# Patient Record
Sex: FEMALE | Race: WHITE | NOT HISPANIC OR LATINO | Employment: UNEMPLOYED | URBAN - METROPOLITAN AREA
[De-identification: names, ages, dates, MRNs, and addresses within clinical notes are randomized per-mention and may not be internally consistent; named-entity substitution may affect disease eponyms.]

---

## 2017-01-09 ENCOUNTER — HOSPITAL ENCOUNTER (OUTPATIENT)
Dept: PALLIATIVE MEDICINE | Facility: OTHER | Age: 52
Discharge: HOME | End: 2017-01-09
Attending: NURSE PRACTITIONER

## 2017-01-09 ENCOUNTER — COMMUNICATION - HEALTHEAST (OUTPATIENT)
Dept: PALLIATIVE MEDICINE | Facility: OTHER | Age: 52
End: 2017-01-09

## 2017-01-09 DIAGNOSIS — G43.701 CHRONIC MIGRAINE WITHOUT AURA WITH STATUS MIGRAINOSUS, NOT INTRACTABLE: ICD-10-CM

## 2017-01-09 DIAGNOSIS — G89.4 CHRONIC PAIN SYNDROME: ICD-10-CM

## 2017-01-09 ASSESSMENT — MIFFLIN-ST. JEOR: SCORE: 1583.79

## 2017-01-19 ENCOUNTER — COMMUNICATION - HEALTHEAST (OUTPATIENT)
Dept: PALLIATIVE MEDICINE | Facility: OTHER | Age: 52
End: 2017-01-19

## 2017-01-19 DIAGNOSIS — G43.701 CHRONIC MIGRAINE WITHOUT AURA WITH STATUS MIGRAINOSUS, NOT INTRACTABLE: ICD-10-CM

## 2017-02-20 ENCOUNTER — COMMUNICATION - HEALTHEAST (OUTPATIENT)
Dept: PALLIATIVE MEDICINE | Facility: OTHER | Age: 52
End: 2017-02-20

## 2017-02-20 DIAGNOSIS — G43.701 CHRONIC MIGRAINE WITHOUT AURA WITH STATUS MIGRAINOSUS, NOT INTRACTABLE: ICD-10-CM

## 2017-03-03 ENCOUNTER — COMMUNICATION - HEALTHEAST (OUTPATIENT)
Dept: PALLIATIVE MEDICINE | Facility: OTHER | Age: 52
End: 2017-03-03

## 2017-03-09 ENCOUNTER — HOSPITAL ENCOUNTER (OUTPATIENT)
Dept: PALLIATIVE MEDICINE | Facility: OTHER | Age: 52
Discharge: HOME | End: 2017-03-09
Attending: NURSE PRACTITIONER

## 2017-03-09 DIAGNOSIS — G43.701 CHRONIC MIGRAINE WITHOUT AURA WITH STATUS MIGRAINOSUS, NOT INTRACTABLE: ICD-10-CM

## 2017-03-09 DIAGNOSIS — M54.2 NECK PAIN: ICD-10-CM

## 2017-03-09 ASSESSMENT — MIFFLIN-ST. JEOR: SCORE: 1583.79

## 2017-04-17 ENCOUNTER — COMMUNICATION - HEALTHEAST (OUTPATIENT)
Dept: PALLIATIVE MEDICINE | Facility: CLINIC | Age: 52
End: 2017-04-17

## 2017-05-04 ENCOUNTER — HOSPITAL ENCOUNTER (OUTPATIENT)
Dept: PALLIATIVE MEDICINE | Facility: OTHER | Age: 52
Discharge: HOME | End: 2017-05-04
Attending: NURSE PRACTITIONER

## 2017-05-04 DIAGNOSIS — G43.701 CHRONIC MIGRAINE WITHOUT AURA WITH STATUS MIGRAINOSUS, NOT INTRACTABLE: ICD-10-CM

## 2017-05-04 DIAGNOSIS — G43.711 INTRACTABLE CHRONIC MIGRAINE WITHOUT AURA AND WITH STATUS MIGRAINOSUS: ICD-10-CM

## 2017-05-04 ASSESSMENT — MIFFLIN-ST. JEOR: SCORE: 1583.79

## 2017-05-16 ENCOUNTER — COMMUNICATION - HEALTHEAST (OUTPATIENT)
Dept: PALLIATIVE MEDICINE | Facility: CLINIC | Age: 52
End: 2017-05-16

## 2017-05-19 ENCOUNTER — AMBULATORY - HEALTHEAST (OUTPATIENT)
Dept: PALLIATIVE MEDICINE | Facility: OTHER | Age: 52
End: 2017-05-19

## 2017-06-10 ENCOUNTER — COMMUNICATION - HEALTHEAST (OUTPATIENT)
Dept: SCHEDULING | Facility: CLINIC | Age: 52
End: 2017-06-10

## 2017-06-28 ENCOUNTER — COMMUNICATION - HEALTHEAST (OUTPATIENT)
Dept: PALLIATIVE MEDICINE | Facility: OTHER | Age: 52
End: 2017-06-28

## 2017-06-28 DIAGNOSIS — G43.711 INTRACTABLE CHRONIC MIGRAINE WITHOUT AURA AND WITH STATUS MIGRAINOSUS: ICD-10-CM

## 2017-06-29 ENCOUNTER — HOSPITAL ENCOUNTER (OUTPATIENT)
Dept: PALLIATIVE MEDICINE | Facility: OTHER | Age: 52
Discharge: HOME | End: 2017-06-29
Attending: NURSE PRACTITIONER

## 2017-06-29 DIAGNOSIS — G43.701 CHRONIC MIGRAINE WITHOUT AURA WITH STATUS MIGRAINOSUS, NOT INTRACTABLE: ICD-10-CM

## 2017-06-29 DIAGNOSIS — G89.4 CHRONIC PAIN SYNDROME: ICD-10-CM

## 2017-06-29 DIAGNOSIS — G43.711 INTRACTABLE CHRONIC MIGRAINE WITHOUT AURA AND WITH STATUS MIGRAINOSUS: ICD-10-CM

## 2017-06-29 ASSESSMENT — MIFFLIN-ST. JEOR: SCORE: 1583.79

## 2017-07-06 ENCOUNTER — COMMUNICATION - HEALTHEAST (OUTPATIENT)
Dept: PALLIATIVE MEDICINE | Facility: CLINIC | Age: 52
End: 2017-07-06

## 2017-07-08 ENCOUNTER — COMMUNICATION - HEALTHEAST (OUTPATIENT)
Dept: SCHEDULING | Facility: CLINIC | Age: 52
End: 2017-07-08

## 2017-07-11 ENCOUNTER — AMBULATORY - HEALTHEAST (OUTPATIENT)
Dept: PALLIATIVE MEDICINE | Facility: OTHER | Age: 52
End: 2017-07-11

## 2017-08-23 ENCOUNTER — COMMUNICATION - HEALTHEAST (OUTPATIENT)
Dept: PALLIATIVE MEDICINE | Facility: OTHER | Age: 52
End: 2017-08-23

## 2017-08-23 DIAGNOSIS — G43.711 INTRACTABLE CHRONIC MIGRAINE WITHOUT AURA AND WITH STATUS MIGRAINOSUS: ICD-10-CM

## 2017-08-29 ENCOUNTER — HOSPITAL ENCOUNTER (OUTPATIENT)
Dept: PALLIATIVE MEDICINE | Facility: OTHER | Age: 52
Discharge: HOME | End: 2017-08-29
Attending: NURSE PRACTITIONER

## 2017-08-29 DIAGNOSIS — G43.711 INTRACTABLE CHRONIC MIGRAINE WITHOUT AURA AND WITH STATUS MIGRAINOSUS: ICD-10-CM

## 2017-08-29 DIAGNOSIS — G43.701 CHRONIC MIGRAINE WITHOUT AURA WITH STATUS MIGRAINOSUS, NOT INTRACTABLE: ICD-10-CM

## 2017-08-29 ASSESSMENT — MIFFLIN-ST. JEOR: SCORE: 1583.79

## 2017-10-22 ENCOUNTER — COMMUNICATION - HEALTHEAST (OUTPATIENT)
Dept: PALLIATIVE MEDICINE | Facility: OTHER | Age: 52
End: 2017-10-22

## 2017-10-22 DIAGNOSIS — G43.711 INTRACTABLE CHRONIC MIGRAINE WITHOUT AURA AND WITH STATUS MIGRAINOSUS: ICD-10-CM

## 2017-10-24 ENCOUNTER — HOSPITAL ENCOUNTER (OUTPATIENT)
Dept: PALLIATIVE MEDICINE | Facility: OTHER | Age: 52
Discharge: HOME | End: 2017-10-24
Attending: NURSE PRACTITIONER

## 2017-10-24 DIAGNOSIS — G43.701 CHRONIC MIGRAINE WITHOUT AURA WITH STATUS MIGRAINOSUS, NOT INTRACTABLE: ICD-10-CM

## 2017-10-24 DIAGNOSIS — G43.711 INTRACTABLE CHRONIC MIGRAINE WITHOUT AURA AND WITH STATUS MIGRAINOSUS: ICD-10-CM

## 2017-10-24 ASSESSMENT — MIFFLIN-ST. JEOR: SCORE: 1583.79

## 2017-12-19 ENCOUNTER — COMMUNICATION - HEALTHEAST (OUTPATIENT)
Dept: PALLIATIVE MEDICINE | Facility: OTHER | Age: 52
End: 2017-12-19

## 2017-12-19 ENCOUNTER — HOSPITAL ENCOUNTER (OUTPATIENT)
Dept: PALLIATIVE MEDICINE | Facility: OTHER | Age: 52
Discharge: HOME | End: 2017-12-19
Attending: NURSE PRACTITIONER

## 2017-12-19 DIAGNOSIS — G43.701 CHRONIC MIGRAINE WITHOUT AURA WITH STATUS MIGRAINOSUS, NOT INTRACTABLE: ICD-10-CM

## 2017-12-19 DIAGNOSIS — G43.711 INTRACTABLE CHRONIC MIGRAINE WITHOUT AURA AND WITH STATUS MIGRAINOSUS: ICD-10-CM

## 2017-12-19 ASSESSMENT — MIFFLIN-ST. JEOR: SCORE: 1583.79

## 2018-01-09 ENCOUNTER — COMMUNICATION - HEALTHEAST (OUTPATIENT)
Dept: PALLIATIVE MEDICINE | Facility: OTHER | Age: 53
End: 2018-01-09

## 2018-01-09 DIAGNOSIS — G43.711 INTRACTABLE CHRONIC MIGRAINE WITHOUT AURA AND WITH STATUS MIGRAINOSUS: ICD-10-CM

## 2018-01-17 ENCOUNTER — COMMUNICATION - HEALTHEAST (OUTPATIENT)
Dept: PALLIATIVE MEDICINE | Facility: OTHER | Age: 53
End: 2018-01-17

## 2018-01-17 DIAGNOSIS — G43.711 INTRACTABLE CHRONIC MIGRAINE WITHOUT AURA AND WITH STATUS MIGRAINOSUS: ICD-10-CM

## 2018-01-22 ENCOUNTER — COMMUNICATION - HEALTHEAST (OUTPATIENT)
Dept: PALLIATIVE MEDICINE | Facility: OTHER | Age: 53
End: 2018-01-22

## 2018-01-22 DIAGNOSIS — G43.701 CHRONIC MIGRAINE WITHOUT AURA WITH STATUS MIGRAINOSUS, NOT INTRACTABLE: ICD-10-CM

## 2018-01-23 ENCOUNTER — COMMUNICATION - HEALTHEAST (OUTPATIENT)
Dept: PALLIATIVE MEDICINE | Facility: OTHER | Age: 53
End: 2018-01-23

## 2018-01-23 DIAGNOSIS — G43.701 CHRONIC MIGRAINE WITHOUT AURA WITH STATUS MIGRAINOSUS, NOT INTRACTABLE: ICD-10-CM

## 2018-02-12 ENCOUNTER — HOSPITAL ENCOUNTER (OUTPATIENT)
Dept: PALLIATIVE MEDICINE | Facility: OTHER | Age: 53
Discharge: HOME | End: 2018-02-12
Attending: NURSE PRACTITIONER

## 2018-02-12 DIAGNOSIS — G43.701 CHRONIC MIGRAINE WITHOUT AURA WITH STATUS MIGRAINOSUS, NOT INTRACTABLE: ICD-10-CM

## 2018-02-12 ASSESSMENT — MIFFLIN-ST. JEOR: SCORE: 1583.79

## 2018-04-06 ENCOUNTER — COMMUNICATION - HEALTHEAST (OUTPATIENT)
Dept: PALLIATIVE MEDICINE | Facility: OTHER | Age: 53
End: 2018-04-06

## 2018-04-06 DIAGNOSIS — G43.711 INTRACTABLE CHRONIC MIGRAINE WITHOUT AURA AND WITH STATUS MIGRAINOSUS: ICD-10-CM

## 2018-04-06 DIAGNOSIS — G43.701 CHRONIC MIGRAINE WITHOUT AURA WITH STATUS MIGRAINOSUS, NOT INTRACTABLE: ICD-10-CM

## 2018-04-09 ENCOUNTER — COMMUNICATION - HEALTHEAST (OUTPATIENT)
Dept: PALLIATIVE MEDICINE | Facility: OTHER | Age: 53
End: 2018-04-09

## 2018-04-09 ENCOUNTER — HOSPITAL ENCOUNTER (OUTPATIENT)
Dept: PALLIATIVE MEDICINE | Facility: OTHER | Age: 53
Discharge: HOME | End: 2018-04-09
Attending: NURSE PRACTITIONER

## 2018-04-09 DIAGNOSIS — G43.711 INTRACTABLE CHRONIC MIGRAINE WITHOUT AURA AND WITH STATUS MIGRAINOSUS: ICD-10-CM

## 2018-04-09 DIAGNOSIS — G43.701 CHRONIC MIGRAINE WITHOUT AURA WITH STATUS MIGRAINOSUS, NOT INTRACTABLE: ICD-10-CM

## 2018-04-09 ASSESSMENT — MIFFLIN-ST. JEOR: SCORE: 1629.15

## 2018-05-25 ENCOUNTER — COMMUNICATION - HEALTHEAST (OUTPATIENT)
Dept: PALLIATIVE MEDICINE | Facility: OTHER | Age: 53
End: 2018-05-25

## 2018-05-25 DIAGNOSIS — G43.701 CHRONIC MIGRAINE WITHOUT AURA WITH STATUS MIGRAINOSUS, NOT INTRACTABLE: ICD-10-CM

## 2018-06-05 ENCOUNTER — HOSPITAL ENCOUNTER (OUTPATIENT)
Dept: PALLIATIVE MEDICINE | Facility: OTHER | Age: 53
Discharge: HOME | End: 2018-06-05
Attending: NURSE PRACTITIONER

## 2018-06-05 DIAGNOSIS — G43.701 CHRONIC MIGRAINE WITHOUT AURA WITH STATUS MIGRAINOSUS, NOT INTRACTABLE: ICD-10-CM

## 2018-06-05 DIAGNOSIS — K21.9 GASTROESOPHAGEAL REFLUX DISEASE WITHOUT ESOPHAGITIS: ICD-10-CM

## 2018-06-05 DIAGNOSIS — G43.711 INTRACTABLE CHRONIC MIGRAINE WITHOUT AURA AND WITH STATUS MIGRAINOSUS: ICD-10-CM

## 2018-06-05 ASSESSMENT — MIFFLIN-ST. JEOR: SCORE: 1583.79

## 2018-07-17 ENCOUNTER — COMMUNICATION - HEALTHEAST (OUTPATIENT)
Dept: PALLIATIVE MEDICINE | Facility: OTHER | Age: 53
End: 2018-07-17

## 2018-07-17 DIAGNOSIS — K21.9 GASTROESOPHAGEAL REFLUX DISEASE WITHOUT ESOPHAGITIS: ICD-10-CM

## 2018-07-26 ENCOUNTER — COMMUNICATION - HEALTHEAST (OUTPATIENT)
Dept: PALLIATIVE MEDICINE | Facility: OTHER | Age: 53
End: 2018-07-26

## 2018-07-26 DIAGNOSIS — G43.711 INTRACTABLE CHRONIC MIGRAINE WITHOUT AURA AND WITH STATUS MIGRAINOSUS: ICD-10-CM

## 2018-07-31 ENCOUNTER — HOSPITAL ENCOUNTER (OUTPATIENT)
Dept: PALLIATIVE MEDICINE | Facility: OTHER | Age: 53
Discharge: HOME | End: 2018-07-31
Attending: NURSE PRACTITIONER

## 2018-07-31 DIAGNOSIS — G43.701 CHRONIC MIGRAINE WITHOUT AURA WITH STATUS MIGRAINOSUS, NOT INTRACTABLE: ICD-10-CM

## 2018-07-31 DIAGNOSIS — G43.711 INTRACTABLE CHRONIC MIGRAINE WITHOUT AURA AND WITH STATUS MIGRAINOSUS: ICD-10-CM

## 2018-07-31 ASSESSMENT — MIFFLIN-ST. JEOR: SCORE: 1606.47

## 2018-08-03 ENCOUNTER — COMMUNICATION - HEALTHEAST (OUTPATIENT)
Dept: PALLIATIVE MEDICINE | Facility: OTHER | Age: 53
End: 2018-08-03

## 2018-08-07 ENCOUNTER — COMMUNICATION - HEALTHEAST (OUTPATIENT)
Dept: PALLIATIVE MEDICINE | Facility: CLINIC | Age: 53
End: 2018-08-07

## 2018-08-09 ENCOUNTER — COMMUNICATION - HEALTHEAST (OUTPATIENT)
Dept: PALLIATIVE MEDICINE | Facility: OTHER | Age: 53
End: 2018-08-09

## 2018-08-14 ENCOUNTER — COMMUNICATION - HEALTHEAST (OUTPATIENT)
Dept: PALLIATIVE MEDICINE | Facility: OTHER | Age: 53
End: 2018-08-14

## 2018-08-14 DIAGNOSIS — G43.701 CHRONIC MIGRAINE WITHOUT AURA WITH STATUS MIGRAINOSUS, NOT INTRACTABLE: ICD-10-CM

## 2018-08-15 ENCOUNTER — COMMUNICATION - HEALTHEAST (OUTPATIENT)
Dept: PALLIATIVE MEDICINE | Facility: OTHER | Age: 53
End: 2018-08-15

## 2018-08-15 DIAGNOSIS — G43.701 CHRONIC MIGRAINE WITHOUT AURA WITH STATUS MIGRAINOSUS, NOT INTRACTABLE: ICD-10-CM

## 2018-08-22 ENCOUNTER — HOSPITAL ENCOUNTER (OUTPATIENT)
Dept: PALLIATIVE MEDICINE | Facility: OTHER | Age: 53
Discharge: HOME | End: 2018-08-22
Attending: NURSE PRACTITIONER

## 2018-08-22 DIAGNOSIS — G43.711 INTRACTABLE CHRONIC MIGRAINE WITHOUT AURA AND WITH STATUS MIGRAINOSUS: ICD-10-CM

## 2018-08-22 DIAGNOSIS — G89.4 CHRONIC PAIN SYNDROME: ICD-10-CM

## 2018-08-22 ASSESSMENT — MIFFLIN-ST. JEOR: SCORE: 1606.47

## 2018-09-04 ENCOUNTER — HOSPITAL ENCOUNTER (OUTPATIENT)
Dept: RADIOLOGY | Facility: CLINIC | Age: 53
Discharge: HOME | End: 2018-09-04

## 2018-09-04 DIAGNOSIS — G89.4 CHRONIC PAIN SYNDROME: ICD-10-CM

## 2018-09-04 DIAGNOSIS — G43.711 INTRACTABLE CHRONIC MIGRAINE WITHOUT AURA AND WITH STATUS MIGRAINOSUS: ICD-10-CM

## 2018-09-25 ENCOUNTER — HOSPITAL ENCOUNTER (OUTPATIENT)
Dept: PALLIATIVE MEDICINE | Facility: OTHER | Age: 53
Discharge: HOME | End: 2018-09-25
Attending: NURSE PRACTITIONER

## 2018-09-25 DIAGNOSIS — G43.711 INTRACTABLE CHRONIC MIGRAINE WITHOUT AURA AND WITH STATUS MIGRAINOSUS: ICD-10-CM

## 2018-09-25 DIAGNOSIS — G43.701 CHRONIC MIGRAINE WITHOUT AURA WITH STATUS MIGRAINOSUS, NOT INTRACTABLE: ICD-10-CM

## 2018-09-25 ASSESSMENT — MIFFLIN-ST. JEOR: SCORE: 1583.79

## 2018-11-21 ENCOUNTER — HOSPITAL ENCOUNTER (OUTPATIENT)
Dept: PALLIATIVE MEDICINE | Facility: OTHER | Age: 53
Discharge: HOME | End: 2018-11-21
Attending: NURSE PRACTITIONER

## 2018-11-21 DIAGNOSIS — G89.4 CHRONIC PAIN SYNDROME: ICD-10-CM

## 2018-11-21 ASSESSMENT — MIFFLIN-ST. JEOR: SCORE: 1583.79

## 2018-11-23 ENCOUNTER — HOSPITAL ENCOUNTER (OUTPATIENT)
Dept: PALLIATIVE MEDICINE | Facility: OTHER | Age: 53
Discharge: HOME | End: 2018-11-23
Attending: NURSE PRACTITIONER

## 2018-11-23 DIAGNOSIS — G43.701 CHRONIC MIGRAINE WITHOUT AURA WITH STATUS MIGRAINOSUS, NOT INTRACTABLE: ICD-10-CM

## 2018-11-23 ASSESSMENT — MIFFLIN-ST. JEOR: SCORE: 1583.79

## 2018-12-18 ENCOUNTER — COMMUNICATION - HEALTHEAST (OUTPATIENT)
Dept: PALLIATIVE MEDICINE | Facility: OTHER | Age: 53
End: 2018-12-18

## 2019-01-02 ENCOUNTER — COMMUNICATION - HEALTHEAST (OUTPATIENT)
Dept: PALLIATIVE MEDICINE | Facility: OTHER | Age: 54
End: 2019-01-02

## 2019-01-02 DIAGNOSIS — G43.711 INTRACTABLE CHRONIC MIGRAINE WITHOUT AURA AND WITH STATUS MIGRAINOSUS: ICD-10-CM

## 2019-01-17 ENCOUNTER — COMMUNICATION - HEALTHEAST (OUTPATIENT)
Dept: PALLIATIVE MEDICINE | Facility: OTHER | Age: 54
End: 2019-01-17

## 2019-01-17 DIAGNOSIS — G43.711 INTRACTABLE CHRONIC MIGRAINE WITHOUT AURA AND WITH STATUS MIGRAINOSUS: ICD-10-CM

## 2019-01-22 ENCOUNTER — HOSPITAL ENCOUNTER (OUTPATIENT)
Dept: PALLIATIVE MEDICINE | Facility: OTHER | Age: 54
Discharge: HOME | End: 2019-01-22
Attending: NURSE PRACTITIONER

## 2019-01-22 DIAGNOSIS — G43.711 INTRACTABLE CHRONIC MIGRAINE WITHOUT AURA AND WITH STATUS MIGRAINOSUS: ICD-10-CM

## 2019-01-22 DIAGNOSIS — G43.701 CHRONIC MIGRAINE WITHOUT AURA WITH STATUS MIGRAINOSUS, NOT INTRACTABLE: ICD-10-CM

## 2019-01-22 DIAGNOSIS — G89.4 CHRONIC PAIN SYNDROME: ICD-10-CM

## 2019-01-22 ASSESSMENT — MIFFLIN-ST. JEOR: SCORE: 1583.79

## 2019-01-25 LAB

## 2019-02-23 ENCOUNTER — COMMUNICATION - HEALTHEAST (OUTPATIENT)
Dept: PALLIATIVE MEDICINE | Facility: OTHER | Age: 54
End: 2019-02-23

## 2019-02-23 DIAGNOSIS — G43.711 INTRACTABLE CHRONIC MIGRAINE WITHOUT AURA AND WITH STATUS MIGRAINOSUS: ICD-10-CM

## 2019-03-22 ENCOUNTER — HOSPITAL ENCOUNTER (OUTPATIENT)
Dept: PALLIATIVE MEDICINE | Facility: OTHER | Age: 54
Discharge: HOME | End: 2019-03-22
Attending: NURSE PRACTITIONER

## 2019-03-22 DIAGNOSIS — G43.701 CHRONIC MIGRAINE WITHOUT AURA WITH STATUS MIGRAINOSUS, NOT INTRACTABLE: ICD-10-CM

## 2019-03-22 DIAGNOSIS — G43.711 INTRACTABLE CHRONIC MIGRAINE WITHOUT AURA AND WITH STATUS MIGRAINOSUS: ICD-10-CM

## 2019-03-22 ASSESSMENT — MIFFLIN-ST. JEOR: SCORE: 1538.43

## 2019-05-23 ENCOUNTER — COMMUNICATION - HEALTHEAST (OUTPATIENT)
Dept: PALLIATIVE MEDICINE | Facility: OTHER | Age: 54
End: 2019-05-23

## 2019-05-23 ENCOUNTER — HOSPITAL ENCOUNTER (OUTPATIENT)
Dept: PALLIATIVE MEDICINE | Facility: OTHER | Age: 54
Discharge: HOME | End: 2019-05-23
Attending: NURSE PRACTITIONER

## 2019-05-23 DIAGNOSIS — G43.701 CHRONIC MIGRAINE WITHOUT AURA WITH STATUS MIGRAINOSUS, NOT INTRACTABLE: ICD-10-CM

## 2019-05-23 DIAGNOSIS — G43.711 INTRACTABLE CHRONIC MIGRAINE WITHOUT AURA AND WITH STATUS MIGRAINOSUS: ICD-10-CM

## 2019-05-23 ASSESSMENT — MIFFLIN-ST. JEOR: SCORE: 1538.43

## 2019-06-06 ENCOUNTER — HOSPITAL ENCOUNTER (OUTPATIENT)
Dept: PALLIATIVE MEDICINE | Facility: OTHER | Age: 54
Discharge: HOME | End: 2019-06-06
Attending: NURSE PRACTITIONER

## 2019-06-06 DIAGNOSIS — M54.9 INTRACTABLE BACK PAIN: ICD-10-CM

## 2019-06-06 DIAGNOSIS — G89.29 CHRONIC NONINTRACTABLE HEADACHE, UNSPECIFIED HEADACHE TYPE: ICD-10-CM

## 2019-06-06 DIAGNOSIS — G89.4 CHRONIC PAIN SYNDROME: ICD-10-CM

## 2019-06-06 DIAGNOSIS — R51.9 CHRONIC NONINTRACTABLE HEADACHE, UNSPECIFIED HEADACHE TYPE: ICD-10-CM

## 2019-06-06 ASSESSMENT — MIFFLIN-ST. JEOR: SCORE: 1538.43

## 2019-07-12 ENCOUNTER — COMMUNICATION - HEALTHEAST (OUTPATIENT)
Dept: PALLIATIVE MEDICINE | Facility: OTHER | Age: 54
End: 2019-07-12

## 2019-07-12 DIAGNOSIS — G43.711 INTRACTABLE CHRONIC MIGRAINE WITHOUT AURA AND WITH STATUS MIGRAINOSUS: ICD-10-CM

## 2019-07-16 ENCOUNTER — HOSPITAL ENCOUNTER (OUTPATIENT)
Dept: PALLIATIVE MEDICINE | Facility: OTHER | Age: 54
Discharge: HOME | End: 2019-07-16
Attending: NURSE PRACTITIONER

## 2019-07-16 DIAGNOSIS — G43.711 INTRACTABLE CHRONIC MIGRAINE WITHOUT AURA AND WITH STATUS MIGRAINOSUS: ICD-10-CM

## 2019-07-16 DIAGNOSIS — G43.701 CHRONIC MIGRAINE WITHOUT AURA WITH STATUS MIGRAINOSUS, NOT INTRACTABLE: ICD-10-CM

## 2019-07-16 ASSESSMENT — MIFFLIN-ST. JEOR: SCORE: 1515.75

## 2019-09-10 ENCOUNTER — HOSPITAL ENCOUNTER (OUTPATIENT)
Dept: PALLIATIVE MEDICINE | Facility: OTHER | Age: 54
Discharge: HOME | End: 2019-09-10
Attending: NURSE PRACTITIONER

## 2019-09-10 DIAGNOSIS — R11.0 NAUSEA: ICD-10-CM

## 2019-09-10 DIAGNOSIS — G43.719 INTRACTABLE CHRONIC MIGRAINE WITHOUT AURA AND WITHOUT STATUS MIGRAINOSUS: ICD-10-CM

## 2019-09-10 DIAGNOSIS — G89.4 CHRONIC PAIN SYNDROME: ICD-10-CM

## 2019-09-10 DIAGNOSIS — G43.701 CHRONIC MIGRAINE WITHOUT AURA WITH STATUS MIGRAINOSUS, NOT INTRACTABLE: ICD-10-CM

## 2019-09-10 ASSESSMENT — MIFFLIN-ST. JEOR: SCORE: 1515.75

## 2019-10-19 ENCOUNTER — COMMUNICATION - HEALTHEAST (OUTPATIENT)
Dept: PALLIATIVE MEDICINE | Facility: OTHER | Age: 54
End: 2019-10-19

## 2019-10-19 DIAGNOSIS — G43.711 INTRACTABLE CHRONIC MIGRAINE WITHOUT AURA AND WITH STATUS MIGRAINOSUS: ICD-10-CM

## 2019-10-20 ENCOUNTER — COMMUNICATION - HEALTHEAST (OUTPATIENT)
Dept: PALLIATIVE MEDICINE | Facility: OTHER | Age: 54
End: 2019-10-20

## 2019-10-20 DIAGNOSIS — G43.711 INTRACTABLE CHRONIC MIGRAINE WITHOUT AURA AND WITH STATUS MIGRAINOSUS: ICD-10-CM

## 2019-11-05 ENCOUNTER — HOSPITAL ENCOUNTER (OUTPATIENT)
Dept: PALLIATIVE MEDICINE | Facility: OTHER | Age: 54
Discharge: HOME | End: 2019-11-05
Attending: NURSE PRACTITIONER

## 2019-11-05 DIAGNOSIS — G43.701 CHRONIC MIGRAINE WITHOUT AURA WITH STATUS MIGRAINOSUS, NOT INTRACTABLE: ICD-10-CM

## 2019-11-05 DIAGNOSIS — G43.711 INTRACTABLE CHRONIC MIGRAINE WITHOUT AURA AND WITH STATUS MIGRAINOSUS: ICD-10-CM

## 2019-11-05 DIAGNOSIS — G43.719 INTRACTABLE CHRONIC MIGRAINE WITHOUT AURA AND WITHOUT STATUS MIGRAINOSUS: ICD-10-CM

## 2019-11-05 DIAGNOSIS — R11.0 NAUSEA: ICD-10-CM

## 2019-11-05 RX ORDER — ATORVASTATIN CALCIUM 20 MG/1
20 TABLET, FILM COATED ORAL
Status: SHIPPED | COMMUNITY
Start: 2019-10-30

## 2019-11-05 RX ORDER — KETOROLAC TROMETHAMINE 10 MG/1
10 TABLET, FILM COATED ORAL 2 TIMES DAILY PRN
Qty: 10 TABLET | Refills: 2 | Status: SHIPPED | OUTPATIENT
Start: 2019-11-05 | End: 2021-09-14

## 2019-11-05 ASSESSMENT — MIFFLIN-ST. JEOR: SCORE: 1515.75

## 2019-12-05 ENCOUNTER — HOSPITAL ENCOUNTER (OUTPATIENT)
Dept: PALLIATIVE MEDICINE | Facility: OTHER | Age: 54
Discharge: HOME | End: 2019-12-05
Attending: NURSE PRACTITIONER

## 2019-12-05 DIAGNOSIS — G89.4 CHRONIC PAIN SYNDROME: ICD-10-CM

## 2019-12-05 ASSESSMENT — MIFFLIN-ST. JEOR: SCORE: 1515.75

## 2019-12-31 ENCOUNTER — HOSPITAL ENCOUNTER (OUTPATIENT)
Dept: PALLIATIVE MEDICINE | Facility: OTHER | Age: 54
Discharge: HOME | End: 2019-12-31
Attending: NURSE PRACTITIONER

## 2019-12-31 DIAGNOSIS — G43.711 INTRACTABLE CHRONIC MIGRAINE WITHOUT AURA AND WITH STATUS MIGRAINOSUS: ICD-10-CM

## 2019-12-31 DIAGNOSIS — R11.0 NAUSEA: ICD-10-CM

## 2019-12-31 DIAGNOSIS — G43.719 INTRACTABLE CHRONIC MIGRAINE WITHOUT AURA AND WITHOUT STATUS MIGRAINOSUS: ICD-10-CM

## 2019-12-31 DIAGNOSIS — G43.701 CHRONIC MIGRAINE WITHOUT AURA WITH STATUS MIGRAINOSUS, NOT INTRACTABLE: ICD-10-CM

## 2019-12-31 ASSESSMENT — MIFFLIN-ST. JEOR: SCORE: 1515.75

## 2020-02-21 ENCOUNTER — HOSPITAL ENCOUNTER (OUTPATIENT)
Dept: PALLIATIVE MEDICINE | Facility: OTHER | Age: 55
Discharge: HOME | End: 2020-02-21
Attending: NURSE PRACTITIONER

## 2020-02-21 DIAGNOSIS — G43.719 INTRACTABLE CHRONIC MIGRAINE WITHOUT AURA AND WITHOUT STATUS MIGRAINOSUS: ICD-10-CM

## 2020-02-21 DIAGNOSIS — G89.4 CHRONIC PAIN SYNDROME: ICD-10-CM

## 2020-02-21 DIAGNOSIS — R11.0 NAUSEA: ICD-10-CM

## 2020-02-21 DIAGNOSIS — G43.711 INTRACTABLE CHRONIC MIGRAINE WITHOUT AURA AND WITH STATUS MIGRAINOSUS: ICD-10-CM

## 2020-02-21 DIAGNOSIS — G43.701 CHRONIC MIGRAINE WITHOUT AURA WITH STATUS MIGRAINOSUS, NOT INTRACTABLE: ICD-10-CM

## 2020-02-21 ASSESSMENT — MIFFLIN-ST. JEOR: SCORE: 1547.51

## 2020-02-24 LAB

## 2020-03-16 ENCOUNTER — COMMUNICATION - HEALTHEAST (OUTPATIENT)
Dept: PALLIATIVE MEDICINE | Facility: OTHER | Age: 55
End: 2020-03-16

## 2020-03-16 DIAGNOSIS — G43.711 INTRACTABLE CHRONIC MIGRAINE WITHOUT AURA AND WITH STATUS MIGRAINOSUS: ICD-10-CM

## 2020-04-14 RX ORDER — HYDROCHLOROTHIAZIDE 12.5 MG/1
12.5 TABLET ORAL DAILY
Status: SHIPPED | COMMUNITY
Start: 2020-02-25

## 2020-04-14 RX ORDER — PANTOPRAZOLE SODIUM 20 MG/1
20 TABLET, DELAYED RELEASE ORAL DAILY
Status: SHIPPED | COMMUNITY
Start: 2020-03-16

## 2020-04-14 RX ORDER — LOSARTAN POTASSIUM 50 MG/1
100 TABLET ORAL DAILY
Status: SHIPPED | COMMUNITY
Start: 2020-02-26

## 2020-04-15 ENCOUNTER — HOSPITAL ENCOUNTER (OUTPATIENT)
Dept: PALLIATIVE MEDICINE | Facility: OTHER | Age: 55
Discharge: HOME | End: 2020-04-15
Attending: NURSE PRACTITIONER

## 2020-04-15 DIAGNOSIS — G43.711 INTRACTABLE CHRONIC MIGRAINE WITHOUT AURA AND WITH STATUS MIGRAINOSUS: ICD-10-CM

## 2020-04-15 DIAGNOSIS — R11.0 NAUSEA: ICD-10-CM

## 2020-04-15 DIAGNOSIS — G43.719 INTRACTABLE CHRONIC MIGRAINE WITHOUT AURA AND WITHOUT STATUS MIGRAINOSUS: ICD-10-CM

## 2020-04-15 DIAGNOSIS — G43.701 CHRONIC MIGRAINE WITHOUT AURA WITH STATUS MIGRAINOSUS, NOT INTRACTABLE: ICD-10-CM

## 2020-06-11 ENCOUNTER — HOSPITAL ENCOUNTER (OUTPATIENT)
Dept: PALLIATIVE MEDICINE | Facility: OTHER | Age: 55
Discharge: HOME | End: 2020-06-11
Attending: NURSE PRACTITIONER

## 2020-06-11 DIAGNOSIS — G43.701 CHRONIC MIGRAINE WITHOUT AURA WITH STATUS MIGRAINOSUS, NOT INTRACTABLE: ICD-10-CM

## 2020-08-04 ENCOUNTER — COMMUNICATION - HEALTHEAST (OUTPATIENT)
Dept: PALLIATIVE MEDICINE | Facility: OTHER | Age: 55
End: 2020-08-04

## 2020-08-04 DIAGNOSIS — G43.701 CHRONIC MIGRAINE WITHOUT AURA WITH STATUS MIGRAINOSUS, NOT INTRACTABLE: ICD-10-CM

## 2020-08-04 DIAGNOSIS — G43.711 INTRACTABLE CHRONIC MIGRAINE WITHOUT AURA AND WITH STATUS MIGRAINOSUS: ICD-10-CM

## 2020-08-11 ENCOUNTER — COMMUNICATION - HEALTHEAST (OUTPATIENT)
Dept: PALLIATIVE MEDICINE | Facility: OTHER | Age: 55
End: 2020-08-11

## 2020-08-11 DIAGNOSIS — G43.711 INTRACTABLE CHRONIC MIGRAINE WITHOUT AURA AND WITH STATUS MIGRAINOSUS: ICD-10-CM

## 2020-08-28 ENCOUNTER — HOSPITAL ENCOUNTER (OUTPATIENT)
Dept: PALLIATIVE MEDICINE | Facility: OTHER | Age: 55
Discharge: HOME | End: 2020-08-28
Attending: NURSE PRACTITIONER

## 2020-08-28 DIAGNOSIS — G43.719 INTRACTABLE CHRONIC MIGRAINE WITHOUT AURA AND WITHOUT STATUS MIGRAINOSUS: ICD-10-CM

## 2020-08-28 DIAGNOSIS — R11.0 NAUSEA: ICD-10-CM

## 2020-08-28 DIAGNOSIS — G43.701 CHRONIC MIGRAINE WITHOUT AURA WITH STATUS MIGRAINOSUS, NOT INTRACTABLE: ICD-10-CM

## 2020-08-28 DIAGNOSIS — G43.711 INTRACTABLE CHRONIC MIGRAINE WITHOUT AURA AND WITH STATUS MIGRAINOSUS: ICD-10-CM

## 2020-11-19 ENCOUNTER — HOSPITAL ENCOUNTER (OUTPATIENT)
Dept: PALLIATIVE MEDICINE | Facility: OTHER | Age: 55
Discharge: HOME | End: 2020-11-19
Attending: NURSE PRACTITIONER

## 2020-11-19 DIAGNOSIS — G43.711 INTRACTABLE CHRONIC MIGRAINE WITHOUT AURA AND WITH STATUS MIGRAINOSUS: ICD-10-CM

## 2020-11-19 DIAGNOSIS — K21.9 GASTROESOPHAGEAL REFLUX DISEASE WITHOUT ESOPHAGITIS: ICD-10-CM

## 2020-11-19 DIAGNOSIS — G43.701 CHRONIC MIGRAINE WITHOUT AURA WITH STATUS MIGRAINOSUS, NOT INTRACTABLE: ICD-10-CM

## 2020-11-19 DIAGNOSIS — R11.0 NAUSEA: ICD-10-CM

## 2020-11-19 DIAGNOSIS — G43.719 INTRACTABLE CHRONIC MIGRAINE WITHOUT AURA AND WITHOUT STATUS MIGRAINOSUS: ICD-10-CM

## 2021-01-07 ENCOUNTER — COMMUNICATION - HEALTHEAST (OUTPATIENT)
Dept: PALLIATIVE MEDICINE | Facility: OTHER | Age: 56
End: 2021-01-07

## 2021-01-20 ENCOUNTER — COMMUNICATION - HEALTHEAST (OUTPATIENT)
Dept: PALLIATIVE MEDICINE | Facility: OTHER | Age: 56
End: 2021-01-20

## 2021-01-20 DIAGNOSIS — G43.701 CHRONIC MIGRAINE WITHOUT AURA WITH STATUS MIGRAINOSUS, NOT INTRACTABLE: ICD-10-CM

## 2021-01-26 ENCOUNTER — HOSPITAL ENCOUNTER (OUTPATIENT)
Dept: PALLIATIVE MEDICINE | Facility: OTHER | Age: 56
Discharge: HOME | End: 2021-01-26
Attending: NURSE PRACTITIONER

## 2021-01-26 DIAGNOSIS — R11.0 NAUSEA: ICD-10-CM

## 2021-01-26 DIAGNOSIS — G89.4 CHRONIC PAIN SYNDROME: ICD-10-CM

## 2021-01-26 DIAGNOSIS — G43.701 CHRONIC MIGRAINE WITHOUT AURA WITH STATUS MIGRAINOSUS, NOT INTRACTABLE: ICD-10-CM

## 2021-01-26 DIAGNOSIS — G43.719 INTRACTABLE CHRONIC MIGRAINE WITHOUT AURA AND WITHOUT STATUS MIGRAINOSUS: ICD-10-CM

## 2021-01-26 DIAGNOSIS — G43.711 INTRACTABLE CHRONIC MIGRAINE WITHOUT AURA AND WITH STATUS MIGRAINOSUS: ICD-10-CM

## 2021-01-26 ASSESSMENT — MIFFLIN-ST. JEOR: SCORE: 1515.75

## 2021-01-30 LAB

## 2021-03-17 ENCOUNTER — HOSPITAL ENCOUNTER (OUTPATIENT)
Dept: PALLIATIVE MEDICINE | Facility: OTHER | Age: 56
Discharge: HOME | End: 2021-03-17
Attending: NURSE PRACTITIONER

## 2021-03-17 DIAGNOSIS — R11.0 NAUSEA: ICD-10-CM

## 2021-03-17 DIAGNOSIS — G43.711 INTRACTABLE CHRONIC MIGRAINE WITHOUT AURA AND WITH STATUS MIGRAINOSUS: ICD-10-CM

## 2021-03-17 DIAGNOSIS — G43.719 INTRACTABLE CHRONIC MIGRAINE WITHOUT AURA AND WITHOUT STATUS MIGRAINOSUS: ICD-10-CM

## 2021-03-17 DIAGNOSIS — G89.4 CHRONIC PAIN SYNDROME: ICD-10-CM

## 2021-03-17 RX ORDER — VERAPAMIL HYDROCHLORIDE 80 MG/1
80 TABLET ORAL 2 TIMES DAILY
Qty: 180 TABLET | Refills: 0 | Status: SHIPPED | OUTPATIENT
Start: 2021-05-10 | End: 2021-09-14

## 2021-03-17 RX ORDER — PROCHLORPERAZINE MALEATE 10 MG
10 TABLET ORAL EVERY 6 HOURS PRN
Qty: 30 TABLET | Refills: 0 | Status: SHIPPED | OUTPATIENT
Start: 2021-04-26 | End: 2021-09-14

## 2021-03-17 RX ORDER — TIZANIDINE HYDROCHLORIDE 4 MG/1
12 CAPSULE, GELATIN COATED ORAL
Qty: 270 CAPSULE | Refills: 0 | Status: SHIPPED | OUTPATIENT
Start: 2021-05-03 | End: 2021-08-31

## 2021-05-26 ENCOUNTER — COMMUNICATION - HEALTHEAST (OUTPATIENT)
Dept: PALLIATIVE MEDICINE | Facility: OTHER | Age: 56
End: 2021-05-26

## 2021-05-26 DIAGNOSIS — G43.701 CHRONIC MIGRAINE WITHOUT AURA WITH STATUS MIGRAINOSUS, NOT INTRACTABLE: ICD-10-CM

## 2021-05-26 RX ORDER — HYDROCODONE BITARTRATE AND ACETAMINOPHEN 10; 325 MG/1; MG/1
1 TABLET ORAL EVERY 4 HOURS PRN
Qty: 40 TABLET | Refills: 0 | Status: SHIPPED | OUTPATIENT
Start: 2021-05-26 | End: 2021-07-08

## 2021-05-26 NOTE — PROGRESS NOTES
Subjective:   Josy Valdez is a 54 y.o. female who presents for evaluation of pain. Reviewed the rooming evaluation. Patient was last seen 1/22/19 reviewed record.     CC: Pain. Review of current status.     Major issues:  1. Intractable chronic migraine without aura and with status migrainosus    2. Chronic migraine without aura with status migrainosus, not intractable      Patient Active Problem List   Diagnosis     Migraine     Myalgia     Plantar fasciitis, bilateral     Hip pain, chronic, left     Lumbago       HPI: Questionnaires and records reviewed with the patient today.    Location/Laterality of the pain: neck pain, and right shoulder  Severity: Today: 3   Since last visit pain scores: at best 2. at worst 8. on average 4  Quality: starts at the neck and spreads to center o the head  Timing: constant  Aggravating factors: stress and lack of sleep  Alleviating factors: sleep, medication, medical cannabis  Any New pain, injuries, falls: no  Since last visit pain has: improved  Associated symptoms:    Numbness: -   Weakness: -   Bladder or Bowel loss of control: -   Night pain: +   Fever and/or Chills: -   Unexplained weight loss: -    Headache:  Last week she started to get HA again. Unclear why. She indicates she did have a period when she was not having HA.   Frequency of HA: daily    Duration of HA: all day, constant   Quality of HA: pulsing, throbbing    Location of HA: suboccipital w/ migraine then moves to the center of her head    Severity of HA: 3/10  Aura description: peripheral twinkling; black dots with the migraine, dizziness (this has been better)  #of HA days per mo: daily baseline HA  Rehabilitation: She has completed Craniosacral therapy and we will monitor for any changes over time. Hx of PT. Self massage  Interventional: TPI; Botox injection (did not offer impact and is not considered to be a option)  Prophylactic treatment: verapmil    Abortive Medication for HA: not a candidate r/t the CVA     Treated: Norco and Toradol (assistive), Dark room, sleep, medical cannabis  Associated Mainfestiation of the migraine: Photophobia, watery eyes  Not tried acupuncture afraid of needles, not interested in chiropractic     Functional Symptoms: Pain interferes with:  Sleep: + no changes with the medical cannabis. She has a unique cycle. She is generally functional.   Walking:    Ambulation/Transfer: Pt is ambulatory. Transfers independently.  ADL's: independent   Bathing    Cooking    Dressing    Housekeeping    Toileting    Shopping   Transportation: Driving  Relationships/Social: She is engaged with family      Activities Impaired by Increasing Pain Severity: F= 6  3-Enjoy  4-Work, Enjoy  5-Active, Mood Work Enjoy  6-Sleep, Active, Mood Work Enjoy  7-Walk, Sleep, Active, Mood Work Enjoy  8-Relate, Walk, Sleep, Active, Mood Work Enjoy    Impact of pain treatments:   Patient reports function has improved with current pain treatment: yes    Mood related to pain: Prozac was increased and she is feeling better   Depressed: -   Angry: -   Frustrated: -   Anxious: +   Helpless/Hopeless: -    Pertinent Medical Hx/Safety:   Blood thinners: -   Pregnant or wanting to become pregnant: -   New diagnostics since last visit: -   ED/UC visit since last visit: -   New treatment or New medical condition: -    Pain Plan of Care Review:   Medication:   Last opioid dose was Hydrocodone last dose was a fewdays ago per patient. She is using the medical cannabis generally daily    Medication changes: no  Medication side/adverse effects: no  Aberrant behavior: no      Current Outpatient Medications:      aspirin 81 mg chewable tablet, Chew 81 mg daily., Disp: , Rfl:      azelastine (ASTEPRO) 0.15 % (205.5 mcg) Spry nasal spray, Apply 205.5 mcg into each nostril 2 (two) times a day., Disp: , Rfl:      CHOLECALCIFEROL, VITAMIN D3, (VITAMIN D3 ORAL), Take by mouth., Disp: , Rfl:      DOCOSAHEXANOIC ACID/EPA (FISH OIL ORAL), Take by mouth.,  Disp: , Rfl:      FLUoxetine (PROZAC) 20 MG capsule, TAKE 1 CAPSULE BY MOUTH TWICE A DAY, Disp: 180 capsule, Rfl: 0 - primary care took over management     GARLIC ORAL, Take by mouth., Disp: , Rfl:      hydroCHLOROthiazide (HYDRODIURIL) 12.5 MG tablet, Take 12.5 mg by mouth daily., Disp: , Rfl:      HYDROcodone-acetaminophen (NORCO )  mg per tablet, Take 1 tablet by mouth every 4 (four) hours as needed for pain (max of 4/day and 140/28days)., Disp: 112 tablet, Rfl: 0 - she indicates she is has about 20 pills remaining.      ketorolac (TORADOL) 10 mg tablet, Take 1 tablet (10 mg total) by mouth 2 (two) times a day as needed for pain (max of 10/28 days)., Disp: 10 tablet, Rfl: 0 - does not need a refill at this time     L.ACID/L.CASEI/B.BIF/B.ERIC/FOS (PROBIOTIC BLEND ORAL), Take by mouth., Disp: , Rfl:      losartan-hydrochlorothiazide (HYZAAR) 50-12.5 mg per tablet, Take 1 tablet by mouth., Disp: , Rfl:      melatonin 3 mg TbER, Take 6 mg by mouth., Disp: , Rfl:      multivitamin capsule, Take 1 capsule by mouth daily., Disp: , Rfl:      omeprazole (PRILOSEC) 20 MG capsule, TAKE 1 CAPSULE BY MOUTH EVERY DAY, Disp: 30 capsule, Rfl: 1 - primary care has taken over     polyethylene glycol (GLYCOLAX) 17 gram/dose powder, , Disp: , Rfl:      TiZANidine (ZANAFLEX) 4 MG capsule, Take 3 capsules (12 mg total) by mouth at bedtime as needed for muscle spasms., Disp: 270 capsule, Rfl: 0 - continued     UNABLE TO FIND, Medical Cannabis, Disp: , Rfl:  - continued  No reportable RAMO's caused by medical cannabis. Specific RAMO: unexpected or harmful physical or psychological reaction following the use of medical cannabis that results in any of the following:  (1) death  (2) admission to a hospital  (3) medical treatment beyond basic first aid or mental health care  In the event of a RAMO:  [employer] [phone]  [employer] [phone]  Office of Medical Cannabis at [phone] ([facility]) or  "[phone] (non-[facility]).    Following initiation of Josy SONG Valdez use of medical cannabis there has been positive benefit.    Changes in medication since initiating medical cannabis include reduction in overall opioid load. Has reported her  noticed improvement in being sociable. IBS improved with the medical cannabis       verapamil (CALAN) 80 MG tablet, Take 1 tablet (80 mg total) by mouth 2 (two) times a day., Disp: 180 tablet, Rfl: 0 - continued    Rehabilitation: discussed PT not interested at this time  Home exercise program: She has been walking on her treadmil.    Review of Systems   Constitutional- denies sleep disturbances, + activity intolerance  Musculoskeletal- + pain  Neuro- - cognitive changes  HEENT: + HA  GI: IBS improved   Psych-  + mood concerns (stable),  - taking medication in a fashion other than prescribed    Social  No family history on file.  Social History     Tobacco Use   Smoking Status Former Smoker   Smokeless Tobacco Never Used     Social History     Substance and Sexual Activity   Alcohol Use No    Comment: in recovery     Social History     Substance and Sexual Activity   Drug Use No     Social History     Substance and Sexual Activity   Sexual Activity Not on file       Objective:     Vitals:    03/22/19 1101   BP: 113/72   Pulse: 94   Resp: 16   Weight: 210 lb (95.3 kg)   Height: 5' 5\" (1.651 m)   PainSc:   3       Constitutional:  Pleasant and cooperative female who presents alone today.   Psychiatric: Mood and affect are appropriate for the situation, setting and topic of discussion.  Patient does not appear sedated.  Integumentary:  Observed skin WNL.   HEENT: EOM's grossly intact.    Chest: Breathing is non-labored.   Neurological:  Alert and oriented in all spheres including: time, place, person and situation.    Diagnostics:   Lab:  Notes recorded by Lola Talavera CNP on 1/25/2019 at 4:31 PM CST  Reviewed note 1/22/19 Last opioid dose was Hydrocodone ast dose- " 01/22/2019 @ 700am. Noted pt is on the medical cannabis program  UDT:  Detected Marijuana metabolite (expected); Detected hydrocodone and metabolite (expected)     :  Dated 3/22/2019 reviewed to aid with decision regarding medication management      Assessment:   Josy Valdez is a 54 y.o. female seen in clinic today for migraine HA. Due to CVA she is not a candidate for triptans. Failed Botox. She has completed craniosacral therapy. She is doing a HEP from PT.   She started the medical cannabis program this has been going well.      We are working down and off the opioid medication. She is doing well with the medical cannabis. We have discussed she may need opioids intermittently and she may need them when she travels as the medical cannabis is not able to be transported across state lines.    *Universal Precautions:    UDT/Swab- 01/22/2019  Consent-11/23/18  Agreement- 11/23/8  Pharmacy- as documented    Count- #4 remaining hydrocodone at appt 7/17/15   Psychological evaluation: DA 9/18/15  MME- 40  1/22/19 MME=10  03/21/2019 MME- 10  Pharmacogenetic testing- n/a  MTM: n/a.    Management of opioid medication is inherently a moderate to high complex medial interaction based on the risk management required at each contact r/t risks and side effects.    Plan:   Plan of Care / NextSteps:     Follow up the following date: 2 months      Education:   Please call Monday-Friday for problems or questions and one of the clinical support staff (CSS) will help to get things figured out. The number is [phone].     [employer] is a means to send an e-mail ([employer] message) to communicate any concerns.     Please remember some issues require an office visit.     Today we reviewed the plan of care and answered questions.      Records: Reviewed to assist with preparation for the office visit and are reflected throughout the note.    Primary Care: You need to have an annual physical and check in with your primary care folks on  a regular basis. Talk with your primary care about the frequency of expected visits.     Rehabilitation: Continue with your home exercises    Diagnostics:   Notes recorded by Lola Talavera CNP on 1/25/2019 at 4:31 PM CST  Reviewed note 1/22/19 Last opioid dose was Hydrocodone ast dose- 01/22/2019 @ 700am. Noted pt is on the medical cannabis program  UDT:  Detected Marijuana metabolite (expected); Detected hydrocodone and metabolite (expected)     Medication prescribed / to be continued: norco (will call for a refill if needed in the next 2 months), toradol (call if you need a refill), tizanidine, verapamil, medial cannabis (Crystal)    Medication prescribed today:    Requested Prescriptions     Signed Prescriptions Disp Refills     TiZANidine (ZANAFLEX) 4 MG capsule 4/22-7/21 270 capsule 0     Sig: Take 3 capsules (12 mg total) by mouth at bedtime as needed for muscle spasms.     verapamil (CALAN) 80 MG tablet 4/22-7/21 180 tablet 0     Sig: Take 1 tablet (80 mg total) by mouth 2 (two) times a day.         REFILL INSTRUCTIONS: Please be mindful to chose a single means of communication about medication refills as multiple means of communication for the same prescription request  (your pharmacy, mychart and telephone calls) leads to confusion and delays    Note: Due to the increase in paperwork we are no longer leaving voice mail messages when refills have been sent to the pharmacy    Please contact the clinic 3-7 days before your refill is due. Speak clearly; note cell phones cut in-and-out and poor quality speech and reception issues will influence our ability to hear you and be efficient with your prescription.     Call [phone] leave:   Your name (first and last w/ spelling)   Date of birth  Name of all the medication(s) being requested  Dose of the medication(s)   How you are taking the medication (eg. twice per day etc).     Contact your pharmacy and talk with your pharmacist about any government  Controlled/Scheduled medications 3 (three) days after leaving your message to see if your prescription has been received. Please request the pharmacist check your profile to be certain about any concerns with a script failing to be received.   If the script has not been received there may have been a problem with the communication please reach back out to the clinic.        Patient Arrived @ 1035 for a 1120 appointment.     TT: [phone]    Lola Talavera APRN FNP-BC  [address] [facility] [address]   [phone]  [phone]

## 2021-05-26 NOTE — PATIENT INSTRUCTIONS - HE
Plan:   Plan of Care / NextSteps:     Follow up the following date: 2 months      Education:   Please call Monday-Friday for problems or questions and one of the clinical support staff (CSS) will help to get things figured out. The number is [phone].     [employer] is a means to send an e-mail ([employer] message) to communicate any concerns.     Please remember some issues require an office visit.     Today we reviewed the plan of care and answered questions.      Records: Reviewed to assist with preparation for the office visit and are reflected throughout the note.    Primary Care: You need to have an annual physical and check in with your primary care folks on a regular basis. Talk with your primary care about the frequency of expected visits.     Rehabilitation: Continue with your home exercises    Diagnostics:   Notes recorded by Lola Talavera CNP on 1/25/2019 at 4:31 PM CST  Reviewed note 1/22/19 Last opioid dose was Hydrocodone ast dose- 01/22/2019 @ 700am. Noted pt is on the medical cannabis program  UDT:  Detected Marijuana metabolite (expected); Detected hydrocodone and metabolite (expected)     Medication prescribed / to be continued: norco (will call for a refill if needed in the next 2 months), toradol (call if you need a refill), tizanidine, verapamil, medial cannabis (Crystal)    Medication prescribed today:    Requested Prescriptions     Signed Prescriptions Disp Refills     TiZANidine (ZANAFLEX) 4 MG capsule 4/22-7/21 270 capsule 0     Sig: Take 3 capsules (12 mg total) by mouth at bedtime as needed for muscle spasms.     verapamil (CALAN) 80 MG tablet 4/22-7/21 180 tablet 0     Sig: Take 1 tablet (80 mg total) by mouth 2 (two) times a day.         REFILL INSTRUCTIONS: Please be mindful to chose a single means of communication about medication refills as multiple means of communication for the same prescription request  (your pharmacy, mychart and telephone calls) leads to confusion and  delays    Note: Due to the increase in paperwork we are no longer leaving voice mail messages when refills have been sent to the pharmacy    Please contact the clinic 3-7 days before your refill is due. Speak clearly; note cell phones cut in-and-out and poor quality speech and reception issues will influence our ability to hear you and be efficient with your prescription.     Call [phone] leave:   Your name (first and last w/ spelling)   Date of birth  Name of all the medication(s) being requested  Dose of the medication(s)   How you are taking the medication (eg. twice per day etc).     Contact your pharmacy and talk with your pharmacist about any government Controlled/Scheduled medications 3 (three) days after leaving your message to see if your prescription has been received. Please request the pharmacist check your profile to be certain about any concerns with a script failing to be received.   If the script has not been received there may have been a problem with the communication please reach back out to the clinic.

## 2021-05-26 NOTE — PROGRESS NOTES
Assessment: Dated 3/22/2019 ANJELICA Score: 16    Pain Intensity:  The pain is very mild at the moment    Personal Care:  I can look after myself normally but it causes extra pain.    Lifting:  I can lift heavy weights but it give extra pain    Walking:  Pain prevents me from walking more than 1 mile    Sitting:   I can sit in any chair as long as I like    Standing  I can stand as long as I want but it gives me extra pain    Sleeping  My sleep occasionally disturbed by pain    Sex Life:  My sex life is normal and causes no extra pain    Social life:  My social life is normal but increases the degree of pain    Traveling:  I can travel anywhere but it gives me extra pain

## 2021-05-29 NOTE — PROGRESS NOTES
Patient presents to the clinic today for a follow up with Tricia Bro CNP regarding head pain. Patient describes their pain as 3-constant, dull. Her/His function score is 3.

## 2021-05-29 NOTE — PATIENT INSTRUCTIONS - HE
Plan:   Interventions-    Follow up in 6-12 months or PRN for re-certification. Call with any concerns

## 2021-05-29 NOTE — TELEPHONE ENCOUNTER
Central PA team  [phone]  Pool: HE PA MED ([id number])          PA has been initiated.       PA form completed and faxed insurance via Cover My Meds     Key: [id number]    Medication: HYDROcodone-Acetaminophen 10-325MG tablets  Insurance: [employer]        Response will be received via fax and may take up to 5-10 business days depending on plan

## 2021-05-29 NOTE — PATIENT INSTRUCTIONS - HE
Plan:   Plan of Care / NextSteps:     Follow up by: 7/18/19      Education:   Please call Monday-Friday for problems or questions and one of the clinical support staff (CSS) will help to get things figured out. The number is [phone].     [employer] is a means to send an e-mail ([employer] message) to communicate any concerns.     Please remember some issues require an office visit.     Today we reviewed the plan of care and answered questions.      Records: Reviewed to assist with preparation for the office visit and are reflected throughout the note.    Primary Care: You need to have an annual physical and check in with your primary care folks on a regular basis. Talk with your primary care about the frequency of expected visits.     Rehabilitation: remain active       Medication prescribed / to be continued: norco, toradol (call if you need a refill), tizanidine (call if you need a refill - you may have a script at your pharmacy), verapamil (call if you need a refill you may have a script at your pharmacy), medial cannabis (Crystal)  Medication prescribed today:    Requested Prescriptions     Signed Prescriptions Disp Refills     HYDROcodone-acetaminophen (NORCO )  mg per tablet 5/23-6/20 112 tablet 0     Sig: Take 1 tablet by mouth every 4 (four) hours as needed for pain (max of 4/day and 140/28days).     HYDROcodone-acetaminophen (NORCO )  mg per tablet 6/20-7/18 112 tablet 0     Sig: Take 1 tablet by mouth every 4 (four) hours as needed for pain (max of 4/day and 140/28days).         REFILL INSTRUCTIONS: Please be mindful to chose a single means of communication about medication refills as multiple means of communication for the same prescription request  (your pharmacy, mychart and telephone calls) leads to confusion and delays    Note: Due to the increase in paperwork we are no longer leaving voice mail messages when refills have been sent to the pharmacy    Please contact the clinic  "3-7 days before your refill is due. Speak clearly; note cell phones cut in-and-out and poor quality speech and reception issues will influence our ability to hear you and be efficient with your prescription.     Call [phone] leave:   Your name (first and last w/ spelling)   Date of birth  Name of all the medication(s) being requested  Dose of the medication(s)   How you are taking the medication (eg. twice per day etc).     Contact your pharmacy and talk with your pharmacist about any government Controlled/Scheduled medications 3 (three) days after leaving your message to see if your prescription has been received. Please request the pharmacist check your profile to be certain about any concerns with a script failing to be received.   If the script has not been received there may have been a problem with the communication please reach back out to the clinic.      SAFETY REMINDER  We need to be able to reach you. Please be certain we have a working telephone number. If we are unable to reach you we may not be able to continue to prescribe opioid medication.        FAQ  Q. Why is alcohol consumption a concern with pain medication?  A. Opioids decrease you drive to breathe. Alcohol enhances this effect.  Using together or within a week of each other is unsafe. If we can see it in the urine it is having an effect on your body.     Q. I live with chronic pain and take my medication like it is prescribed why am I at risk for an overdose?  A. Opioids decrease your drive to breathe. Generally a little decrease in drive to breathe is manageable. Illness like bladder infection, pneumonia, COPD, sleep apnea may decrease your ability to get oxygen. This can lead to an overdose.    If you are running a fever medication may be absorbed differently.     Nausea and vomiting have led to folks \"losing\" medication - additional dosing because we do not know how much may have been absorbed can lead to an overdose.     As we age " general health changes occure and this can lead to increased issues with clearing medication from the liver or kidneys increasing the risk of an overdose.    Q. I have been using benzodiazepines for years with my opioids what happened that they should not be used together any more?  A. Combinations of medication can put a person at high risk for overdose. In one study it was noted that 80% of overdoses occurred in people who were taking a combination of opioids and benzodiazepines.    Q. I hear about Naloxone and I understand is a medicine that can be used if there is a concern about an overdose, should I have it available at home?  A. Anyone with an MME over 50 or who uses combinations of medication that enhance the effects of an opioid is a good candidate for Naloxone. If you do not have Naloxone ask me we can talk about it together.    Q. Why should I have a safe?  A. You assume the responsibility of harm or death another person should someone else use your medication. A big concern would be if someone else got your medication. Your medication is not prescribed to anyone other than you. Do not sell, loan, borrow or share your medication with anyone. It is illegal.     Q. What does an overdose look like?  A. Symptoms of overdose include:   !breathing slow and shallow, erratic or not at all  !pinpoint pupils, hallucinations  !confusion  !muscle jerks, slack muscles   !extreme sleepiness or loss of alertness   !awake but not able to talk   !face pale or clammy, vomiting, for lighter skinned people, the skin tone turns bluish purple, for darker skinned people, it turns grayish or ashen   If in a situation where overdose is a concern engage the emergency response system (dial 911).

## 2021-05-29 NOTE — PROGRESS NOTES
Patient is here for a follow up with Jessica Talavera CNP for her headaches        *Universal Precautions:    UDT/Swab- 01/22/2019  Consent-11/23/18  Agreement- 11/23/8  Pharmacy- as documented    Count- #4 remaining hydrocodone at appt 7/17/15   Psychological evaluation: DA 9/18/15  MME- 40  1/22/19 MME=10  03/21/2019 MME- 10   05/23/2019 MME- 10  Pharmacogenetic testing- n/a  MTM: n/a.

## 2021-05-29 NOTE — TELEPHONE ENCOUNTER
Prior Authorization Request  Who s requesting:  Pharmacy/Talavera  Pharm[facility] Name and Location: [facility]  Medication Name: hydrocodone 10/325mg, 4/day  Insurance Plan: [id number]  Insurance Member ID Number:  [id number]  Informed patient that prior authorizations can take up to 10 business days for response:   Yes   72 hrs  Okay to leave a detailed message: Yes

## 2021-05-29 NOTE — PROGRESS NOTES
PAIN CLINIC FOLLOW UP PROGRESS NOTE    CC:  Chief Complaint   Patient presents with     Follow-up     renew medical cannabis, head pain       HPI  Josy Valdez is a 54 y.o. female who presents for evaluation   Chief Complaint   Patient presents with     Follow-up     renew medical cannabis, head pain    that is causing continued pain. Since the last visit the patient denies any trips to the urgent care or ED specifically for their pain. The patient denies any new medications, diagnoses since the last visit. Specific questions indicated the patient wanted addressed today include: Patient presents to re-certify for medical cannabis     Major issues:  1. Chronic pain syndrome    2. Intractable back pain    3. Chronic nonintractable headache, unspecified headache type        Today the pain is located in their head and neck  and is described as constant, and is rated at a 3 on a scale of 1-10. Patient notes that the combination of the medical cannabis and the norco have provided her with great relief.     Associated symptoms: Denies any loss of bladder control, fevers/chills, unintentional weight loss, weakness, numbness or pain that interferes with sleep.   Aggravating factors include: increased stress and lack of sleep   Alleviating factors: Trout medical cannabis.   Adverse effects of medications: NONE  Functional symptoms: affects her ability to sleep and makes her anxious.   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     Physical Exam  Vitals:    06/06/19 0949   BP: 128/68   Patient Site: Left Arm   Patient Position: Sitting   Cuff Size: Adult Large   Pulse: 84   Weight: 210 lb (95.3 kg)   Height: 5' 5\" (1.651 m)       General- patient is alert and oriented, in NAD, well-groomed, well-nourished  Psych- Judgment and insight normal, AOx4, recent and remote memory normal, mood and affect normal  Eyes- pupils are equal and reactive, conjunctiva is clear bilaterally, no ptosis is noted.   Respiratory- breathing is non-labored  Cardiovascular- extremities warm and well perfused, no peripheral edema or varicosities.  Musculoskeletal- gait is normal, extremities with no joint swelling, erythema, or warmth.  Neuro- normal strength, no gait abnormalities, normal sensation to pain, temperature, light touch. Occipital headaches that radiate up from her cervical spine.   Integumentary- no rashes, dermatitis or discolorations noted throughout, no open wounds noted.         Medications    Current Outpatient Medications:      [START ON 6/20/2019] HYDROcodone-acetaminophen (NORCO )  mg per tablet, Take 1 tablet by mouth every 4 (four) hours as needed for pain (max of 4/day and 140/28days)., Disp: 112 tablet, Rfl: 0     UNABLE TO FIND, Medical Cannabis, Disp: , Rfl:      aspirin 81 mg chewable tablet, Chew 81 mg daily., Disp: , Rfl:      azelastine (ASTEPRO) 0.15 % (205.5 mcg) Spry nasal spray, Apply 205.5 mcg into each nostril 2 (two) times a day., Disp: , Rfl:      CHOLECALCIFEROL, VITAMIN D3, (VITAMIN D3 ORAL), Take by mouth., Disp: , Rfl:      DOCOSAHEXANOIC ACID/EPA (FISH OIL ORAL), Take by mouth., Disp: , Rfl:      fluoxetine HCl (PROZAC ORAL), Take 60 mg by mouth., Disp: , Rfl:      GARLIC ORAL, Take by mouth., Disp: , Rfl:      HYDROcodone-acetaminophen (NORCO )  mg per tablet, Take 1 " tablet by mouth every 4 (four) hours as needed for pain (max of 4/day and 140/28days)., Disp: 112 tablet, Rfl: 0     ketorolac (TORADOL) 10 mg tablet, Take 1 tablet (10 mg total) by mouth 2 (two) times a day as needed for pain (max of 10/28 days)., Disp: 10 tablet, Rfl: 0     L.ACID/L.CASEI/B.BIF/B.ERIC/FOS (PROBIOTIC BLEND ORAL), Take by mouth., Disp: , Rfl:      losartan-hydrochlorothiazide (HYZAAR) 50-12.5 mg per tablet, Take 1 tablet by mouth., Disp: , Rfl:      melatonin 3 mg TbER, Take 6 mg by mouth., Disp: , Rfl:      multivitamin capsule, Take 1 capsule by mouth daily., Disp: , Rfl:      omeprazole (PRILOSEC) 20 MG capsule, TAKE 1 CAPSULE BY MOUTH EVERY DAY, Disp: 30 capsule, Rfl: 1     polyethylene glycol (GLYCOLAX) 17 gram/dose powder, , Disp: , Rfl:      TiZANidine (ZANAFLEX) 4 MG capsule, Take 3 capsules (12 mg total) by mouth at bedtime as needed for muscle spasms., Disp: 270 capsule, Rfl: 0     verapamil (CALAN) 80 MG tablet, Take 1 tablet (80 mg total) by mouth 2 (two) times a day., Disp: 180 tablet, Rfl: 0    Lab:  Last UDS on 1/2019 had expected results. Any abnormal results have been discussed with the patient and may change the plan of care. Please see the scanned document from the outside lab under the media tab. This was reviewed with the patient.      Imaging:  No new imaging.      Recent   Dated 6/6/2019 was reviewed with the patient today.   Paper reviewed     Assessment:   Josy Valdez is a 54 y.o. female seen in clinic today for   Chief Complaint   Patient presents with     Follow-up     renew medical cannabis, head pain     Today the patient is able to address her medical cannabis use by answering the questions below. At this time is reports that she feels that her pain control is good and wants to remain on the medical cannabis in conjunction with her opioids to maintain her current function. Renewed today for medical cannabis. Pain is consistently a 3/10.    1. Over the past 6 months  has this patient s use of medical cannabis assisted in reducing dosage or eliminating other medications used for pain? It has reduced opioids by 80%- norco     2. On a scale from 1 (no benefit) to 7 (great deal of benefit), how much benefit has the patient experienced from taking medical cannabis? 5     3. What benefit(s) has the patient experienced as a result of taking medical cannabis? Please list benefits in order of importance (starting with most important benefit) to the patient.  Reduced pain, more activity, IBS has improved and less intense headaches.     4. How much negative impact, if any, do you believe the patient has experienced by taking medical cannabis? (Please exclude cost from your consideration, as patients will receive a separate survey which specifically asks about cost)   (1- no negative effects; 7- a great deal of negative effects) 2     5. What negative effect(s) has the patient experienced as a result of taking medical cannabis? Please list negative effects in order of importance (starting with most important negative effect) to the patient. drowsiness    6. Has the patient experienced any of the following negative effects:  a. Physical side effects related to medical cannabis use (stomach upset, fatigue, headache, blurred vision, etc.)? None     b. Mental/cognitive side effects related to medical cannabis use (mental clouding, confusion, depression, etc.)? none    c. Worsening of symptoms related to the condition being treated? none    d. Difficulty/inconvenience in accessing medical cannabis? Wish there was more dispensaries    e. Other negative effect(s)?- none    7. Do you have additional clinical observations regarding medical cannabis use in this patient?  none     8. Do you have any suggestions to improve the program based on your experience, or any requests for additional information about the program? No fee would be nice    Plan:   Interventions-    Follow up in 6-12 months or PRN  for re-certification. Call with any concerns     Re-certifcation completed today for intractable axial neck pain.  Marijuana is legal in the state [facility] for chronic, intractable pain.Current diagnosis include certain cancer diagonosis, Glaucoma,HIV/AIDS, Tourette Syndrome, Amyotrophic Lateral Sclerosis (ALS). Seizures, including those characteristic of Epilepsy, Multiple Sclerosis, Inflammatory bowel disease, including Crohn s disease. Terminal illness, intractable pain, Post-Traumatic Stress Disorder    The patient Josy Valdez qualify. The registration date is for 1 year after the registration is completed from the state. Until that time, our Pain Center has a no tolerance policy for THC use. Patient is interested in discussing medical marijuana.  The interest in medical marijuana stems from their current diagnosis of   1. Chronic pain syndrome    2. Intractable back pain    3. Chronic nonintractable headache, unspecified headache type     and ongoing pain. Patient was educated about the difference between medical and recreational marijuana.     Patient was educated that currently marijuana remains a Schedule I drug according to the Drug Enforcement Agency ([place]) a branch of the [place] Department of Justice. The [place] license that allows the prescribing of opioid medication does not allow for prescribing of a Schedule I drug.  Schedule I drugs, substances, or chemicals are defined as drugs with no currently accepted medical use and a high potential for abuse. Schedule I drugs are the most dangerous drugs of all the drug schedules with potentially severe psychological or physical dependence. Some examples of Schedule I drugs are: heroin, lysergic acid diethylamide (LSD), marijuana (cannabis), 3,4-methylenedioxymethamphetamine (ecstasy), methamphetamine.  If any Schedule I drugs are found in a random Urine Drug Screen while using medical marijuana this will be reported to the dispensary and re-certification will be  denied.    Patient was educated this treatment will not be covered by insurance. The Pain Center will not advocate for financial coverage of this drug.  This is not a treatment that will be managed through the Pain Center and follow-up with dispensaries would be expected and monitored.  Your pain provider expects to have communication with the dispensary as needed.  If you are certified by another provider, we expect that you notify our Pain Center and bring appropriate documentation.  We expect patients to choose the formulation at the dispensary that is lowest in THC and highest in CBD.      We would expect the dose of the opioids to reduce by at least half if the patient is using medical marijuana.  Pain control, daily function, and reduction in oral medications would be assessed regularly.    Patient Warnings  Important information and warnings about using medical cannabis:  *Use of medical cannabis products is experimental  *Common side effects include dizziness, fatigue, dry mouth, lightheadedness, drowsiness, nausea  *Tell all your health care professionals about the medical cannabis you are using  *The following groups are at increased risk of harm from use - those under 18, women who are pregnant or breast-feeding, persons with a personal or family history of psychotic disorder  *Risks for use by persons with serious heart or liver disease  *Do not drive, operate machinery, or do work that could harm people under the influence of medical cannabis  *Keep medication secure and in their original containers  *Do not use medical cannabis where it is illegal  *Do not give or sell to others medical cannabis that you purchase  *Risk of dependence and addiction    Online information at [place].gov[place]medical[place]annabis      UDT/SWAB:  Patient required a random Urine Drug Testing, due to the need to comply with [place] Model Policy Guidelines and CDC Guideline for the use of any controlled substances. This is to ensure that  patient is compliant with treatment, and monitor for risks such as diversion, abuse, or any other aberrant behaviors. Patient is either being considered for or taking a controlled substance. Unexpected findings will be discussed and treatment decision may be adjusted. Testing is being implemented across the board randomly w/o bias related to age, race, gender, socioeconomic status or [place] affiliation.    The patient understand todays plan and has their questions answered in regards to expectations and current treatment plan.      SAFETY REMINDERS  No alcohol while taking controlled substances. Alcohol is not an illegal substance, it is unsafe to use in combination. It is a build up of substances in the body that can be extremely hazardous and may cause respirations to slow to a dangerous rate resulting in hospitalization, brain damage, or death.    Opioid medications have been associated with sharp rise in unintentional overdose and death.  Overdose is a condition characterized by the consumption in excess of a particular drug causing adverse effects. This can happen b/c you are sick, accidentally or intentionally took an extra dose, are on multiple medication that can interact. Someone took your medication and they are not use to the medication.  Symptoms of overdose include:   !breathing slow and shallow, erratic or not at all  !pinpoint pupils, hallucinations  !confusion  !muscle jerks, slack muscles   !extreme sleepiness or loss of alertness   !awake but not able to talk   !face pale or clammy, vomiting, for lighter skinned people, the skin tone turns bluish purple, for darker skinned people, it turns grayish or ashen   If in a situation where overdose is a concern engage the emergency response system (dial 911).    In one study it was noted that 80% of unintentional overdoses occurred in people who were taking a combination of opioids and benzodiazepines.    Do not sell, loan, borrow or share your opioid  medication with anyone. Deaths have occurred as a result of this practice. It is illegal and patients are being prosecuted.     Prevent unexpected access/loss of medication: Keep medication locked. Only carry what you need with you.    Tricia Bro, [facility]  [address]  [place] [address]  Ph: [phone]  Fax: [phone]

## 2021-05-30 VITALS — WEIGHT: 220 LBS | HEIGHT: 65 IN | BODY MASS INDEX: 36.65 KG/M2

## 2021-05-30 VITALS — BODY MASS INDEX: 36.65 KG/M2 | WEIGHT: 220 LBS | HEIGHT: 65 IN

## 2021-05-30 VITALS — BODY MASS INDEX: 36.65 KG/M2 | HEIGHT: 65 IN | WEIGHT: 220 LBS

## 2021-05-30 NOTE — TELEPHONE ENCOUNTER
Medication being requested: tizanidine 4mg #270  Last visit date: 5/23/19 with Jessica  Next visit date: 7/16/19 with Jessica  Expected follow up: follow up by 7/18/19  MTM visit (Pain Center) date: na  Pertinent between visit information about requested medication (telephone, mychart, prior authorization, concerns, comments): na  Script being sent to provider by nurse- dates and quantity:   Requested Prescriptions     Pending Prescriptions Disp Refills     TiZANidine (ZANAFLEX) 4 MG capsule [Pharmacy Med Name: TIZANIDINE HCL 4 MG CAPSULE] 270 capsule 0     Sig: Take 3 capsules (12 mg total) by mouth at bedtime as needed for muscle spasms.     [facility] c[facility] [place]  Standing orders for withdrawal protocol implemented: na

## 2021-05-30 NOTE — PATIENT INSTRUCTIONS - HE
Plan:   Plan of Care / NextSteps:     Follow up by: 8 weeks      Education:   Please call Monday-Friday for problems or questions and one of the clinical support staff (CSS) will help to get things figured out. The number is [phone].     YEVVO is a means to send an e-mail ([employer] [place] message) to communicate any concerns.     Please remember some issues require an office visit.     Today we reviewed the plan of care and answered questions.      Records: Reviewed to assist with preparation for the office visit and are reflected throughout the note.    Primary Care: You need to have an annual physical and check in with your primary care folks on a regular basis. Talk with your primary care about the frequency of expected visits.     Rehabilitation: Continue exercising    Medication prescribed / to be continued: tizanidine, hydrocodone, toradol, verapamil, medical cannabis    Due to changes in [place] laws, effective 2019, prescriptions for any OPIOID pain relievers must be filled within 30 days of your last written prescription. If you do not fill within 30 days, the prescription will  and you will need a brand new prescription.     In order to provide you with continued access to your prescriptions, we will be switching your prescriptions to a 28 day supply or less. It is your responsibility to get your prescriptions filled before the 30 day expiration date. Failure to do so may cause delays in getting your medications.     Some pharmacies are requesting additional information therefore it is also recommended you have available at the pharmacy a copy of the last visit After Visit Summary and your opioid agreement.       Medication prescribed today: We discussed you will anders for a refill of hydrocodone we will move to 32-40 per month    Requested Prescriptions     Signed Prescriptions Disp Refills     ketorolac (TORADOL) 10 mg tablet 10 tablet 0     Sig: Take 1 tablet (10 mg total) by mouth 2  (two) times a day as needed for pain (max of 10/28 days).     verapamil (CALAN) 80 MG tablet 180 tablet 0     Sig: Take 1 tablet (80 mg total) by mouth 2 (two) times a day.         REFILL INSTRUCTIONS: Please be mindful to chose a single means of communication about medication refills as multiple means of communication for the same prescription request  (your pharmacy, mychart and telephone calls) leads to confusion and delays    Note: Due to the increase in paperwork we are no longer leaving voice mail messages when refills have been sent to the pharmacy    Please contact the clinic 3-7 days before your refill is due. Speak clearly; note cell phones cut in-and-out and poor quality speech and reception issues will influence our ability to hear you and be efficient with your prescription.     Call [phone] leave:   Your name (first and last w/ spelling)   Date of birth  Name of all the medication(s) being requested  Dose of the medication(s)   How you are taking the medication (eg. twice per day etc).     Contact your pharmacy and talk with your pharmacist about any government Controlled/Scheduled medications 3 (three) days after leaving your message to see if your prescription has been received. Please request the pharmacist check your profile to be certain about any concerns with a script failing to be received.   If the script has not been received there may have been a problem with the communication please reach back out to the clinic.

## 2021-05-30 NOTE — PROGRESS NOTES
Assessment: Dated 7/16/2019 ANJELICA Score: 16    Pain Intensity:  The pain is very mild at the moment    Personal Care:  I can look after myself normally but it causes extra pain.    Lifting:  Pain prevents me from lifting heavy weights off the floor, but I can manage if they are conveniently placed eg. on a table    Walking:  Pain does not prevent me walking any distance    Sitting:   I can only sit in my favorite chair as long as I like    Standing  I can stand as long as I want but it gives me extra pain    Sleeping  My sleep occasionally disturbed by pain    Sex Life:  My sex life is normal and causes no extra pain    Social life:  My social life is normal but increases the degree of pain    Traveling:  I can travel anywhere but it gives me extra pain

## 2021-05-30 NOTE — PROGRESS NOTES
Patient is here for a follow up with Jessica Talavera CNP for her headaches              *Universal Precautions:    UDT/Swab- 01/22/2019  Consent-11/23/18  Agreement- 11/23/8  Pharmacy- as documented    Count- #4 remaining hydrocodone at appt 7/17/15   Psychological evaluation: DA 9/18/15  MME- 40  1/22/19 MME=10  03/21/2019 MME- 10   05/23/2019 MME- 10  07/16/19 MME- 10  Pharmacogenetic testing- n/a  MTM: n/a.

## 2021-05-30 NOTE — PROGRESS NOTES
Subjective:   Josy Valdez is a 54 y.o. female who presents for evaluation of pain. Reviewed the rooming evaluation. Patient was last seen 5/23/19 reviewed record.     CC: Pain. Review of current status.     Major issues:  1. Chronic migraine without aura with status migrainosus, not intractable    2. Intractable chronic migraine without aura and with status migrainosus      Patient Active Problem List   Diagnosis     Migraine     Myalgia     Plantar fasciitis, bilateral     Hip pain, chronic, left     Lumbago       HPI: Questionnaires and records reviewed with the patient today.    Location/Laterality of the pain: neck pain, and right shoulder  Severity: Today: 3  Quality: pain in the back of the head moves toward the center  Timing: constant  Aggravating factors: lack of sleep, stress  Alleviating factors: medication, pacing activities  Any New pain, injuries, falls: no  Since last visit pain has: improved  Associated symptoms:    Numbness: -   Weakness: -   Bladder or Bowel loss of control: -   Night pain: +   Fever and/or Chills: -   Unexplained weight loss: -    Headache:   Frequency of HA: daily    Duration of HA: all day, constant   Quality of HA: pulsing, throbbing    Location of HA: suboccipital w/ migraine then moves to the center of her head    Severity of HA: b/t 3-4/10  Aura description: peripheral twinkling; black dots with the migraine, dizziness (has not bother her for some time)  #of HA days per mo: daily baseline HA  Rehabilitation: She has completed Craniosacral therapy and we will monitor for any changes over time. Hx of PT. Self massage  Interventional: TPI; Botox injection (did not offer impact and is not considered to be a option)  Prophylactic treatment: verapmil , medical cannabis  Abortive Medication for HA: not a candidate r/t the CVA, medical cannabis  Treated: Norco and Toradol (assistive), Dark room, sleep, medical cannabis  Associated Mainfestiation of the migraine: Photophobia, watery  eyes  Not tried acupuncture afraid of needles, not interested in chiropractic     Functional Symptoms: Pain interferes with:  Sleep: She has been sleeping sporadically no changes with the medical cannabis.This is a life long sleep pattern. She generally feeling rested and able to participate.   Walking:   Ambulation/Transfer: Pt is ambulatory. Transfers independently.  ADL's:    Bathing independent   Cooking independent. She has been a little less hungry   Dressing independent   Housekeeping She will pause and pace as needed   Toileting independen   Shopping independent  Transportation: Driving  Relationships/Social: Patient is engaged with family and friends in a meaningful fashion.     Activities Impaired by Increasing Pain Severity: F= 6  3-Enjoy  4-Work, Enjoy  5-Active, Mood Work Enjoy  6-Sleep, Active, Mood Work Enjoy  7-Walk, Sleep, Active, Mood Work Enjoy  8-Relate, Walk, Sleep, Active, Mood Work Enjoy    Impact of pain treatments:   Patient reports function has improved with current pain treatment: yes    Mood related to pain:   Depressed: -   Angry: -   Frustrated: -   Anxious: +   Helpless/Hopeless: -    Pertinent Medical Hx/Safety:   Blood thinners: -   Pregnant or wanting to become pregnant: -   New diagnostics since last visit: -   ED/UC visit since last visit: -   New treatment or New medical condition: -    Pain Plan of Care Review:   Medication:   Last opioid dose was Hydrocodone last dose- 07/15/2019 @ 300pm    Medication changes: she has reduced her opioid medication to 8 per week.  Medication side/adverse effects: no  Aberrant behavior: no      Current Outpatient Medications:      aspirin 81 mg chewable tablet, Chew 81 mg daily., Disp: , Rfl:      azelastine (ASTEPRO) 0.15 % (205.5 mcg) Spry nasal spray, Apply 205.5 mcg into each nostril 2 (two) times a day., Disp: , Rfl:      CHOLECALCIFEROL, VITAMIN D3, (VITAMIN D3 ORAL), Take by mouth., Disp: , Rfl:      DOCOSAHEXANOIC ACID/EPA (FISH OIL  ORAL), Take by mouth., Disp: , Rfl:      fluoxetine HCl (PROZAC ORAL), Take 60 mg by mouth., Disp: , Rfl:      GARLIC ORAL, Take by mouth., Disp: , Rfl:      HYDROcodone-acetaminophen (NORCO )  mg per tablet, Take 1 tablet by mouth every 4 (four) hours as needed for pain (max of 4/day and 140/28days)., Disp: 112 tablet, Rfl: 0      HYDROcodone-acetaminophen (NORCO )  mg per tablet, Take 1 tablet by mouth every 4 (four) hours as needed for pain (max of 4/day and 140/28days)., Disp: 112 tablet, Rfl: 0     ketorolac (TORADOL) 10 mg tablet, Take 1 tablet (10 mg total) by mouth 2 (two) times a day as needed for pain (max of 10/28 days)., Disp: 10 tablet, Rfl: 0     L.ACID/L.CASEI/B.BIF/B.ERIC/FOS (PROBIOTIC BLEND ORAL), Take by mouth., Disp: , Rfl:      losartan-hydrochlorothiazide (HYZAAR) 50-12.5 mg per tablet, Take 1 tablet by mouth., Disp: , Rfl:      melatonin 3 mg TbER, Take 6 mg by mouth., Disp: , Rfl:      multivitamin capsule, Take 1 capsule by mouth daily., Disp: , Rfl:      omeprazole (PRILOSEC) 20 MG capsule, TAKE 1 CAPSULE BY MOUTH EVERY DAY, Disp: 30 capsule, Rfl: 1     polyethylene glycol (GLYCOLAX) 17 gram/dose powder, , Disp: , Rfl:      [START ON 7/21/2019] TiZANidine (ZANAFLEX) 4 MG capsule, Take 3 capsules (12 mg total) by mouth at bedtime as needed for muscle spasms., Disp: 270 capsule, Rfl: 0     UNABLE TO FIND, Medical Cannabis, Disp: , Rfl:      verapamil (CALAN) 80 MG tablet, Take 1 tablet (80 mg total) by mouth 2 (two) times a day., Disp: 180 tablet, Rfl: 0    Rehabilitation:   Home exercise program: She has been walking the dog.     Consultation/Specialist:   6/6/19 note reviewed Tricia Bro    Review of Systems   Musculoskeletal- + pain  Neuro- - cognitive changes  HEENT: + HA  GI: IBS - good with the medical cannabis  Psych-  + mood concerns (stable),  - taking medication in a fashion other than prescribed    Social  No family history on file.  Social History  "    Tobacco Use   Smoking Status Former Smoker   Smokeless Tobacco Never Used     Social History     Substance and Sexual Activity   Alcohol Use No    Comment: in recovery     Social History     Substance and Sexual Activity   Drug Use No     Social History     Substance and Sexual Activity   Sexual Activity Not on file       Objective:     Vitals:    07/16/19 1102   BP: 131/86   Pulse: 76   Resp: 16   Weight: 205 lb (93 kg)   Height: 5' 5\" (1.651 m)   PainSc:   3       Constitutional:  Pleasant and cooperative female who presents alone today.   Psychiatric: Mood and affect are appropriate for the situation, setting and topic of discussion.  Patient does not appear sedated.  Integumentary:  Observed skin WNL.  HEENT: EOM's grossly intact.    Chest: Breathing is non-labored.   Neurological:  Alert and oriented in all spheres including: time, place, person and situation.    Diagnostics:   Lab:  Notes recorded by Lola Talavera CNP on 1/25/2019 at 4:31 PM CST  Reviewed note 1/22/19 Last opioid dose was Hydrocodone ast dose- 01/22/2019 @ 700am. Noted pt is on the medical cannabis program  UDT:  Detected Marijuana metabolite (expected); Detected hydrocodone and metabolite (expected)      :  Dated 7/16/2019 reviewed to aid with decision regarding medication management    Assessment: Dated 3/22/2019 ANJELICA Score: 16    Assessment:   Josy Valdez is a 54 y.o. female seen in clinic today for for migraine HA. Due to CVA she is not a candidate for triptans. Failed Botox. She has completed craniosacral therapy. She is doing a HEP from PT.   She started the medical cannabis program this has been going well. At this time she is not interested in the CGRP therapy as she feels stable.       *Universal Precautions:    UDT/Swab- 01/22/2019  Consent-11/23/18  Agreement- 11/23/8  Pharmacy- as documented    Count- #4 remaining hydrocodone at appt 7/17/15   Psychological evaluation: DA 9/18/15  MME- 40  1/22/19 MME=10  03/21/2019 " MME- 10   2019 MME- 10  Pharmacogenetic testing- n/a  MTM: n/a.    Management of opioid medication is inherently a moderate to high complex medial interaction based on the risk management required at each contact r/t risks and side effects.    Plan:   Plan of Care / NextSteps:     Follow up by: 8 weeks      Education:   Please call Monday-Friday for problems or questions and one of the clinical support staff (CSS) will help to get things figured out. The number is [phone].     [employer] is a means to send an e-mail ([employer] message) to communicate any concerns.     Please remember some issues require an office visit.     Today we reviewed the plan of care and answered questions.      Records: Reviewed to assist with preparation for the office visit and are reflected throughout the note.    Primary Care: You need to have an annual physical and check in with your primary care folks on a regular basis. Talk with your primary care about the frequency of expected visits.     Rehabilitation: Continue exercising    Medication prescribed / to be continued: tizanidine, hydrocodone, toradol, verapamil, medical cannabis    Due to changes in [place] laws, effective 2019, prescriptions for any OPIOID pain relievers must be filled within 30 days of your last written prescription. If you do not fill within 30 days, the prescription will  and you will need a brand new prescription.     In order to provide you with continued access to your prescriptions, we will be switching your prescriptions to a 28 day supply or less. It is your responsibility to get your prescriptions filled before the 30 day expiration date. Failure to do so may cause delays in getting your medications.     Some pharmacies are requesting additional information therefore it is also recommended you have available at the pharmacy a copy of the last visit After Visit Summary and your opioid agreement.       Medication prescribed today: We  discussed you will anders for a refill of hydrocodone we will move to 32-40 per month    Requested Prescriptions     Signed Prescriptions Disp Refills     ketorolac (TORADOL) 10 mg tablet 10 tablet 0     Sig: Take 1 tablet (10 mg total) by mouth 2 (two) times a day as needed for pain (max of 10/28 days).     verapamil (CALAN) 80 MG tablet 180 tablet 0     Sig: Take 1 tablet (80 mg total) by mouth 2 (two) times a day.         REFILL INSTRUCTIONS: Please be mindful to chose a single means of communication about medication refills as multiple means of communication for the same prescription request  (your pharmacy, mychart and telephone calls) leads to confusion and delays    Note: Due to the increase in paperwork we are no longer leaving voice mail messages when refills have been sent to the pharmacy    Please contact the clinic 3-7 days before your refill is due. Speak clearly; note cell phones cut in-and-out and poor quality speech and reception issues will influence our ability to hear you and be efficient with your prescription.     Call [phone] leave:   Your name (first and last w/ spelling)   Date of birth  Name of all the medication(s) being requested  Dose of the medication(s)   How you are taking the medication (eg. twice per day etc).     Contact your pharmacy and talk with your pharmacist about any government Controlled/Scheduled medications 3 (three) days after leaving your message to see if your prescription has been received. Please request the pharmacist check your profile to be certain about any concerns with a script failing to be received.   If the script has not been received there may have been a problem with the communication please reach back out to the clinic.      Patient Arrived @ 1050 for a 1120 appointment.     TT: [phone] - [phone]  CT: over half spent in education and counseling reviewing status and plan per HPI    Lola Talavera APRN FNP-BC  [address] [address]    [phone]  [phone]

## 2021-05-30 NOTE — TELEPHONE ENCOUNTER
Completed as cued  Requested Prescriptions     Signed Prescriptions Disp Refills     TiZANidine (ZANAFLEX) 4 MG capsule 270 capsule 0     Sig: Take 3 capsules (12 mg total) by mouth at bedtime as needed for muscle spasms.     Authorizing Provider: DANNI LYNCH     Thank you

## 2021-05-31 ENCOUNTER — RECORDS - HEALTHEAST (OUTPATIENT)
Dept: ADMINISTRATIVE | Facility: CLINIC | Age: 56
End: 2021-05-31

## 2021-05-31 VITALS — BODY MASS INDEX: 36.65 KG/M2 | WEIGHT: 220 LBS | HEIGHT: 65 IN

## 2021-05-31 VITALS — BODY MASS INDEX: 36.65 KG/M2 | HEIGHT: 65 IN | WEIGHT: 220 LBS

## 2021-05-31 VITALS — WEIGHT: 220 LBS | BODY MASS INDEX: 36.65 KG/M2 | HEIGHT: 65 IN

## 2021-05-31 VITALS — HEIGHT: 65 IN | BODY MASS INDEX: 36.65 KG/M2 | WEIGHT: 220 LBS

## 2021-06-01 VITALS — BODY MASS INDEX: 37.49 KG/M2 | HEIGHT: 65 IN | WEIGHT: 225 LBS

## 2021-06-01 VITALS — BODY MASS INDEX: 38.32 KG/M2 | WEIGHT: 230 LBS | HEIGHT: 65 IN

## 2021-06-01 VITALS — WEIGHT: 220 LBS | BODY MASS INDEX: 36.65 KG/M2 | HEIGHT: 65 IN

## 2021-06-01 VITALS — HEIGHT: 65 IN | WEIGHT: 225 LBS | BODY MASS INDEX: 37.49 KG/M2

## 2021-06-01 VITALS — WEIGHT: 220 LBS | HEIGHT: 65 IN | BODY MASS INDEX: 36.65 KG/M2

## 2021-06-01 NOTE — PATIENT INSTRUCTIONS - HE
Plan:   Plan of Care / NextSteps:     Follow up by: 11/5/19      Education:   Please call Monday-Friday for problems or questions and one of the clinical support staff (CSS) will help to get things figured out. The number is [phone].     [facility] is a means to send an e-mail ([employer] message) to communicate any concerns.     Please remember some issues require an office visit.     Today we reviewed the plan of care and answered questions.      Records: Reviewed to assist with preparation for the office visit and are reflected throughout the note.    Primary Care: You need to have an annual physical and check in with your primary care folks on a regular basis. Talk with your primary care about the frequency of expected visits.     Rehabilitation:remai active    Medication prescribed / to be continued: tizanidine, hydrocodone, toradol, verapamil, medical cannabis  Medication prescribed today:    Requested Prescriptions     Signed Prescriptions Disp Refills     HYDROcodone-acetaminophen (NORCO )  mg per tablet 9/10-10/8 - chronic pain 40 tablet 0     Sig: Take 1 tablet by mouth every 4 (four) hours as needed for pain (max of 4/day and 40/28days).     prochlorperazine (COMPAZINE) 10 MG tablet 10 tablet 0     Sig: Take 1 tablet (10 mg total) by mouth every 6 (six) hours as needed for nausea.         REFILL INSTRUCTIONS: Please be mindful to chose a single means of communication about medication refills as multiple means of communication for the same prescription request  (your pharmacy, mychart and telephone calls) leads to confusion and delays    Note: Due to the increase in paperwork we are no longer leaving voice mail messages when refills have been sent to the pharmacy    Please contact the clinic 3-7 days before your refill is due. Speak clearly; note cell phones cut in-and-out and poor quality speech and reception issues will influence our ability to hear you and be efficient with your  prescription.     Call [phone] leave:   Your name (first and last w/ spelling)   Date of birth  Name of all the medication(s) being requested  Dose of the medication(s)   How you are taking the medication (eg. twice per day etc).     Contact your pharmacy and talk with your pharmacist about any government Controlled/Scheduled medications 3 (three) days after leaving your message to see if your prescription has been received. Please request the pharmacist check your profile to be certain about any concerns with a script failing to be received.   If the script has not been received there may have been a problem with the communication please reach back out to the clinic.

## 2021-06-01 NOTE — PROGRESS NOTES
Subjective:   Josy Valdez is a 54 y.o. female who presents for evaluation of pain. Reviewed the rooming evaluation. Patient was last seen 7/16/19 reviewed record.     CC: Pain. Review of current status.     Major issues:  1. Chronic pain syndrome      Patient Active Problem List   Diagnosis     Migraine     Myalgia     Plantar fasciitis, bilateral     Hip pain, chronic, left     Lumbago       HPI: Questionnaires and records reviewed with the patient today.    Location/Laterality of the pain: neck pain, and right shoulder  Severity: Today: 4   Since last visit pain scores: at best 2. at worst 8. on average 4  Quality: pain in the back of the head moves toward the center  Timing: constant  Aggravating factors: lack of sleep, stress  Alleviating factors: medication, pacing activities  Any New pain, injuries, falls: no  Since last visit pain has: not chaned  Associated symptoms:    Numbness: -   Weakness: -   Bladder or Bowel loss of control: -   Night pain: -   Fever and/or Chills: -   Unexplained weight loss: -    Headache: She was great the beginning of summer. She indicates about 1-2 weeks ago she would wake with a HA-nausea/dizziness by the end of the day she would feel better.   Frequency of HA: daily    Duration of HA: all day, constant   Quality of HA: pulsing, throbbing    Location of HA: suboccipital w/ migraine then moves to the center of her head    Severity of HA: b/t 3-4/10  Aura description: peripheral twinkling; black dots with the migraine, dizziness (has not bother her for some time)  #of HA days per mo: daily baseline HA  Rehabilitation: She has completed Craniosacral therapy and we will monitor for any changes over time. Hx of PT. Self massage  Interventional: TPI; Botox injection (did not offer impact and is not considered to be a option)  Prophylactic treatment: verapmil , medical cannabis  Abortive Medication for HA: not a candidate r/t the CVA, medical cannabis  Treated: [place] and Toradol  (assistive), Dark room, sleep, medical cannabis  Associated Mainfestiation of the migraine: Photophobia, watery eyes  Not tried acupuncture afraid of needles, not interested in chiropractic     Functional Symptoms: Pain interferes with:  Sleep: +  Walking:    Ambulation/Transfer: Pt is ambulatory. Transfers independently.  ADL's: independent but slower   Bathing    Cooking    Dressing   Housekeeping   Toileting    Shopping   Transportation: Driving  Relationships/Social:She is needing to sell her mothers home to support her living in an assisted living    Impact of pain treatments:   Patient reports function has improved with current pain treatment: yes    Mood related to pain:   Depressed: -   Angry: -   Frustrated: -   Anxious: +   Helpless/Hopeless: -    Pertinent Medical Hx/Safety:   Blood thinners: -   Pregnant or wanting to become pregnant: -   New diagnostics since last visit: -   ED/UC visit since last visit: -   New treatment or New medical condition: -    Pain Plan of Care Review:   Medication:   Last opioid dose was hydrocodone at 1000 this am      pt uses medical cannabis    Medication changes: no  Medication side/adverse effects: no  Aberrant behavior: no      Current Outpatient Medications:      aspirin 81 mg chewable tablet, Chew 81 mg daily., Disp: , Rfl:      azelastine (ASTEPRO) 0.15 % (205.5 mcg) Spry nasal spray, Apply 205.5 mcg into each nostril 2 (two) times a day., Disp: , Rfl:      CHOLECALCIFEROL, VITAMIN D3, (VITAMIN D3 ORAL), Take by mouth., Disp: , Rfl:      DOCOSAHEXANOIC ACID/EPA (FISH OIL ORAL), Take by mouth., Disp: , Rfl:      fluoxetine HCl (PROZAC ORAL), Take 60 mg by mouth., Disp: , Rfl: - contineud     GARLIC ORAL, Take by mouth., Disp: , Rfl:      HYDROcodone-acetaminophen (NORCO )  mg per tablet, Take 1 tablet by mouth every 4 (four) hours as needed for pain (max of 4/day and 140/28days)., Disp: 112 tablet, Rfl: 0 - continued she could use a refill     ketorolac  "(TORADOL) 10 mg tablet, Take 1 tablet (10 mg total) by mouth 2 (two) times a day as needed for pain (max of 10/28 days)., Disp: 10 tablet, Rfl: 0 - rare use no refill needed     L.ACID/L.CASEI/B.BIF/B.ERIC/FOS (PROBIOTIC BLEND ORAL), Take by mouth., Disp: , Rfl:      losartan-hydrochlorothiazide (HYZAAR) 50-12.5 mg per tablet, Take 1 tablet by mouth., Disp: , Rfl:      melatonin 3 mg TbER, Take 6 mg by mouth., Disp: , Rfl:      multivitamin capsule, Take 1 capsule by mouth daily., Disp: , Rfl:      omeprazole (PRILOSEC) 20 MG capsule, TAKE 1 CAPSULE BY MOUTH EVERY DAY, Disp: 30 capsule, Rfl: 1     polyethylene glycol (GLYCOLAX) 17 gram/dose powder, , Disp: , Rfl:      TiZANidine (ZANAFLEX) 4 MG capsule, Take 3 capsules (12 mg total) by mouth at bedtime as needed for muscle spasms., Disp: 270 capsule, Rfl: 0 -continued     UNABLE TO FIND, Medical Cannabis, Disp: , Rfl: leafline     verapamil (CALAN) 80 MG tablet, Take 1 tablet (80 mg total) by mouth 2 (two) times a day., Disp: 180 tablet, Rfl: 0 -contineud      Rehabilitation:   Home exercise program: She has been walking a lot with dog/ She indicates she did over walk at one point.      Review of Systems   Musculoskeletal- + pain  Neuro- - cognitive changes  HEENT: + HA  GI: IBS - good with the medical cannabis  Psych-  + mood concerns (stable),  - taking medication in a fashion other than prescribed    Social  No family history on file.  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   Psychiatric: Mood and affect are appropriate for the situation, setting and topic of discussion.  Patient does not appear sedated.  Integumentary:  Observed skin WNL.   HEENT: EOM's grossly intact.    Chest: Breathing is non-labored.   Neurological:  Alert and oriented in all spheres including: time, place, person and situation.    Diagnostics:   Lab:  Notes recorded by Lola Talavera CNP on 1/25/2019 at 4:31 PM CST  Reviewed note 1/22/19 Last opioid dose was Hydrocodone ast dose- 01/22/2019 @ 700am. Noted pt is on the medical cannabis program  UDT:  Detected Marijuana metabolite (expected); Detected hydrocodone and metabolite (expected)      :  Dated 9/10/2019 reviewed to aid with decision regarding medication management    Assessment: Dated 7/16/2019 ANJELICA Score: 16  Assessment: Dated 3/22/2019 ANJELICA Score: 16      Assessment:   Josy Valdez is a 54 y.o. female seen in clinic today for migraine HA. Due to CVA she is not a candidate for triptans. Failed Botox. She has completed craniosacral therapy. She is doing a HEP from PT.   She started the medical cannabis program this has been going well. At this time she is not interested in the CGRP therapy as she feels stable.       *Universal Precautions:    UDT/Swab- 01/22/2019  Consent-11/23/18  Agreement- 11/23/8  Pharmacy- as documented    Count- #4 remaining hydrocodone at appt 7/17/15   Psychological evaluation: DA 9/18/15  MME- 40  1/22/19 MME=10  03/21/2019 MME- 10   05/23/2019 MME- 10  Pharmacogenetic testing- n/a  MTM: n/a.       Management of opioid medication is inherently a moderate to high complex medial interaction based on the risk management required at each contact r/t risks and side effects.    Plan:   Plan of Care / NextSteps:     Follow up by: 11/5/19      Education:   Please call Monday-Friday for problems or questions and one of the clinical support staff (CSS) will help to get things figured out. The number  is [phone].     [employer] is a means to send an e-mail ([employer] message) to communicate any concerns.     Please remember some issues require an office visit.     Today we reviewed the plan of care and answered questions.      Records: Reviewed to assist with preparation for the office visit and are reflected throughout the note.    Primary Care: You need to have an annual physical and check in with your primary care folks on a regular basis. Talk with your primary care about the frequency of expected visits.     Rehabilitation:remai active    Medication prescribed / to be continued: tizanidine, hydrocodone, toradol, verapamil, medical cannabis  Medication prescribed today:    Requested Prescriptions     Signed Prescriptions Disp Refills     HYDROcodone-acetaminophen (NORCO )  mg per tablet 9/10-10/8 - chronic pain 40 tablet 0     Sig: Take 1 tablet by mouth every 4 (four) hours as needed for pain (max of 4/day and 40/28days).     prochlorperazine (COMPAZINE) 10 MG tablet 10 tablet 0     Sig: Take 1 tablet (10 mg total) by mouth every 6 (six) hours as needed for nausea.         REFILL INSTRUCTIONS: Please be mindful to chose a single means of communication about medication refills as multiple means of communication for the same prescription request  (your pharmacy, mychart and telephone calls) leads to confusion and delays    Note: Due to the increase in paperwork we are no longer leaving voice mail messages when refills have been sent to the pharmacy    Please contact the clinic 3-7 days before your refill is due. Speak clearly; note cell phones cut in-and-out and poor quality speech and reception issues will influence our ability to hear you and be efficient with your prescription.     Call [phone] leave:   Your name (first and last w/ spelling)   Date of birth  Name of all the medication(s) being requested  Dose of the medication(s)   How you are taking the medication (eg. twice per day  etc).     Contact your pharmacy and talk with your pharmacist about any government Controlled/Scheduled medications 3 (three) days after leaving your message to see if your prescription has been received. Please request the pharmacist check your profile to be certain about any concerns with a script failing to be received.   If the script has not been received there may have been a problem with the communication please reach back out to the clinic.      Lola Talavera APRN FNP-BC  [address] [facility] [address]   [phone]  [phone]

## 2021-06-01 NOTE — PROGRESS NOTES
Pt is being seen today by Jessica Talavera CNP, for f/u of 4-constant, throbbing head, neck and back pain.    F3

## 2021-06-02 ENCOUNTER — RECORDS - HEALTHEAST (OUTPATIENT)
Dept: ADMINISTRATIVE | Facility: CLINIC | Age: 56
End: 2021-06-02

## 2021-06-02 VITALS — BODY MASS INDEX: 36.65 KG/M2 | HEIGHT: 65 IN | WEIGHT: 220 LBS

## 2021-06-02 VITALS — HEIGHT: 65 IN | WEIGHT: 210 LBS | BODY MASS INDEX: 34.99 KG/M2

## 2021-06-02 VITALS — HEIGHT: 65 IN | WEIGHT: 220 LBS | BODY MASS INDEX: 36.65 KG/M2

## 2021-06-02 VITALS — HEIGHT: 65 IN | BODY MASS INDEX: 36.65 KG/M2 | WEIGHT: 220 LBS

## 2021-06-02 VITALS — BODY MASS INDEX: 36.65 KG/M2 | WEIGHT: 220 LBS | HEIGHT: 65 IN

## 2021-06-02 NOTE — TELEPHONE ENCOUNTER
Completed  Requested Prescriptions     Signed Prescriptions Disp Refills     TiZANidine (ZANAFLEX) 4 MG capsule 270 capsule 0     Sig: Take 3 capsules (12 mg total) by mouth at bedtime as needed for muscle spasms.     Authorizing Provider: DANNI LYNCH

## 2021-06-02 NOTE — TELEPHONE ENCOUNTER
Medication being requested: Verapamil  Last visit date: 09/10 Provider: JUANA     06/06   GINGER  Next visit date: 11/05Provider: JUANA    12/05  GINGER  Expected follow up: By 11/05-JUANA        6 months-CM  MTM visit (Pain Center) date: N/A  Pertinent between visit information about requested medication (telephone, mychart, prior authorization, concerns):   Last date prescribed: 07/21-10/19  Provider responsible: JUANA  Spoke with patient: N/A  Script being sent to provider - dates and quantity:   Requested Prescriptions     Pending Prescriptions Disp Refills     verapamil (CALAN) 80 MG tablet [Pharmacy Med Name: VERAPAMIL 80 MG TABLET] 180 tablet 0     Sig: TAKE 1 TABLET (80 MG TOTAL) BY MOUTH 2 (TWO) TIMES A DAY.     Pharmacy cued: [facility]

## 2021-06-02 NOTE — TELEPHONE ENCOUNTER
Completed  Requested Prescriptions     Signed Prescriptions Disp Refills     verapamil (CALAN) 80 MG tablet 180 tablet 0     Sig: TAKE 1 TABLET (80 MG TOTAL) BY MOUTH 2 (TWO) TIMES A DAY.     Authorizing Provider: DANNI LYNCH

## 2021-06-02 NOTE — TELEPHONE ENCOUNTER
Medication being requested: Tizanidine  Last visit date: 09/10 Provider: JUANA  Next visit date: 11/05 Provider: JUANA  Expected follow up: By 11/05  MTM visit (Pain Center) date: N/A  Pertinent between visit information about requested medication (telephone, mychart, prior authorization, concerns): N/A  Last date prescribed: 07/21-10/19  Provider responsible: JUANA  Spoke with patient: No  Script being sent to provider - dates and quantity:   Requested Prescriptions     Pending Prescriptions Disp Refills     TiZANidine (ZANAFLEX) 4 MG capsule [Pharmacy Med Name: TIZANIDINE HCL 4 MG CAPSULE] 270 capsule 0     Sig: TAKE 3 CAPSULES (12 MG TOTAL) BY MOUTH AT BEDTIME AS NEEDED FOR MUSCLE SPASMS.   10/21-01/19  [facility]

## 2021-06-03 VITALS — BODY MASS INDEX: 34.16 KG/M2 | WEIGHT: 205 LBS | HEIGHT: 65 IN

## 2021-06-03 VITALS — BODY MASS INDEX: 34.16 KG/M2 | HEIGHT: 65 IN | WEIGHT: 205 LBS

## 2021-06-03 VITALS — HEIGHT: 65 IN | WEIGHT: 210 LBS | BODY MASS INDEX: 34.99 KG/M2

## 2021-06-03 VITALS — HEIGHT: 65 IN | BODY MASS INDEX: 34.99 KG/M2 | WEIGHT: 210 LBS

## 2021-06-03 NOTE — PROGRESS NOTES
Subjective:   Josy Valdez is a 54 y.o. female who presents to [facility] for evaluation of pain. Reviewed the rooming evaluation. Patient was last seen 9/10/19 reviewed record.     CC: Pain. Review of current status.     Major issues:  1. Chronic migraine without aura with status migrainosus, not intractable    2. Nausea    3. Intractable chronic migraine without aura and without status migrainosus    4. Intractable chronic migraine without aura and with status migrainosus      Patient Active Problem List   Diagnosis     Migraine     Myalgia     Plantar fasciitis, bilateral     Hip pain, chronic, left     Lumbago       HPI: Questionnaires and records reviewed with the patient today.    Location/Laterality of the pain: neck pain,  Head - center of the head and back of the skull  Severity: Today: 4   Since last visit pain scores: at best 3. at worst 8. on average 4  Timing: constant  Aggravating factors: lack of sleep, stress  Alleviating factors: sleep, medication  Any New pain, injuries, falls: no  Since last visit pain has: not changed  Associated symptoms:    Numbness: -   Weakness: -   Bladder or Bowel loss of control: -   Night pain: +   Fever and/or Chills: -   Unexplained weight loss: -    Headache: not perfect but better since her last visit.   Frequency of HA: daily    Duration of HA: all day, constant   Quality of HA: pulsing, throbbing    Location of HA: suboccipital w/ migraine then moves to the center of her head    Severity of HA: 4/10  Aura description: peripheral twinkling; black dots with the migraine, dizziness (has not bother her for some time)  #of HA days per mo: daily baseline HA  Rehabilitation: She has completed Craniosacral therapy and we will monitor for any changes over time. Hx of PT. Self massage  Interventional: TPI; Botox injection (did not offer impact and is not considered to be a option)  Prophylactic treatment: verapmil , medical cannabis  Abortive  Medication for HA: not a candidate r/t the CVA, medical cannabis  Treated: Norco and Toradol (assistive), Dark room, sleep, medical cannabis  Associated Mainfestiation of the migraine: Photophobia, watery eyes  Not tried acupuncture afraid of needles, not interested in chiropractic     Functional Symptoms: Pain interferes with:  Sleep: +  Walking:   Ambulation/Transfer: Pt is ambulatory. Transfers independently.  ADL's: She is doing well no specific complaints at this time  Transportation: Driving  Relationships/Social: mother passed    Impact of pain treatments:   Patient reports function has improved with current pain treatment: yes    Mood related to pain:   Depressed: -   Angry: -   Frustrated: -   Anxious: +   Helpless/Hopeless: -    Pertinent Medical Hx/Safety:   Blood thinners: -   Pregnant or wanting to become pregnant: -   New diagnostics since last visit: -   ED/UC visit since last visit: -   New treatment or New medical condition: -    Pain Plan of Care Review:   Medication:   Last opioid dose was hydrocodone 11/5/19 0900; medical cannabis 11/4/19    Medication changes: no  Medication side/adverse effects: no  Aberrant behavior: no      Current Outpatient Medications:      aspirin 81 mg chewable tablet, Chew 81 mg daily., Disp: , Rfl:      azelastine (ASTEPRO) 0.15 % (205.5 mcg) Spry nasal spray, Apply 205.5 mcg into each nostril 2 (two) times a day., Disp: , Rfl:      CHOLECALCIFEROL, VITAMIN D3, (VITAMIN D3 ORAL), Take by mouth., Disp: , Rfl:      DOCOSAHEXANOIC ACID/EPA (FISH OIL ORAL), Take by mouth., Disp: , Rfl:      fluoxetine HCl (PROZAC ORAL), Take 60 mg by mouth., Disp: , Rfl:      GARLIC ORAL, Take by mouth., Disp: , Rfl:      HYDROcodone-acetaminophen (NORCO )  mg per tablet, Take 1 tablet by mouth every 4 (four) hours as needed for pain (max of 4/day and 40/28days)., Disp: 40 tablet, Rfl: 0 - continued     ketorolac (TORADOL) 10 mg tablet, Take 1 tablet (10 mg total) by mouth 2  "(two) times a day as needed for pain (max of 10/28 days)., Disp: 10 tablet, Rfl: 0 - continued     L.ACID/L.CASEI/B.BIF/B.ERIC/FOS (PROBIOTIC BLEND ORAL), Take by mouth., Disp: , Rfl:      losartan-hydrochlorothiazide (HYZAAR) 50-12.5 mg per tablet, Take 1 tablet by mouth., Disp: , Rfl:      melatonin 3 mg TbER, Take 6 mg by mouth., Disp: , Rfl:      multivitamin capsule, Take 1 capsule by mouth daily., Disp: , Rfl:      omeprazole (PRILOSEC) 20 MG capsule, TAKE 1 CAPSULE BY MOUTH EVERY DAY, Disp: 30 capsule, Rfl: 1     polyethylene glycol (GLYCOLAX) 17 gram/dose powder, , Disp: , Rfl:      prochlorperazine (COMPAZINE) 10 MG tablet, Take 1 tablet (10 mg total) by mouth every 6 (six) hours as needed for nausea., Disp: 10 tablet, Rfl: 0     TiZANidine (ZANAFLEX) 4 MG capsule, Take 3 capsules (12 mg total) by mouth at bedtime as needed for muscle spasms., Disp: 270 capsule, Rfl: 0 - contineud     UNABLE TO FIND, Medical Cannabis, Disp: , Rfl:  - contineud     verapamil (CALAN) 80 MG tablet, TAKE 1 TABLET (80 MG TOTAL) BY MOUTH 2 (TWO) TIMES A DAY., Disp: 180 tablet, Rfl: 0 - continued    Rehabilitation:   Home exercise program: walking the dog    Review of Systems   Musculoskeletal- + pain  Neuro- - cognitive changes  HEENT: + HA  GI: IBS - good with the medical cannabis  Psych-  + mood concerns (stable),  - taking medication in a fashion other than prescribed, loss and grief    Social  No family history on file.  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 She is wearing make up today it may be the first time I have seen her do so.   Psychiatric: Mood and affect are appropriate for the situation, setting and topic of discussion.  Patient does not appear sedated.  Integumentary:  Observed skin WNL.  HEENT: EOM's grossly intact.    Chest: Breathing is non-labored.   Neurological:  Alert and oriented in all spheres including: time, place, person and situation.    Diagnostics:   Lab:  Notes recorded by Lola Talavera CNP on 1/25/2019 at 4:31 PM CST  Reviewed note 1/22/19 Last opioid dose was Hydrocodone ast dose- 01/22/2019 @ 700am. Noted pt is on the medical cannabis program  UDT:  Detected Marijuana metabolite (expected); Detected hydrocodone and metabolite (expected    :  Dated 11/5/2019 reviewed to aid with decision regarding medication management    Assessment: Dated 7/16/2019 ANJELICA Score: 16  Assessment: Dated 3/22/2019 ANJELICA Score: 16    Assessment:   Josy Valdez is a 54 y.o. female seen in clinic today for migraine HA. Due to CVA she is not a candidate for triptans. Failed Botox. She has completed craniosacral therapy. She is doing a HEP from PT.   She started the medical cannabis program this has been going well. At this time she is not interested in the CGRP therapy as she feels stable.       *Universal Precautions:    UDT/Swab- 01/22/2019  Consent-11/23/18  Agreement- 11/23/8  Pharmacy- as documented    Count- #4 remaining hydrocodone at appt 7/17/15   Psychological evaluation: DA 9/18/15  MME- 40  1/22/19 MME=10  03/21/2019 MME- 10   05/23/2019 MME- 10  Pharmacogenetic testing- n/a  MTM: n/a.    Management of opioid medication is inherently a moderate to high complex medial interaction based on the risk management required at each contact r/t risks and side effects.    Plan:   Plan of Care / NextSteps:     Follow up by: 12/31/19      Education:  We are now titled [facility]  [place].    Please call Monday-Friday for problems or questions and one of the clinical support staff (CSS) will help to get things figured out. The number is [phone].     [employer] is a means to send an e-mail ([employer] message) to communicate any concerns.     Please remember some issues require an office visit.     Today we reviewed the plan of care and answered questions.      Records: Reviewed to assist with preparation for the office visit and are reflected throughout the note.    Primary Care: You need to have an annual physical and check in with your primary care folks on a regular basis. Talk with your primary care about the frequency of expected visits.       Medication prescribed / to be continued: tizanidine, hydrocodone, toradol, verapamil, medical cannabis  Medication prescribed today:    Requested Prescriptions     Signed Prescriptions Disp Refills     HYDROcodone-acetaminophen (NORCO )  mg per tablet 11/5-12/3 - chronic pain 40 tablet 0     Sig: Take 1 tablet by mouth every 4 (four) hours as needed for pain (max of 4/day and 40/28days).     ketorolac (TORADOL) 10 mg tablet 10 tablet 2     Sig: Take 1 tablet (10 mg total) by mouth 2 (two) times a day as needed for pain (max of 10/28 days).     prochlorperazine (COMPAZINE) 10 MG tablet 10 tablet 2     Sig: Take 1 tablet (10 mg total) by mouth every 6 (six) hours as needed for nausea.     HYDROcodone-acetaminophen (NORCO )  mg per tablet Please fill 12/3-12/31 - chronic pain 40 tablet 0     Sig: Take 1 tablet by mouth every 4 (four) hours as needed for pain (max of 4/day and 40/28days).         REFILL INSTRUCTIONS:Please contact your pharmacy and talk with your pharmacist for any refills of controlled substances. It will be beneficial to know the name of your medication, the date it was written ( 11/5/2019 ) and the date you are able to fill. Please remember the date filled and the date started in some cases are different.  Please remember to use your medication during the dates of use.      Patient Arrived @ 1102 for a 1140 appointment.     TT: 2525 - 1144  CT: over half spent in education and counseling reviewing status and plan per HPI, loss grief and transition    Lola Talavera APRN FNP-BC  [address] [facility] [address]   [phone]  [phone]

## 2021-06-03 NOTE — PATIENT INSTRUCTIONS - HE
Plan:   Plan of Care / NextSteps:     Follow up by: 12/31/19      Education:  We are now titled [facility].    Please call Monday-Friday for problems or questions and one of the clinical support staff (CSS) will help to get things figured out. The number is [phone].     Green A is a means to send an e-mail ([employer] message) to communicate any concerns.     Please remember some issues require an office visit.     Today we reviewed the plan of care and answered questions.      Records: Reviewed to assist with preparation for the office visit and are reflected throughout the note.    Primary Care: You need to have an annual physical and check in with your primary care folks on a regular basis. Talk with your primary care about the frequency of expected visits.       Medication prescribed / to be continued: tizanidine, hydrocodone, toradol, verapamil, medical cannabis  Medication prescribed today:    Requested Prescriptions     Signed Prescriptions Disp Refills     HYDROcodone-acetaminophen (NORCO )  mg per tablet 11/5-12/3 - chronic pain 40 tablet 0     Sig: Take 1 tablet by mouth every 4 (four) hours as needed for pain (max of 4/day and 40/28days).     ketorolac (TORADOL) 10 mg tablet 10 tablet 2     Sig: Take 1 tablet (10 mg total) by mouth 2 (two) times a day as needed for pain (max of 10/28 days).     prochlorperazine (COMPAZINE) 10 MG tablet 10 tablet 2     Sig: Take 1 tablet (10 mg total) by mouth every 6 (six) hours as needed for nausea.     HYDROcodone-acetaminophen (NORCO )  mg per tablet Please fill 12/3-12/31 - chronic pain 40 tablet 0     Sig: Take 1 tablet by mouth every 4 (four) hours as needed for pain (max of 4/day and 40/28days).         REFILL INSTRUCTIONS:Please contact your pharmacy and talk with your pharmacist for any refills of controlled substances. It will be beneficial to know the name of your medication, the date it was written (  11/5/2019 ) and the date you are able to fill. Please remember the date filled and the date started in some cases are different. Please remember to use your medication during the dates of use.

## 2021-06-03 NOTE — PROGRESS NOTES
Patient presents to the clinic today for a follow up with Lola Talavera CNP regarding migraines. Patient describes their pain as dull. Her/His function score is 6.    Assessment: Dated 2019 ANJELICA Score: 13     Pain Intensity:  1 - The pain is very mild at the moment    Personal Care:  1- I can look after myself normally but it causes extra pain.    Liftin - Pain prevents me from lifting heavy weights off the floor, but I can manage if they are conveniently placed eg. on a table    Walkin - Pain prevents me from walking more than 1 mile    Sittin - I can only sit in my favorite chair as long as I like    Standing  2 - Pain prevents me from standing more than 1 hour    Sleeping  2 - Because of pain I have less than 6 hours sleep    Sex Life:  1 - My sex life is normal but causes some  extra pain    Social life:  1 - My social life is normal but increases the degree of pain    Travelin - I can travel anywhere but it gives me extra pain

## 2021-06-04 VITALS — BODY MASS INDEX: 34.16 KG/M2 | HEIGHT: 65 IN | WEIGHT: 205 LBS

## 2021-06-04 VITALS — WEIGHT: 205 LBS | HEIGHT: 65 IN | BODY MASS INDEX: 34.16 KG/M2

## 2021-06-04 VITALS — WEIGHT: 212 LBS | HEIGHT: 65 IN | BODY MASS INDEX: 35.32 KG/M2

## 2021-06-04 NOTE — PROGRESS NOTES
PAIN CLINIC FOLLOW UP PROGRESS NOTE    CC:  Chief Complaint   Patient presents with     Follow-up       HPI  Josy Valdez is a 54 y.o. female who presents for evaluation   Chief Complaint   Patient presents with     Follow-up    that is causing continued pain. Since the last visit the patient denies any trips to the urgent care or ED specifically for their pain. The patient denies any new medications, diagnoses since the last visit. Specific questions indicated the patient wanted addressed today include: patient is here for renewal of her medical cannabis      Major issues:  1. Chronic pain syndrome        Today the pain is located in their head and neck  and is described as constant, and is rated at a 3 on a scale of 1-10. Patient notes that the combination of the medical cannabis and the norco have provided her with great relief.      Associated symptoms: Denies any loss of bladder control, fevers/chills, unintentional weight loss, weakness, numbness or pain that interferes with sleep.   Aggravating factors include: increased stress and lack of sleep   Alleviating factors: [place] medical cannabis.   Adverse effects of medications: NONE  Functional symptoms: affects her ability to sleep and makes her anxious.   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    She is currently using tangerine pills, liquids and vapes and notes that this is helpful in reducing her pain.      Review of Systems:  12 point systems were reviewed with pt as documented on pt health form and the patient denies any  "new diagnosis or changes in 12 point system review since the last visit.     Physical Exam  Vitals:    12/05/19 1003   BP: 147/83   Pulse: 88   Resp: 16   Weight: 205 lb (93 kg)   Height: 5' 5\" (1.651 m)       General- patient is alert and oriented, in NAD, well-groomed, well-nourished  Psych- Judgment and insight normal, AOx4, recent and remote memory normal, mood and affect normal  Eyes- pupils are equal and reactive, conjunctiva is clear bilaterally, no ptosis is noted.   Respiratory- breathing is non-labored  Cardiovascular- extremities warm and well perfused, no peripheral edema or varicosities.  Musculoskeletal- gait is normal, extremities with no joint swelling, erythema, or warmth.  Neuro- normal strength, no gait abnormalities, normal sensation to pain, temperature, light touch. Occipital headaches that radiate up from her cervical spine that have recently gotten worse.   Integumentary- no rashes, dermatitis or discolorations noted throughout, no open wounds noted.    Medications    Current Outpatient Medications:      aspirin 81 mg chewable tablet, Chew 81 mg daily., Disp: , Rfl:      atorvastatin (LIPITOR) 20 MG tablet, Take 20 mg by mouth., Disp: , Rfl:      azelastine (ASTEPRO) 0.15 % (205.5 mcg) Spry nasal spray, Apply 205.5 mcg into each nostril 2 (two) times a day., Disp: , Rfl:      CHOLECALCIFEROL, VITAMIN D3, (VITAMIN D3 ORAL), Take by mouth., Disp: , Rfl:      DOCOSAHEXANOIC ACID/EPA (FISH OIL ORAL), Take by mouth., Disp: , Rfl:      fluoxetine HCl (PROZAC ORAL), Take 60 mg by mouth., Disp: , Rfl:      GARLIC ORAL, Take by mouth., Disp: , Rfl:      HYDROcodone-acetaminophen (NORCO )  mg per tablet, Take 1 tablet by mouth every 4 (four) hours as needed for pain (max of 4/day and 40/28days)., Disp: 40 tablet, Rfl: 0     HYDROcodone-acetaminophen (NORCO )  mg per tablet, Take 1 tablet by mouth every 4 (four) hours as needed for pain (max of 4/day and 40/28days)., Disp: 40 " tablet, Rfl: 0     ketorolac (TORADOL) 10 mg tablet, Take 1 tablet (10 mg total) by mouth 2 (two) times a day as needed for pain (max of 10/28 days)., Disp: 10 tablet, Rfl: 2     L.ACID/L.CASEI/B.BIF/B.ERIC/FOS (PROBIOTIC BLEND ORAL), Take by mouth., Disp: , Rfl:      losartan-hydrochlorothiazide (HYZAAR) 50-12.5 mg per tablet, Take 1 tablet by mouth., Disp: , Rfl:      melatonin 3 mg TbER, Take 6 mg by mouth., Disp: , Rfl:      multivitamin capsule, Take 1 capsule by mouth daily., Disp: , Rfl:      omeprazole (PRILOSEC) 20 MG capsule, TAKE 1 CAPSULE BY MOUTH EVERY DAY, Disp: 30 capsule, Rfl: 1     polyethylene glycol (GLYCOLAX) 17 gram/dose powder, , Disp: , Rfl:      prochlorperazine (COMPAZINE) 10 MG tablet, Take 1 tablet (10 mg total) by mouth every 6 (six) hours as needed for nausea., Disp: 10 tablet, Rfl: 2     TiZANidine (ZANAFLEX) 4 MG capsule, Take 3 capsules (12 mg total) by mouth at bedtime as needed for muscle spasms., Disp: 270 capsule, Rfl: 0     UNABLE TO FIND, Medical Cannabis, Disp: , Rfl:      verapamil (CALAN) 80 MG tablet, TAKE 1 TABLET (80 MG TOTAL) BY MOUTH 2 (TWO) TIMES A DAY., Disp: 180 tablet, Rfl: 0    Lab:  Last UDS on 1/2019 had expected results. Any abnormal results have been discussed with the patient and may change the plan of care. Please see the scanned document from the outside lab under the media tab. This was reviewed with the patient.      Imaging:  No new imaging.      Recent   Dated 12/5/2019 was reviewed with the patient today.     Paper copy reviewed       Assessment:   Josy Valdez is a 54 y.o. female seen in clinic today for   Chief Complaint   Patient presents with     Follow-up   .  They are here for follow up and continued medical management of their pain. Today we reviewed the lab work of the UDT test and the results are expected because of the prescribed narcotics found in the urine drug screen.     I have also reviewed the information obtained from the last visit  encounter after the DANITA was signed and have asked any pertintent questions needed from other healthcare providers/family/patient to continue care and formulate a treatment plan    Discussed food such as gluten that is likely contributing to her increase in pain and increase in headaches as she notes that when she was avoiding this her pain was decreased on the same regimen.       1. Over the past 6 months has this patient s use of medical cannabis assisted in reducing dosage or eliminating other medications used for pain? It has reduced opioids by 80%- norco- continues to do well with this regimen     2. On a scale from 1 (no benefit) to 7 (great deal of benefit), how much benefit has the patient experienced from taking medical cannabis? 5      3. What benefit(s) has the patient experienced as a result of taking medical cannabis? Please list benefits in order of importance (starting with most important benefit) to the patient.  Reduced pain, more activity, IBS has improved and less intense headaches.      4. How much negative impact, if any, do you believe the patient has experienced by taking medical cannabis? (Please exclude cost from your consideration, as patients will receive a separate survey which specifically asks about cost)   (1- no negative effects; 7- a great deal of negative effects) 2     5. What negative effect(s) has the patient experienced as a result of taking medical cannabis? Please list negative effects in order of importance (starting with most important negative effect) to the patient. drowsiness     6. Has the patient experienced any of the following negative effects:  a. Physical side effects related to medical cannabis use (stomach upset, fatigue, headache, blurred vision, etc.)? None      b. Mental/cognitive side effects related to medical cannabis use (mental clouding, confusion, depression, etc.)? none     c. Worsening of symptoms related to the condition being treated? none     d.  Difficulty/inconvenience in accessing medical cannabis? Wish there was more dispensaries     e. Other negative effect(s)?- none     7. Do you have additional clinical observations regarding medical cannabis use in this patient?  none     8. Do you have any suggestions to improve the program based on your experience, or any requests for additional information about the program? No fee would be nice       Patients current MME is 40  Patient to call clinic for next month's prescription 5 days in advance. Based on the patients response to their previous narcotic therapy and quality of life, which includes their participation in ADLs, I recommend that the narcotics therapy continue, however the changes listed below will be incorporated into their plan of care.     Patient set goals to   1. To reduce her pain     Plan:   Interventions-    Follow up in prn    Does not need to have a rectification today for medical cannabis as this was last done on 6/6/2019.     Will discuss with her provider Jessica FREEDMAN to assume responsibility for management of the medical cannabis.     Patient agrees with the plan of care. Also notes she will avoid wheat based products.     SAFETY REMINDERS  No alcohol while taking controlled substances. Alcohol is not an illegal substance, it is unsafe to use in combination. It is a build up of substances in the body that can be extremely hazardous and may cause respirations to slow to a dangerous rate resulting in hospitalization, brain damage, or death.      Tricia Bro, [facility]  [address] [address]  [place] [address]  Ph: [phone]  Fax: [phone]

## 2021-06-04 NOTE — PATIENT INSTRUCTIONS - HE
Plan:   Plan of Care / NextSteps:      Follow up by: 2/25/20       Education:  We are now titled [facility].     Please call Monday-Friday for problems or questions and one of the clinical support staff (CSS) will help to get things figured out. The number is [phone].      [employer] is a means to send an e-mail ([employer] message) to communicate any concerns.      Please remember some issues require an office visit.      Today we reviewed the plan of care and answered questions.       Records: Reviewed to assist with preparation for the office visit and are reflected throughout the note.     Primary Care: You need to have an annual physical and check in with your primary care folks on a regular basis. Talk with your primary care about the frequency of expected visits.      Medication prescribed / to be continued: tizanidine, hydrocodone, toradol, verapamil, medical cannabis  Medication prescribed today:     Requested Prescriptions             Signed Prescriptions Disp Refills     HYDROcodone-acetaminophen (NORCO )  mg per tablet Please fill 12/31-1/28 - chronic pain 40 tablet 0       Sig: Take 1 tablet by mouth every 4 (four) hours as needed for pain (max of 4/day and 40/28days).     verapamil (CALAN) 80 MG tablet 180 tablet 0       Sig: Take 1 tablet (80 mg total) by mouth 2 (two) times a day.     TiZANidine (ZANAFLEX) 4 MG capsule 270 capsule 0       Sig: Take 3 capsules (12 mg total) by mouth at bedtime as needed for muscle spasms.     HYDROcodone-acetaminophen (NORCO )  mg per tablet Please fill -1/28-2/25 - chronic pain 40 tablet 0       Sig: Take 1 tablet by mouth every 4 (four) hours as needed for pain (max of 4/day and 40/28days).            REFILL INSTRUCTIONS:   Please contact your pharmacy and talk with your pharmacist for any refills of controlled substances. It will be beneficial to know the name of your medication, the date it was written (  12/31/2019 ) and the date you are able to fill. Please remember the date filled and the date started in some cases are different. Please remember to use your medication during the dates of use.

## 2021-06-04 NOTE — PROGRESS NOTES
Patient presents to the clinic today for a follow up with Lola Talavera CNP regarding head and neck pain with migraine. Patient describes their pain as aches. Her/His function score is 6.

## 2021-06-04 NOTE — PROGRESS NOTES
Subjective:   Josy Valdez is a 54 y.o. female who presents to [facility] for evaluation of pain. Reviewed the rooming evaluation. Patient was last seen 11/5/19 reviewed record.     CC: Pain. Review of current status.     Major issues:  1. Chronic migraine without aura with status migrainosus, not intractable    2. Intractable chronic migraine without aura and with status migrainosus    3. Nausea    4. Intractable chronic migraine without aura and without status migrainosus      Patient Active Problem List   Diagnosis     Migraine     Myalgia     Plantar fasciitis, bilateral     Hip pain, chronic, left     Lumbago       HPI: Questionnaires and records reviewed with the patient today.    Location/Laterality of the pain: neck pain,  Head - center of the head and back of the skull  Severity: Today: 4   Since last visit pain scores: at best 3. at worst 8. on average 4  Quality: constant  Timing: constant  Aggravating factors: lack of sleep, stress  Alleviating factors: sleep, medication, medicine  Any New pain, injuries, falls: no  Since last visit pain has: not changed  Associated symptoms:    Numbness: -   Weakness: -   Bladder or Bowel loss of control: -   Night pain: +   Fever and/or Chills: -   Unexplained weight loss: -    Headache: stable - migraine 1 x per week. She has the chronic daily HA biut this are not as much of a barrier.   Frequency of HA: daily    Duration of HA: all day, constant   Quality of HA: pulsing, throbbing    Location of HA: suboccipital w/ migraine then moves to the center of her head    Severity of HA: 4/10  Aura description: peripheral twinkling; black dots with the migraine, dizziness (has not bother her for some time)  #of HA days per mo: daily baseline HA  Rehabilitation: She has completed Craniosacral therapy and we will monitor for any changes over time. Hx of PT. Self massage  Interventional: TPI; Botox injection (did not offer impact and is not  considered to be a option)  Prophylactic treatment: verapmil , medical cannabis  Abortive Medication for HA: not a candidate r/t the CVA, medical cannabis  Treated: Norco and Toradol (assistive), Dark room, sleep, medical cannabis  Associated Mainfestiation of the migraine: Photophobia, watery eyes  Not tried acupuncture afraid of needles, not interested in chiropractic     Functional Symptoms: Pain interferes with:  Sleep: +  Walking:    Ambulation/Transfer: Pt is ambulatory. Transfers independently.  ADL's: no complaints  Concentration: no concerns with concentration.   Transportation: no concerns - she avoids use od the medical cannabis if she will be driving.   Relationships/Social: Patient is engaged with family and friends in a meaningful fashion.     Impact of pain treatments:   Patient reports function has improved with current pain treatment: yes    Mood related to pain:   Depressed: -   Angry: -   Frustrated: -   Anxious: +   Helpless/Hopeless: -    Pertinent Medical Hx/Safety:   Blood thinners: -   Pregnant or wanting to become pregnant: -   New diagnostics since last visit: -   ED/UC visit since last visit: -   New treatment or New medical condition: -    Pain Plan of Care Review:   Medication:   Last opioid dose was Hydrocodone last taken on 12/31/19 @ 8am (took half)    Medication changes: no  Medication side/adverse effects: no  Aberrant behavior: no      Current Outpatient Medications:      aspirin 81 mg chewable tablet, Chew 81 mg daily., Disp: , Rfl:      atorvastatin (LIPITOR) 20 MG tablet, Take 20 mg by mouth., Disp: , Rfl:      azelastine (ASTEPRO) 0.15 % (205.5 mcg) Spry nasal spray, Apply 205.5 mcg into each nostril 2 (two) times a day., Disp: , Rfl:      CHOLECALCIFEROL, VITAMIN D3, (VITAMIN D3 ORAL), Take by mouth., Disp: , Rfl:      DOCOSAHEXANOIC ACID/EPA (FISH OIL ORAL), Take by mouth., Disp: , Rfl:      fluoxetine HCl (PROZAC ORAL), Take 60 mg by mouth., Disp: , Rfl:      GARLIC ORAL,  Take by mouth., Disp: , Rfl:      HYDROcodone-acetaminophen (NORCO )  mg per tablet, Take 1 tablet by mouth every 4 (four) hours as needed for pain (max of 4/day and 40/28days)., Disp: 40 tablet, Rfl: 0 - continued     HYDROcodone-acetaminophen (NORCO )  mg per tablet, Take 1 tablet by mouth every 4 (four) hours as needed for pain (max of 4/day and 40/28days)., Disp: 40 tablet, Rfl: 0     ketorolac (TORADOL) 10 mg tablet, Take 1 tablet (10 mg total) by mouth 2 (two) times a day as needed for pain (max of 10/28 days)., Disp: 10 tablet, Rfl: 2 - not using not needing a script at this time     L.ACID/L.CASEI/B.BIF/B.ERIC/FOS (PROBIOTIC BLEND ORAL), Take by mouth., Disp: , Rfl:      losartan-hydrochlorothiazide (HYZAAR) 50-12.5 mg per tablet, Take 1 tablet by mouth., Disp: , Rfl:      melatonin 3 mg TbER, Take 6 mg by mouth., Disp: , Rfl:      multivitamin capsule, Take 1 capsule by mouth daily., Disp: , Rfl:      omeprazole (PRILOSEC) 20 MG capsule, TAKE 1 CAPSULE BY MOUTH EVERY DAY, Disp: 30 capsule, Rfl: 1     polyethylene glycol (GLYCOLAX) 17 gram/dose powder, , Disp: , Rfl:      prochlorperazine (COMPAZINE) 10 MG tablet, Take 1 tablet (10 mg total) by mouth every 6 (six) hours as needed for nausea., Disp: 10 tablet, Rfl: 2 - not needed at this time continued as needed     TiZANidine (ZANAFLEX) 4 MG capsule, Take 3 capsules (12 mg total) by mouth at bedtime as needed for muscle spasms., Disp: 270 capsule, Rfl: 0 - continued     UNABLE TO FIND, Medical Cannabis, Disp: , Rfl: -continued     verapamil (CALAN) 80 MG tablet, TAKE 1 TABLET (80 MG TOTAL) BY MOUTH 2 (TWO) TIMES A DAY., Disp: 180 tablet, Rfl: 0 - continued    Rehabilitation:  Home exercise program:She has been walking on a treadmil    Consultation/Specialist:   12/5/19 Dieudonne - review and re certification - pt will come to harshad with next change    Review of Systems   Musculoskeletal- + pain  Neuro- - cognitive changes  HEENT: +  "HA  GI: IBS - good with the medical cannabis  Psych-  + mood concerns (stable),  - taking medication in a fashion other than prescribed, loss and grief    Social  No family history on file.  Social History     Tobacco Use   Smoking Status Former Smoker   Smokeless Tobacco Never Used     Social History     Substance and Sexual Activity   Alcohol Use No    Comment: in recovery     Social History     Substance and Sexual Activity   Drug Use No     Social History     Substance and Sexual Activity   Sexual Activity Not on file       Objective:     Vitals:    12/31/19 1123   BP: 142/89   Pulse: 82   Resp: 18   Weight: 205 lb (93 kg)   Height: 5' 5\" (1.651 m)   PainSc:   4       Constitutional:  Pleasant and cooperative female who presents alone today. She is smiling and wearing make up   Psychiatric: Mood and affect are appropriate for the situation, setting and topic of discussion.  Patient does not appear sedated.  Integumentary:  Observed skin WNL.  HEENT: EOM's grossly intact.    Chest: Breathing is non-labored.   Neurological:  Alert and oriented in all spheres including: time, place, person and situation.      Diagnostics:   Lab:  Notes recorded by Lola Talavera CNP on 1/25/2019 at 4:31 PM CST  Reviewed note 1/22/19 Last opioid dose was Hydrocodone ast dose- 01/22/2019 @ 700am. Noted pt is on the medical cannabis program  UDT:  Detected Marijuana metabolite (expected); Detected hydrocodone and metabolite (expected)      :  Dated 12/31/2019 reviewed to aid with decision regarding medication management       Assessment: Dated 11/12/2019 ANJELICA Score: 13   Assessment: Dated 7/16/2019 ANJELICA Score: 16  Assessment: Dated 3/22/2019 ANJELICA Score: 16    Assessment:   Josy Valdez is a 54 y.o. female seen in clinic today for migraine HA. Due to CVA she is not a candidate for triptans. Failed Botox. She has completed craniosacral therapy. She is doing a HEP from PT.   She started the medical cannabis program this has been " going well. At this time she is not interested in the CGRP therapy as she feels stable.       *Universal Precautions:    UDT/Swab- 01/22/2019  Consent-11/23/18  Agreement- 11/23/8  Pharmacy- as documented    Count- #4 remaining hydrocodone at appt 7/17/15   Psychological evaluation: DA 9/18/15  MME- 40  1/22/19 MME=10  03/21/2019 MME- 10   05/23/2019 MME- 10  Pharmacogenetic testing- n/a  MTM: n/a.    Management of opioid medication is inherently a moderate to high complex medial interaction based on the risk management required at each contact r/t risks and side effects.    Plan:   Plan of Care / NextSteps:     Follow up by: 2/25/20      Education:  We are now titled [facility].    Please call Monday-Friday for problems or questions and one of the clinical support staff (CSS) will help to get things figured out. The number is [phone].     [employer] is a means to send an e-mail ([employer] message) to communicate any concerns.     Please remember some issues require an office visit.     Today we reviewed the plan of care and answered questions.      Records: Reviewed to assist with preparation for the office visit and are reflected throughout the note.    Primary Care: You need to have an annual physical and check in with your primary care folks on a regular basis. Talk with your primary care about the frequency of expected visits.     Medication prescribed / to be continued: tizanidine, hydrocodone, toradol, verapamil, medical cannabis  Medication prescribed today:    Requested Prescriptions     Signed Prescriptions Disp Refills     HYDROcodone-acetaminophen (NORCO )  mg per tablet Please fill 12/31-1/28 - chronic pain 40 tablet 0     Sig: Take 1 tablet by mouth every 4 (four) hours as needed for pain (max of 4/day and 40/28days).     verapamil (CALAN) 80 MG tablet 180 tablet 0     Sig: Take 1 tablet (80 mg total) by mouth 2 (two) times a day.     TiZANidine (ZANAFLEX) 4  MG capsule 270 capsule 0     Sig: Take 3 capsules (12 mg total) by mouth at bedtime as needed for muscle spasms.     HYDROcodone-acetaminophen (NORCO )  mg per tablet Please fill -1/28-2/25 - chronic pain 40 tablet 0     Sig: Take 1 tablet by mouth every 4 (four) hours as needed for pain (max of 4/day and 40/28days).         REFILL INSTRUCTIONS:   Please contact your pharmacy and talk with your pharmacist for any refills of controlled substances. It will be beneficial to know the name of your medication, the date it was written ( 12/31/2019 ) and the date you are able to fill. Please remember the date filled and the date started in some cases are different. Please remember to use your medication during the dates of use.      Patient Arrived @ 1050 for a 1120 appointment.     TT: 1263 - 2468  CT: over half spent in education and counseling reviewing status and plan per HPI    Lola Talavera APRN FNP-BC  [address] [address]   [phone]  [phone]

## 2021-06-04 NOTE — PATIENT INSTRUCTIONS - HE
Plan:   Interventions-    Follow up in prn    Does not need to have a rectification today for medical cannabis as this was last done on 6/6/2019.     Will discuss with her provider Jessica FREEDMAN to assume responsibility for management of the medical cannabis.     SAFETY REMINDERS  No alcohol while taking controlled substances. Alcohol is not an illegal substance, it is unsafe to use in combination. It is a build up of substances in the body that can be extremely hazardous and may cause respirations to slow to a dangerous rate resulting in hospitalization, brain damage, or death.      Tricia Bro, [facility]  [address]  [place] [address]  Ph: [phone]  Fax: [phone]

## 2021-06-04 NOTE — PROGRESS NOTES
In clinic today for medical cannibus review F=5  Presets with back pain and head pain which is constant sharp.

## 2021-06-05 VITALS — HEIGHT: 65 IN | WEIGHT: 205 LBS | BODY MASS INDEX: 34.16 KG/M2

## 2021-06-06 NOTE — TELEPHONE ENCOUNTER
Medication being requested: verapamil  Patient can address this refill at upcoming office visit scheduled: 4/15

## 2021-06-06 NOTE — PROGRESS NOTES
Patient presents to the clinic today for a follow up with Lola Talavera CNP regarding head and neck pain. Patient describes their pain as ache. Her/His function score is 6.

## 2021-06-06 NOTE — PATIENT INSTRUCTIONS - HE
Plan:   Plan of Care / NextSteps:     Follow up by: 4/20/20    Education:  We are now titled [facility].    Please call Monday-Friday for problems or questions and one of the clinical support staff (CSS) will help to get things figured out. The number is [phone].     [employer] is a means to send an e-mail ([employer] message) to communicate any concerns.     Please remember some issues require an office visit.     Today we reviewed the plan of care and answered questions.      Records: Reviewed to assist with preparation for the office visit and are reflected throughout the note.    Primary Care: You need to have an annual physical and check in with your primary care folks on a regular basis. Talk with your primary care about the frequency of expected visits.     Rehabilitation: continue home exercises    Diagnostics:   UDT/SWAB collected 2/21/20 results are pending.  UDT/SWAB:  Patient required a random Urine Drug Testing, due to the need to comply with [place] Model Policy Guidelines and CDC Guideline for the use of any controlled substances. This is to ensure that patient is compliant with treatment, and monitor for risks such as diversion, abuse, or any other aberrant behaviors. Patient is either being considered for or taking a controlled substance. Unexpected findings will be discussed and treatment decision may be adjusted. Testing is being implemented across the board randomly w/o bias related to age, race, gender, socioeconomic status or Hinduism affiliation.       Medication prescribed / to be continued:   Medication prescribed today:    Requested Prescriptions     Signed Prescriptions Disp Refills     HYDROcodone-acetaminophen (NORCO )  mg per tablet Please fill -2/25-3/24  - chronic pain 40 tablet 0     Sig: Take 1 tablet by mouth every 4 (four) hours as needed for pain (max of 4/day and 40/28days).     TiZANidine (ZANAFLEX) 4 MG capsule 270 capsule 0      Sig: Take 3 capsules (12 mg total) by mouth at bedtime as needed for muscle spasms.         REFILL INSTRUCTIONS: Chose a single means of communication about medication refills.     Check [employer] to confirm prescriptions have been addressed.    Please contact the clinic 3-7 working days before your refill is due. By 3/17/20    Call [phone] leave: If calling speak clearly; note cell phones cut in-and-out and poor quality speech and reception issues will influence our ability to hear you and be efficient with your prescription.   Your name (first and last w/ spelling)   Date of birth  Name of all the medication(s) being requested  Dose of the medication(s)   How you are taking the medication (eg. twice per day etc).     Contact your pharmacy and talk with your pharmacist about any government Controlled/Scheduled medications 3 (three) days after leaving your message to see if your prescription has been received. Please request the pharmacist check your profile to be certain about any concerns with a script failing to be received.   If the script has not been received there may have been a problem with the communication please reach back out to the clinic.

## 2021-06-06 NOTE — PROGRESS NOTES
Subjective:   Josy Valdez is a 54 y.o. female who presents to [facility] for evaluation of pain. Reviewed the rooming evaluation. Patient was last seen 12/31/19 reviewed record.     CC: Pain. Review of current status.     Major issues:  1. Chronic pain syndrome    2. Chronic migraine without aura with status migrainosus, not intractable    3. Intractable chronic migraine without aura and with status migrainosus    4. Nausea    5. Intractable chronic migraine without aura and without status migrainosus      Patient Active Problem List   Diagnosis     Migraine     Myalgia     Plantar fasciitis, bilateral     Hip pain, chronic, left     Lumbago       HPI: Questionnaires and records reviewed with the patient today.    Location/Laterality of the pain: neck pain,  Head - center of the head and back of the skull  Severity: Today: 4   Since last visit pain scores: at best 3. at worst 8. on average 4    Headache: stable - migraine 1 x per week. She has the chronic daily HA biut this are not as much of a barrier.   Frequency of HA: daily    Duration of HA: all day, constant, migraines will last 2-3 days  Quality of HA: pulsing, throbbing    Location of HA: suboccipital w/ migraine then moves to the center of her head    Severity of HA: 4/10  Aura description: peripheral twinkling; black dots with the migraine, dizziness (has not bother her for some time)  #of HA days per mo: daily baseline HA  Rehabilitation: She has completed Craniosacral therapy and we will monitor for any changes over time. Hx of PT. Self massage  Interventional: TPI; Botox injection (did not offer impact and is not considered to be a option)  Prophylactic treatment: verapmil , medical cannabis  Abortive Medication for HA: not a candidate r/t the CVA, medical cannabis  Treated: Norco and Toradol (assistive), Dark room, sleep, medical cannabis  Associated Mainfestiation of the migraine: Photophobia, watery eyes  Not  tried acupuncture afraid of needles, not interested in chiropractic        Functional Symptoms: Pain interferes with:  Sleep:she ayoub not sleep well and this is unchanged from her usual  Walking:    Ambulation/Transfer: Pt is ambulatory. Transfers independently.  ADL's: indpendent   Bathing:    Dressing:    Cooking:    Shopping:    Housekeeping: - she re-did her bathroom by herself   Toileting:   Concentration: she has been doing well   Relationships/Social: Patient is engaged with family and friends in a meaningful fashion. She does not like winter very much.     Impact of pain treatments:   Patient reports function has improved with current pain treatment: yes    Mood related to pain: she feels like she is doing pretty well.    Depressed: -   Angry: -   Frustrated: -   Anxious: +   Helpless/Hopeless: -    Pertinent Medical Hx/Safety:   Blood thinners: -   Pregnant or wanting to become pregnant: -   New diagnostics since last visit: -   ED/UC visit since last visit: -   New treatment or New medical condition: -    Pain Plan of Care Review:   Medication:   Last opioid dose was Hydrocodone last taken on 2/21/20 @ 10am, medical cannabis 2/20/20    Medication changes: no  Medication side/adverse effects: no  Aberrant behavior: no      Current Outpatient Medications:      aspirin 81 mg chewable tablet, Chew 81 mg daily., Disp: , Rfl:      atorvastatin (LIPITOR) 20 MG tablet, Take 20 mg by mouth., Disp: , Rfl:      azelastine (ASTEPRO) 0.15 % (205.5 mcg) Spry nasal spray, Apply 205.5 mcg into each nostril 2 (two) times a day., Disp: , Rfl:      CHOLECALCIFEROL, VITAMIN D3, (VITAMIN D3 ORAL), Take by mouth., Disp: , Rfl:      DOCOSAHEXANOIC ACID/EPA (FISH OIL ORAL), Take by mouth., Disp: , Rfl:      fluoxetine HCl (PROZAC ORAL), Take 60 mg by mouth., Disp: , Rfl:      GARLIC ORAL, Take by mouth., Disp: , Rfl:      HYDROcodone-acetaminophen (NORCO )  mg per tablet, Take 1 tablet by mouth every 4 (four) hours as  needed for pain (max of 4/day and 40/28days)., Disp: 40 tablet, Rfl: 0 - continued     HYDROcodone-acetaminophen (NORCO )  mg per tablet, Take 1 tablet by mouth every 4 (four) hours as needed for pain (max of 4/day and 40/28days)., Disp: 40 tablet, Rfl: 0     ketorolac (TORADOL) 10 mg tablet, Take 1 tablet (10 mg total) by mouth 2 (two) times a day as needed for pain (max of 10/28 days)., Disp: 10 tablet, Rfl: 2 - not using often no refill needed today     L.ACID/L.CASEI/B.BIF/B.ERIC/FOS (PROBIOTIC BLEND ORAL), Take by mouth., Disp: , Rfl:      losartan-hydrochlorothiazide (HYZAAR) 50-12.5 mg per tablet, Take 1 tablet by mouth., Disp: , Rfl:      melatonin 3 mg TbER, Take 6 mg by mouth., Disp: , Rfl:      multivitamin capsule, Take 1 capsule by mouth daily., Disp: , Rfl:      omeprazole (PRILOSEC) 20 MG capsule, TAKE 1 CAPSULE BY MOUTH EVERY DAY, Disp: 30 capsule, Rfl: 1     polyethylene glycol (GLYCOLAX) 17 gram/dose powder, , Disp: , Rfl:      prochlorperazine (COMPAZINE) 10 MG tablet, Take 1 tablet (10 mg total) by mouth every 6 (six) hours as needed for nausea., Disp: 10 tablet, Rfl: 2 - continued no refill needed today     TiZANidine (ZANAFLEX) 4 MG capsule, Take 3 capsules (12 mg total) by mouth at bedtime as needed for muscle spasms., Disp: 270 capsule, Rfl: 0 - continued     UNABLE TO FIND, Medical Cannabis, Disp: , Rfl:  - continued     verapamil (CALAN) 80 MG tablet, Take 1 tablet (80 mg total) by mouth 2 (two) times a day., Disp: 180 tablet, Rfl: 0 - continued      Medical Cannabis:  Qualifying Condition: Chronic Intractable Pain  Qualifying Provider: Dieudonne  [facility]    Benefit   List  Benefit(s): improved - relaxation, sleep, anxiety, pain mitigation and IBS, nausea    Negative   List negative effect(s): coughing occasionally, tired  Specific negative effect(s)  Physical side effects: stomach upset, fatigue, headache, blurred vision none  Mental/cognitive side effects: mental  "clouding, confusion, depression no concerns  Worsening of symptoms related to the condition being treated none    Program improvements or additional information less expensive, increased local access points      Rehabilitation:   Home exercise program: playing with the dog and walking    Review of Systems   Musculoskeletal- + pain  Neuro- - cognitive changes  HEENT: + HA  GI: IBS - good with the medical cannabis  Psych-  + mood concerns (stable),  - taking medication in a fashion other than prescribed, loss and grief    Social  No family history on file.  Social History     Tobacco Use   Smoking Status Former Smoker   Smokeless Tobacco Never Used     Social History     Substance and Sexual Activity   Alcohol Use No    Comment: in recovery     Social History     Substance and Sexual Activity   Drug Use No     Social History     Substance and Sexual Activity   Sexual Activity Not on file       Objective:     Vitals:    02/21/20 1058   BP: 135/82   Pulse: 83   Resp: 18   Weight: 212 lb (96.2 kg)   Height: 5' 5\" (1.651 m)   PainSc:   4       Constitutional:  Pleasant and cooperative female who presents alone today. She is wearing make up and looks good. She is smiling.  Psychiatric: Mood and affect are appropriate for the situation, setting and topic of discussion.  Patient does not appear sedated.  Integumentary:  Observed skin WNL.  HEENT: EOM's grossly intact.    Chest: Breathing is non-labored.   Neurological:  Alert and oriented in all spheres including: time, place, person and situation.    Diagnostics:   Lab:  Notes recorded by Lola Talavera CNP on 1/25/2019 at 4:31 PM CST  Reviewed note 1/22/19 Last opioid dose was Hydrocodone ast dose- 01/22/2019 @ 700am. Noted pt is on the medical cannabis program  UDT:  Detected Marijuana metabolite (expected); Detected hydrocodone and metabolite (expected)      :  Dated 2/21/2020 reviewed to aid with decision regarding medication " management    Assessment: Dated 11/12/2019 ANJELICA Score: 13   Assessment: Dated 7/16/2019 ANJELICA Score: 16  Assessment: Dated 3/22/2019 ANJELICA Score: 16    Assessment:   Josy Valdez is a 54 y.o. female seen in clinic today for migraine HA. Due to CVA she is not a candidate for triptans. Failed Botox. She has completed craniosacral therapy. She is doing a HEP from PT.   She started the medical cannabis program this has been going well. At this time she is not interested in the CGRP therapy as she feels stable.       She has done exceptionally well with the current combination of medication.    *Universal Precautions:    UDT/Swab- 01/22/2019  Consent-11/23/18  Agreement- 11/23/8  Pharmacy- as documented    Count- #4 remaining hydrocodone at appt 7/17/15   Psychological evaluation: DA 9/18/15  MME- 40  1/22/19 MME=10  03/21/2019 MME- 10   05/23/2019 MME- 10  Pharmacogenetic testing- n/a  MTM: n/a.  Medical cannabis+    Management of opioid medication is inherently a moderate to high complex medial interaction based on the risk management required at each contact r/t risks and side effects.    Plan:   Plan of Care / NextSteps:     Follow up by: 4/20/20    Education:  We are now titled [facility].    Please call Monday-Friday for problems or questions and one of the clinical support staff (CSS) will help to get things figured out. The number is [phone].     [employer] is a means to send an e-mail (SMIC message) to communicate any concerns.     Please remember some issues require an office visit.     Today we reviewed the plan of care and answered questions.      Records: Reviewed to assist with preparation for the office visit and are reflected throughout the note.    Primary Care: You need to have an annual physical and check in with your primary care folks on a regular basis. Talk with your primary care about the frequency of expected visits.     Rehabilitation: continue home  exercises    Diagnostics:   UDT/SWAB collected 2/21/20 results are pending.  UDT/SWAB:  Patient required a random Urine Drug Testing, due to the need to comply with [place] Model Policy Guidelines and CDC Guideline for the use of any controlled substances. This is to ensure that patient is compliant with treatment, and monitor for risks such as diversion, abuse, or any other aberrant behaviors. Patient is either being considered for or taking a controlled substance. Unexpected findings will be discussed and treatment decision may be adjusted. Testing is being implemented across the board randomly w/o bias related to age, race, gender, socioeconomic status or Confucianism affiliation.       Medication prescribed / to be continued:   Medication prescribed today:    Requested Prescriptions     Signed Prescriptions Disp Refills     HYDROcodone-acetaminophen (NORCO )  mg per tablet Please fill -2/25-3/24  - chronic pain 40 tablet 0     Sig: Take 1 tablet by mouth every 4 (four) hours as needed for pain (max of 4/day and 40/28days).     TiZANidine (ZANAFLEX) 4 MG capsule 270 capsule 0     Sig: Take 3 capsules (12 mg total) by mouth at bedtime as needed for muscle spasms.         REFILL INSTRUCTIONS: Chose a single means of communication about medication refills.     Check mychart to confirm prescriptions have been addressed.    Please contact the clinic 3-7 working days before your refill is due. By 3/17/20    Call [phone] leave: If calling speak clearly; note cell phones cut in-and-out and poor quality speech and reception issues will influence our ability to hear you and be efficient with your prescription.   Your name (first and last w/ spelling)   Date of birth  Name of all the medication(s) being requested  Dose of the medication(s)   How you are taking the medication (eg. twice per day etc).     Contact your pharmacy and talk with your pharmacist about any government Controlled/Scheduled medications  3 (three) days after leaving your message to see if your prescription has been received. Please request the pharmacist check your profile to be certain about any concerns with a script failing to be received.   If the script has not been received there may have been a problem with the communication please reach back out to the clinic.        Patient Arrived @ 1043 for a 1120 appointment.     TT: [phone]  CT: over half spent in education and counseling reviewing status and plan per HPI    Lola Talavera APRN FNP-BC  [address] [facility] [address]   [phone]  [phone]

## 2021-06-07 NOTE — PROGRESS NOTES
Josy Valdez is a 55 y.o. female is being evaluated via a telephone visit. Patient was last seen 2/21/20 reviewed record.       Confirmed understanding the telephone visit is billable.    Start Time: 1008  End Time: 1022    Major issues:  1. Intractable chronic migraine without aura and with status migrainosus    2. Nausea    3. Intractable chronic migraine without aura and without status migrainosus    4. Chronic migraine without aura with status migrainosus, not intractable      Patient Active Problem List   Diagnosis     Migraine     Myalgia     Plantar fasciitis, bilateral     Hip pain, chronic, left     Lumbago       HPI:    Location/Laterality of the pain: neck pain,  Head - center of the head and back of the skull    Headache: She is having more HA she is reporting more stress HA  Frequency of HA: daily    Duration of HA: all day, constant, migraines will last 2-3 days  Quality of HA: pulsing, throbbing    Location of HA: suboccipital w/ migraine then moves to the center of her head    Severity of HA: 4/10  Aura description: peripheral twinkling; black dots with the migraine, dizziness (has not bother her for some time)  #of HA days per mo: daily baseline HA  Rehabilitation: She has completed Craniosacral therapy and we will monitor for any changes over time. Hx of PT. Self massage  Interventional: TPI; Botox injection (did not offer impact and is not considered to be a option)  Prophylactic treatment: verapmil , medical cannabis  Abortive Medication for HA: not a candidate r/t the CVA, medical cannabis  Treated: Norco and Toradol (assistive), Dark room, sleep, medical cannabis  Associated Mainfestiation of the migraine: Photophobia, watery eyes  Not tried acupuncture afraid of needles, not interested in chiropractic     Any New pain, injuries, falls: none  Since last visit pain has: stable  Associated symptoms: sick last month for some time. They thought they had a GI virus. Not certain if this could have  been COVID. They have been remaining home    Functional Symptoms: Pain interferes with:  Sleep: +  Walking:    Ambulation/Transfer: Pt is ambulatory. Transfers independently.  Work:     Animal Care: dog - she  ADL's:    Bathing: independent and the same   Dressing: independent and the same   Cooking:she has been cooking - a lot more. She likes baking more than cooking   Shopping: ordering groceries and needs in   Housekeeping: she is getting things done   Toileting: diarrhea last month that lasted about 2 weeks, independent  Concentration: no issues  Relationships/Social: Engaging in physical distancing. Reports feeling socially connected. Her  has been laid off.     Impact of pain treatments:   Patient reports function has improved with current pain treatment: she is getting benefit      Pain Plan of Care Review:   Medication:   Last opioid dose was Hydrocodone last taken on 4/15/20 @ 4am    Medication changes: no  Medication side/adverse effects: no  Aberrant behavior: no      Current Outpatient Medications:      aspirin 81 mg chewable tablet, Chew 81 mg daily., Disp: , Rfl:      atorvastatin (LIPITOR) 20 MG tablet, Take 20 mg by mouth., Disp: , Rfl:      azelastine (ASTEPRO) 0.15 % (205.5 mcg) Spry nasal spray, Apply 205.5 mcg into each nostril 2 (two) times a day., Disp: , Rfl:      CHOLECALCIFEROL, VITAMIN D3, (VITAMIN D3 ORAL), Take by mouth., Disp: , Rfl:      DOCOSAHEXANOIC ACID/EPA (FISH OIL ORAL), Take by mouth., Disp: , Rfl:      fluoxetine HCl (PROZAC ORAL), Take 60 mg by mouth., Disp: , Rfl:      GARLIC ORAL, Take by mouth., Disp: , Rfl:      hydroCHLOROthiazide (HYDRODIURIL) 12.5 MG tablet, Take 12.5 mg by mouth daily., Disp: , Rfl:      HYDROcodone-acetaminophen (NORCO )  mg per tablet, Take 1 tablet by mouth every 4 (four) hours as needed for pain (max of 4/day and 40/28days)., Disp: 40 tablet, Rfl: 0 - continued     HYDROcodone-acetaminophen (NORCO )  mg per  tablet, Take 1 tablet by mouth every 4 (four) hours as needed for pain (max of 4/day and 40/28days)., Disp: 40 tablet, Rfl: 0 - she did not fill the last script she was not able to leave the house.     ketorolac (TORADOL) 10 mg tablet, Take 1 tablet (10 mg total) by mouth 2 (two) times a day as needed for pain (max of 10/28 days)., Disp: 10 tablet, Rfl: 2 - rare use     L.ACID/L.CASEI/B.BIF/B.ERIC/FOS (PROBIOTIC BLEND ORAL), Take by mouth., Disp: , Rfl:      losartan (COZAAR) 50 MG tablet, Take 100 mg by mouth daily., Disp: , Rfl:      losartan-hydrochlorothiazide (HYZAAR) 50-12.5 mg per tablet, Take 1 tablet by mouth., Disp: , Rfl:      melatonin 3 mg TbER, Take 6 mg by mouth., Disp: , Rfl:      multivitamin capsule, Take 1 capsule by mouth daily., Disp: , Rfl:      omeprazole (PRILOSEC) 20 MG capsule, TAKE 1 CAPSULE BY MOUTH EVERY DAY, Disp: 30 capsule, Rfl: 1     pantoprazole (PROTONIX) 20 MG tablet, , Disp: , Rfl:      polyethylene glycol (GLYCOLAX) 17 gram/dose powder, , Disp: , Rfl:      prochlorperazine (COMPAZINE) 10 MG tablet, Take 1 tablet (10 mg total) by mouth every 6 (six) hours as needed for nausea., Disp: 10 tablet, Rfl: 2 - still has medication     [START ON 4/18/2020] TiZANidine (ZANAFLEX) 4 MG capsule, Take 3 capsules (12 mg total) by mouth at bedtime as needed for muscle spasms., Disp: 270 capsule, Rfl: 0 - continued     UNABLE TO FIND, Medical Cannabis, Disp: , Rfl:  - continued     verapamil (CALAN) 80 MG tablet, Take 1 tablet (80 mg total) by mouth 2 (two) times a day., Disp: 180 tablet, Rfl: 0      Medical Cannabis:  Qualifying Condition: Chronic Intractable Pain  Qualifying Provider: Dieudonne  [facility]    Rehabilitation:  Home exercise program: walking the dog, treadmill    Review of Systems   Musculoskeletal- + pain  Neuro- - cognitive changes  HEENT: + HA  GI: IBS - good with the medical cannabis  Psych-  + mood concerns (stable),  - taking medication in a fashion other than  prescribed, loss and grief    Social  No family history on file.  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   Neurological:  Alert and oriented in all spheres including: time, place, person and situation.    Diagnostics:   Lab:  Notes recorded by Lola Talavera CNP on 3/2/2020 at 9:50 AM CST   Reviewed note 2/21/20 Last opioid dose was Hydrocodone last taken on 2/21/20 @ 10am, medical cannabis 2/20/20     UDT:   Detected mariajuana metabolite (on medical cannabis program); Detected hydrocodone and metabolites (expected)     :  Dated 4/15/2020 reviewed to aid with decision regarding medication management    Assessment: Dated 11/12/2019 ANJELICA Score: 13   Assessment: Dated 7/16/2019 ANJELICA Score: 16  Assessment: Dated 3/22/2019 ANJELICA Score: 16  Assessment:   Josy Valdez is a 55 y.o. female . Telephone evaluation for migraine HA. Due to CVA she is not a candidate for triptans. Failed Botox. She has completed craniosacral therapy. She is doing a HEP from PT.   She started the medical cannabis program this has been going well. At this time she is not interested in the CGRP therapy as she feels stable.       She has done exceptionally well with the current combination of medication.     *Universal Precautions:    UDT/Swab- 01/22/2019  Consent-11/23/18  Agreement- 11/23/8  Pharmacy- as documented    Count- #4 remaining hydrocodone at appt 7/17/15   Psychological evaluation: DA 9/18/15  MME- 40  1/22/19 MME=10  03/21/2019 MME- 10   05/23/2019 MME- 10  Pharmacogenetic testing- n/a  MTM: n/a.  Medical  cannabis+        Management of opioid medication is inherently a moderate to high complex medial interaction based on the risk management required at each contact r/t risks and side effects.      Plan:   Thank you for participating in the telephone visit - Here is the Plan of Care / NextSteps:     Follow up by: 8 weeks      Communicating with us:  Please call Monday-Friday for problems or questions - leave a message and one of the clinical support staff (CSS) will help to get things figured out. The number is [phone].     You may also opt to communicate through Sendoid.    Primary Care: Contact your primary care clinic for any health related concerns or for any recommendations. You may also communicate with your primary care clinic for questions; [facility] Hotline phone number is [phone] or [phone]; or you may login to [email or web address] or call [phone].    Rehabilitation: Remain active continue your home exercises and stretches you may also want to consider- Patricia Arellano - You Tube walking in place 20 minute Brisk Walk [email or web address]     Integrative: Meditation - classroom meditation - for all ages Yoga with Naomi - You Tube it is a 6 minute meditation (breathing exercises) - [email or web address]     Social: Please remember to engage in physical distancing. Continue to connect socially with family and friends by telephone communication and virtual engagement.     Medication prescribed / to be continued:   Medication prescribed today:    Requested Prescriptions     Signed Prescriptions Disp Refills     verapamiL (CALAN) 80 MG tablet 180 tablet 0     Sig: Take 1 tablet (80 mg total) by mouth 2 (two) times a day.     HYDROcodone-acetaminophen (NORCO )  mg per tablet Please fill -4/15-5/13  - chronic pain 40 tablet 0     Sig: Take 1 tablet by mouth every 4 (four) hours as needed for pain (max of 4/day and 40/28days).         REFILL  INSTRUCTIONS:     Please contact the clinic 3-7 working days before your refill is due - use either [employer] or the telephone system not both.      Call [phone] leave: If calling speak clearly; note cell phones cut in-and-out and poor quality speech and reception issues will influence our ability to hear you and be efficient with your prescription.   Your name (first and last w/ spelling)   Date of birth  Name of all the medication(s) being requested  Dose of the medication(s)   How you are taking the medication (eg. twice per day etc).     After 3 business days - please contact your pharmacy and talk with your pharmacist about any government Controlled/Scheduled medications.  Please request the pharmacist check your profile to be certain about any concerns with a script failing to be received.   If the script has not been received there may have been a problem with the communication please reach back out to the clinic.     Due to increased volume we will not be able to call you and make you aware of your scripts.        Lola Talavera APRN FNP-BC  [address] [address]   [phone]  [phone]    Phone call duration: 14:02 minutes    Lola Talavera, CNP

## 2021-06-07 NOTE — PATIENT INSTRUCTIONS - HE
Plan:   Thank you for participating in the telephone visit - Here is the Plan of Care / NextSteps:     Follow up by: 8 weeks      Communicating with us:  Please call Monday-Friday for problems or questions - leave a message and one of the clinical support staff (CSS) will help to get things figured out. The number is [phone].     You may also opt to communicate through [employer].    Primary Care: Contact your primary care clinic for any health related concerns or for any recommendations. You may also communicate with your primary care clinic for questions; [facility] Hotline phone number is [phone] or [phone]; or you may login to [email or web address] or call [phone].    Rehabilitation: Remain active continue your home exercises and stretches you may also want to consider- Patricia Arellano - You Tube walking in place 20 minute Brisk Walk [email or web address]     Integrative: Meditation - classroom meditation - for all ages Yoga with Naomi - You Tube it is a 6 minute meditation (breathing exercises) - [email or web address]     Social: Please remember to engage in physical distancing. Continue to connect socially with family and friends by telephone communication and virtual engagement.     Medication prescribed / to be continued:   Medication prescribed today:    Requested Prescriptions     Signed Prescriptions Disp Refills     verapamiL (CALAN) 80 MG tablet 180 tablet 0     Sig: Take 1 tablet (80 mg total) by mouth 2 (two) times a day.     HYDROcodone-acetaminophen (NORCO )  mg per tablet Please fill -4/15-5/13  - chronic pain 40 tablet 0     Sig: Take 1 tablet by mouth every 4 (four) hours as needed for pain (max of 4/day and 40/28days).         REFILL INSTRUCTIONS:     Please contact the clinic 3-7 working days before your refill is due - use either [employer] or the telephone system not both.      Call [phone] leave: If calling speak clearly; note  cell phones cut in-and-out and poor quality speech and reception issues will influence our ability to hear you and be efficient with your prescription.   Your name (first and last w/ spelling)   Date of birth  Name of all the medication(s) being requested  Dose of the medication(s)   How you are taking the medication (eg. twice per day etc).     After 3 business days - please contact your pharmacy and talk with your pharmacist about any government Controlled/Scheduled medications.  Please request the pharmacist check your profile to be certain about any concerns with a script failing to be received.   If the script has not been received there may have been a problem with the communication please reach back out to the clinic.     Due to increased volume we will not be able to call you and make you aware of your scripts.

## 2021-06-08 NOTE — PROGRESS NOTES
Subjective:   Josy Valdez is a 51 y.o. female who presents for evaluation of pain. Patient was last seen 12/8/16.      Major issues:  1. Chronic pain syndrome    2. Chronic migraine without aura with status migrainosus, not intractable        CC: Pain  See rooming evaluation    HPI:   Impact of pain treatments:   Analgesia: fair  ADL's: Household: She is getting more done since the change in the medication.   AE's: none   Aberrant behavior: none    Headache:  She is having times 2-3 (increased) x per week when she is not able to get the HA under control. The toradol in theses situations was assistive.   Frequency of HA: daily    Duration of HA: all day, constant  (can grow during the day)  Quality of HA: pulsing, throbbing    Location of HA: suboccipital w/ migraine then moves to the center of her head    Severity of HA: 4/10  Aura description: peripheral twinkling; black dots with the migraine, dizziness (not bad)  #of HA days per mo: more than 15 per month    Rehabilitation: She has completed Craniosacral therapy and we will monitor for any changes over time.   Interventional: TPI; Botox injection (did not offer impact and is not considered to be a option)  Prophylactic treatment: verapmil    Abortive Medication for HA: not a candidate r/t the CVA    Treated: Norco and Toradol (assistive), Dark room, sleep   Associated Mainfestiation of the migraine: Photophobia, watery eyes      Medication: Patient is taking toradol 10mg BID prn; hydrocodone 10/325mg max of 5 per day helping some. verapmin 80mg 1 po BID, prozac 20mg 2 po qd, ASA 81mg 2 po qd; prochlorperazine 10mg q6prn; Miralax qd; fish oil, magnesium, melatonin.     Last opioid dose was Hydrocodone-acetaminophen last taken 01/09/17 @ 300a.    Past medications tried include prozac (on), verapamil(on), melatonin (on), tramadol (fear of serotonin syndrome), norco (on), fioricet (w/o impact), topamax (halucinations), imitrex (CVA) was assistive.    Denies a hx of  "Botox. MSIR(changed to hydrocodone)      Interventional: TRIGGER POINT INJECTION  Performed on: 12/15/2016   There were injections made in the upper cervical paraspinal muscles near the attachment to the occiput bilaterally. Injections were made in the lower cervical paraspinal muscles on both sides and in the upper trapezius muscles bilaterally. Injections were made in the levator scapula muscles near the attachment to the scapula on both sides.    She reports no impact on the HA    Review of Systems   Neuro- + HA  Psych-  + anxiety,  - taking medication in a fashion other than prescribed    Objective:     Vitals:    01/09/17 1138   BP: 126/80   Pulse: 93   Resp: 16   Weight: 220 lb (99.8 kg)   Height: 5' 5\" (1.651 m)   PainSc:   4     Constitutional:  Pleasant and cooperative female who presents alone today.   Psychiatric: Mood and affect are appropriate for the situation, setting and topic of discussion.  Patient does not appear sedated.  Integumentary:  Observed skin WNL  HEENT: EOM's grossly intact.    Chest: Breathing is non-labored.   Neurological:  Alert and oriented in all spheres including: time, place, person and situation.    Assessment:   Josy Valdez is a 51 y.o. female seen in clinic today for migraine HA. Due to CVA she is not a candidate for triptans. Failed Botox. She has completed craniosacral therapy..    Pt has been very anxious about a UDS EOB that she received. She feels a lack of resolution.     Plan:   Plan/NextSteps:     Please note there are 2 Jessica's at the [facility]. Today you saw Jessica Bustosgins when communicating any needs please specify the last name. Thank you    Medication:   Medication prescribed today hydrocodone 10/325mg    Medication use dates: (1/24-2/21)    REFILL INSTRUCTIONS:  Please contact the clinic refill line 7 days before your refill is due. Speak clearly; note cell phones cut in-and-out and poor quality speech and reception issues will influence our " ability to hear you and be efficient with your prescription.     Call [phone] leave:   Your name (first and last w/ spelling)   Date of birth  Name of all the medication(s) being requested  Dose of the medication(s)   How you are taking the medication (eg. twice per day etc).     Contact your pharmacy 3 (three) days after leaving your message to see if your prescription has been received. Please request the pharmacy check your profile to be certain about any concerns with a script failing to be received. Note: [facility] updates have been inconsistent.  If the script has not been received there may have been a problem with the communication please reach back out to the clinic.      Health Maintenance: per primary care    Records: Reviewed to assist with preparation for the office visit and are reflected throughout the note.    Follow up: 2 months      Financials: I understand you received an EOB from the insurance company for about 1800.00 dollars. This is a very concerning as it appeared none of the bill for the urine drug screen was covered. You indicate you have not received a bill from your insurance company or [employer] to date but since you have been billed a year after services you are concerned this is a bill that could show up any time. I understand you did appeal through your insurance company and this was denied. You have not received a bill and you are unclear as to the expectation from [employer]. You would like to understand the financial expectations. We discussed I will reach out to [employer] on your behalf and I have been given permission to have them contact you.    Education: Please call Monday-Friday for problems or questions and one of the clinical support staff (CSS) will help to get things figured out. The number is [phone]. Some folks are using [employer] to send and e-mail. Please remember some issues require an office visit.     Reviewed the plan of care, provided justification and  answered questions with the patient.     SAFETY REMINDERS  No alcohol while taking controlled substances. Alcohol is not an illegal substance, it is unsafe to use in combination. It is a build up of substances in the body that can be extremely hazardous and may cause respirations to slow to a dangerous rate resulting in hospitalization, brain damage, or death.    Opioid medications have been associated with sharp rise in unintentional overdose and death.  Overdose is a condition characterized by the consumption in excess of a particular drug causing adverse effects. Symptoms of overdose include: breathing slow and shallow, erratic or not at all, pinpoint pupils, hallucinations, confusion, muscle jerks, slack muscles, extreme sleepiness or loss of alertness, awake but not able to talk, face pale or clammy, vomiting, for lighter skinned people, the skin tone turns bluish purple, for darker skinned people, it turns grayish or ashen. If in a situation where overdose is a concern engage the emergency response system (dial 911).    Do not sell, loan, borrow or share your opioid medication with anyone. Deaths have occurred as a result of this practice. It is illegal and patients are being prosecuted.     *Universal Precautions:    UDS/Swab- UDS 8/17/16  Consent- 9/25/15  Agreement-  10/13/16     Pharmacy- as documented    -[place] reviewed online 1/9/17 - expected  Count- #4 remaining hydrocodone at appt 7/17/15   Psychological evaluation: 9/18/15  2/22/16 MME- 45mg  Pharmacogenetic testing- n/a    Management of opioid medication is inherently a moderate to high complex medial interaction based on the risk management required at each contact r/t risks and side effects.    Patient Arrived @ 1119 for a 1140 appointment.     TT: failed to document - 1241  CT: over half spent in education and counseling as outlined in the plan.    Lola Talavera APRN FNP-BC  [address] [facility] [address]    [phone]  [phone]
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
## 2021-06-09 NOTE — PROGRESS NOTES
Subjective:   Josy Valdez is a 52 y.o. female who presents for evaluation of pain. Patient was last seen 1/9/17.      Major issues:  1. Neck pain    2. Chronic migraine without aura with status migrainosus, not intractable        CC: Pain  See rooming evaluation    HPI:   Impact of pain treatments:   Analgesia: fair to poor sine the fall  Aberrant behavior: none    Aggravating factors: fall  Associated symptoms: tripped on a flip flop and hit her head on a dresser. She was not seen. She indicates she has been feeling quite a bit worse for the last few days. She has been very nauseated. She reports she did take a ketalorac a few days after the fall as she was not certain if she had injured her head. She reports feeling very tired after she takes the medication.  Location/Laterality of the pain: neck pain since the fal      Headache:  She is having times 2-3 (increased) x per week when she is not able to get the HA under control. The toradol in theses situations was assistive.   Frequency of HA: daily    Duration of HA: all day, constant  (can grow during the day)  Quality of HA: pulsing, throbbing    Location of HA: suboccipital w/ migraine then moves to the center of her head    Severity of HA: 4/10  Aura description: peripheral twinkling; black dots with the migraine, dizziness (not bad)  #of HA days per mo: more than 15 per month    Rehabilitation: She has completed Craniosacral therapy and we will monitor for any changes over time.   Interventional: TPI; Botox injection (did not offer impact and is not considered to be a option)  Prophylactic treatment: verapmil    Abortive Medication for HA: not a candidate r/t the CVA    Treated: Norco and Toradol (assistive), Dark room, sleep   Associated Mainfestiation of the migraine: Photophobia, watery eyes      Medication: Patient is taking toradol 10mg BID prn; hydrocodone 10/325mg max of 5 per day; verapmin 80mg 1 po BID, prozac 20mg 2 po qd, ASA 81mg 2 po qd;  "prochlorperazine 10mg q6prn; Miralax qd; fish oil, magnesium, melatonin.     Last opioid dose was Hydrocodone ([place]) 3/9/2017@0700 AM.    Past medications tried include prozac (on), verapamil(on), melatonin (on), tramadol (fear of serotonin syndrome), norco (on), fioricet (w/o impact), topamax (halucinations), imitrex (CVA) was assistive.    Denies a hx of Botox. MSIR(changed to hydrocodone)       Review of Systems   Musculoskeletal- + pain  Neuro- - cognitive changes  HEENT-  + headache  GI/-   +nausea,   Psych-  -  taking medication in a fashion other than prescribed      Objective:     Vitals:    03/09/17 1116   BP: 129/83   Pulse: 93   Resp: 16   Weight: 220 lb (99.8 kg)   Height: 5' 5\" (1.651 m)   PainSc:   7     Constitutional:  Pleasant and cooperative female who presents alone today. Came in and the pt was laying down in a dark room. I have not encountered this situation with her before  Psychiatric: Mood and affect are appropriate for the situation, setting and topic of discussion.  Patient does not appear sedated.  Integumentary:  Observed skin WNL. No bruising of the face observed.  HEENT: EOM's grossly intact.    Chest: Breathing is non-labored.   Neurological:  Alert and oriented in all spheres including: time, place, person and situation.    Assessment:   Josy Valdez is a 52 y.o. female seen in clinic today for migraine HA. Due to CVA she is not a candidate for triptans. Failed Botox. She has completed craniosacral therapy. She is flared up since the fall. She is having a bit more neck pain, worse HA. See options discussed below    Letter written for the UDS b/t visits she has not received a bill.     Plan:   Plan/NextSteps:     Please note there are 2 Jessica's at the [facility]. Today you saw Jessica Sadaf when communicating any needs please specify the last name. Thank you    Medication:   Medication prescribed today hydrocodone 10/325mg fill 3/20 for use from 3/21 to 4/18 then fill " 4/17 for use from 4/18-5/16  These were printed as we were not able to find you pharmacy in the [facility]    Supplements:  Tumeric (curcumin) - decrease inflammation 400mg w/ meals  Magnesium glycinate - muscle pain, constipation, poor sleep - work up slowly to 600mg  Probiotics     Your medication scripts were printed today    If you would like a Toradol injection for the HA you can call and schedule with a nurse to come in a get that done.    Interventional: We can thinks about a occipital block to see if this will calm the HA since th fall    Rehabilitation: We can consider a few sessions of PT to calm the HA since the fal    Integrative Approaches: We could have ou consider acupuncture      Health Maintenance: per primary care    Records: Reviewed to assist with preparation for the office visit and are reflected throughout the note.    Follow up: 2 months    Education: Please call Monday-Friday for problems or questions and one of the clinical support staff (CSS) will hel p to get things figured out. The number is [phone]. Some folks are using [address] to send and e-mail. Please remember some issues require an office visit.     Reviewed the plan of care, provided justification and answered questions with the patient.     SAFETY REMINDERS  No alcohol while taking controlled substances. Alcohol is not an illegal substance, it is unsafe to use in combination. It is a build up of substances in the body that can be extremely hazardous and may cause respirations to slow to a dangerous rate resulting in hospitalization, brain damage, or death.    Opioid medications have been associated with sharp rise in unintentional overdose and death.  Overdose is a condition characterized by the consumption in excess of a particular drug causing adverse effects. This can happen b/c you are sick, accidentally or intentionally took an extra dose, are on multiple medication that can interact. Someone took your medication and  they are not use to the medication.  Symptoms of overdose include:   !breathing slow and shallow, erratic or not at all  !pinpoint pupils, hallucinations  !confusion  !muscle jerks, slack muscles   !extreme sleepiness or loss of alertness   !awake but not able to talk   !face pale or clammy, vomiting, for lighter skinned people, the skin tone turns bluish purple, for darker skinned people, it turns grayish or ashen   If in a situation where overdose is a concern engage the emergency response system (dial 911).    Do not sell, loan, borrow or share your opioid medication with anyone. Deaths have occurred as a result of this practice. It is illegal and patients are being prosecuted.     Prevent unexpected access/loss of medication: Keep medication locked. Only carry what you need with you.    *Universal Precautions:    UDS/Swab- UDS 8/17/16  Consent- 9/25/15  Agreement- 10/13/16    Pharmacy- as documented    -MNPMP reviewed online 1/9/17 - expected  Count- #4 remaining hydrocodone at appt 7/17/15   Psychological evaluation: 9/18/15  2/22/16 MME- 45mg  Pharmacogenetic testing- n/a  MTM: n/a    Management of opioid medication is inherently a moderate to high complex medial interaction based on the risk management required at each contact r/t risks and side effects.    Patient Arrived @ 1053 for a 1100 appointment.     TT: 5375 - [phone]  CT: over half spent in education and counseling as outlined in the plan.      Lola LOU FN-BC  [address] [address]   [phone]  [phone]

## 2021-06-10 NOTE — TELEPHONE ENCOUNTER
Completed 8/4/20-9/1/20 - chronic pain  Requested Prescriptions     Signed Prescriptions Disp Refills     HYDROcodone-acetaminophen (NORCO )  mg per tablet 40 tablet 0     Sig: Take 1 tablet by mouth every 4 (four) hours as needed for pain (max of 4/day and 40/28days).     Authorizing Provider: DANNI LYNCH

## 2021-06-10 NOTE — PROGRESS NOTES
"Josy Valdez is a 55 y.o. female who is being evaluated via a billable video visit.      The patient has been notified of following:     \"This video visit will be conducted via a call between you and your physician/provider. We have found that certain health care needs can be provided without the need for an in-person physical exam.  This service lets us provide the care you need with a video conversation.  If a prescription is necessary we can send it directly to your pharmacy.  If lab work is needed we can place an order for that and you can then stop by our lab to have the test done at a later time.    Video visits are billed at different rates depending on your insurance coverage. Please reach out to your insurance provider with any questions.    If during the course of the call the physician/provider feels a video visit is not appropriate, you will not be charged for this service.\"    Patient has given verbal consent to a Video visit? Yes  How would you like to obtain your AVS? AVS Preference: Mail a copy.  If dropped by the video visit, the video invitation should be sent to: Text to cell phone: [phone]  Will anyone else be joining your video visit? No        Platform used for Video Visit: Doximity    Pain score 4  Constant   What does your pain like feel during a flare? Throbbing  Does the pain interfere with:  Work ----- no  Walking ------ no  Sleep ------- yes  Daily activities ------no  Relationships -------no  F=6    Shanita Aguilera CMA    "

## 2021-06-10 NOTE — TELEPHONE ENCOUNTER
Completed  Requested Prescriptions       TiZANidine (ZANAFLEX) 4 MG capsule 270 capsule 0     Sig: Take 3 capsules (12 mg total) by mouth at bedtime as needed for muscle spasms.     Authorizing Provider: DANNI LYNCH

## 2021-06-10 NOTE — PROGRESS NOTES
Subjective:   Jsoy Valdez is a 52 y.o. female who presents for evaluation of pain. Patient was last seen 3/9/17.      Major issues:  1. Intractable chronic migraine without aura and with status migrainosus    2. Chronic migraine without aura with status migrainosus, not intractable        CC: Pain  See rooming evaluation    HPI:   Headache:    Frequency of HA: daily    Duration of HA: all day, constant   Quality of HA: pulsing, throbbing    Location of HA: suboccipital w/ migraine then moves to the center of her head    Severity of HA: 5/10 (worse at night and is walking at night) worse at night 8/10  Aura description: peripheral twinkling; black dots with the migraine, dizziness (not bad)  #of HA days per mo: more than 15 per month    Rehabilitation: She has completed Craniosacral therapy and we will monitor for any changes over time. Hx of PT. Self massage  Interventional: TPI; Botox injection (did not offer impact and is not considered to be a option)  Prophylactic treatment: verapmil    Abortive Medication for HA: not a candidate r/t the CVA    Treated: Norco and Toradol (assistive), Dark room, sleep   Associated Mainfestiation of the migraine: Photophobia, watery eyes  Not tried acupuncture afraid of needles, not interested in chiropractic.     Medication: Patient is taking Ketorolac 10mg Take 1 tablet (10 mg total) by mouth 2 (two) times a day as needed for pain (14/28 days); hydrocodone 10/325mg Take 1 tablet by mouth every 6 (six) hours as needed for pain (and 1 po prn max of 5 per day); verapamil 80mg BID; Prozac 20mg 2 po qd    Last opioid dose was Norco last dose- 5/4/17 @ 1000am- patient took half a tablet.    Past medications tried include prozac (on), verapamil(on), melatonin (on), tramadol (fear of serotonin syndrome), norco (on), fioricet (w/o impact), topamax (halucinations), imitrex (CVA) was assistive.      Review of Systems  Constitutional- + sleep disturbances, + activity  "intolerance  Musculoskeletal- + pain  Neuro- - cognitive changes,  HEENT-  + headache  Psych-  - taking medication in a fashion other than prescribed      Objective:     Vitals:    05/04/17 1100   BP: 116/80   Pulse: 97   Resp: 14   Weight: 220 lb (99.8 kg)   Height: 5' 5\" (1.651 m)   PainSc:   5       Constitutional:  Pleasant and cooperative female who presents alone today.   Psychiatric: Mood and affect are appropriate for the situation, setting and topic of discussion.  Patient does not appear sedated.  Integumentary:  Observed skin WNL  HEENT: EOM's grossly intact.    Chest: Breathing is non-labored.   Neurological:  Alert and oriented in all spheres including: time, place, person and situation.    Assessment:   Josy Valdez is a 52 y.o. female seen in clinic today for migraine HA. Due to CVA she is not a candidate for triptans. Failed Botox. She has completed craniosacral therapy. She is doing a HEP from PT. She is open to adding a muscle relaxer to aid with the night time pain that influences her sleep. I will assume responsibility for the verapamil and the fluoxetine    Plan:   Plan/NextSteps:     Please note there are 2 Jessica's at the [facility]. Today you saw Jessica Sadaf when communicating any needs please specify the last name. Thank you    Medication:   Medication prescribed today fluoxetine 20mg 1 refill  (taking over); hydrocodone 10/325mg; ketorolac 10mg 1 refill, verapamil 80mg 1 refill (taking over); tizanidine 4mg (new for you) 1 rfill    Medication use dates: 5/16-6/13 then 6/13-7/11    REFILL INSTRUCTIONS:  Please contact the clinic refill line 7 days before your refill is due. Speak clearly; note cell phones cut in-and-out and poor quality speech and reception issues will influence our ability to hear you and be efficient with your prescription.     Call [phone] leave:   Your name (first and last w/ spelling)   Date of birth  Name of all the medication(s) being " requested  Dose of the medication(s)   How you are taking the medication (eg. twice per day etc).     Contact your pharmacy 3 (three) days after leaving your message to see if your prescription has been received. Please request the pharmacy check your profile to be certain about any concerns with a script failing to be received. Note: [place] updates have been inconsistent.  If the script has not been received there may have been a problem with the communication please reach back out to the clinic.     MTM: we could consider having you see a clinical pain pharmacist.     Rehabilitation: keep up your home exercises    Integrative Approaches: we could consider acupuncture I understand needles are not your fav.     Health Maintenance: per primary care    Records: Reviewed to assist with preparation for the office visit and are reflected throughout the note.    Follow up: 8 weeks    Education: Please call Monday-Friday for problems or questions and one of the clinical support staff (CSS) will hel p to get things figured out. The number is [phone]. Some folks are using [place] to send and e-mail. Please remember some issues require an office visit.     Reviewed the plan of care, provided justification and answered questions with the patient.     SAFETY REMINDERS  No alcohol while taking controlled substances. Alcohol is not an illegal substance, it is unsafe to use in combination. It is a build up of substances in the body that can be extremely hazardous and may cause respirations to slow to a dangerous rate resulting in hospitalization, brain damage, or death.    Opioid medications have been associated with sharp rise in unintentional overdose and death.  Overdose is a condition characterized by the consumption in excess of a particular drug causing adverse effects. This can happen b/c you are sick, accidentally or intentionally took an extra dose, are on multiple medication that can interact. Someone took your  medication and they are not use to the medication.  Symptoms of overdose include:   !breathing slow and shallow, erratic or not at all  !pinpoint pupils, hallucinations  !confusion  !muscle jerks, slack muscles   !extreme sleepiness or loss of alertness   !awake but not able to talk   !face pale or clammy, vomiting, for lighter skinned people, the skin tone turns bluish purple, for darker skinned people, it turns grayish or ashen   If in a situation where overdose is a concern engage the emergency response system (dial 911).    Do not sell, loan, borrow or share your opioid medication with anyone. Deaths have occurred as a result of this practice. It is illegal and patients are being prosecuted.     Prevent unexpected access/loss of medication: Keep medication locked. Only carry what you need with you.    **Universal Precautions:    UDS/Swab- UDS 8/17/16  Consent- 9/25/15  Agreement- 10/13/16    Pharmacy- as documented    -MNPMP reviewed online 1/9/17 - expected  Count- #4 remaining hydrocodone at appt 7/17/15   Psychological evaluation: 9/18/15  2/22/16 MME- 45mg  Pharmacogenetic testing- n/a  MTM: n/a        Management of opioid medication is inherently a moderate to high complex medial interaction based on the risk management required at each contact r/t risks and side effects.    Lola Talavera APRN FNP-BC  [address] [facility] [address]   [phone]  [phone]

## 2021-06-10 NOTE — TELEPHONE ENCOUNTER
Medication being requested: Verapamil  Last visit date: 6/11 Provider: jania  Next visit date: 8/28 Provider: jania  Expected follow up: 8 weeks  MTM visit (Pain Center) date: no  Pertinent between visit information about requested medication (telephone, mychart, prior authorization, concerns): no  Last date prescribed: 4/15 for #180 tabs, 90 day supply  Provider responsible: jania  Spoke with patient: no  Script being sent to provider - dates and quantity:   Requested Prescriptions     Pending Prescriptions Disp Refills     verapamiL (CALAN) 80 MG tablet 180 tablet 0     Sig: Take 1 tablet (80 mg total) by mouth 2 (two) times a day.     Pharmacy cued: [facility]

## 2021-06-10 NOTE — TELEPHONE ENCOUNTER
Adding tizanidine refill to recently completed template  Requested Prescriptions     Pending Prescriptions Disp Refills     TiZANidine (ZANAFLEX) 4 MG capsule 270 capsule 0     Sig: Take 3 capsules (12 mg total) by mouth at bedtime as needed for muscle spasms.     Signed Prescriptions Disp Refills     HYDROcodone-acetaminophen (NORCO )  mg per tablet 40 tablet 0     Sig: Take 1 tablet by mouth every 4 (four) hours as needed for pain (max of 4/day and 40/28days).     Authorizing Provider: DANNI LYNCH

## 2021-06-10 NOTE — PATIENT INSTRUCTIONS - HE
Plan:   Thank you for participating in the doximity video visit - Here is the Plan of Care / NextSteps:     Follow up by: 10/25/20    [place] assistance with Video Visits [phone].    Communicating with us:  Please call Monday-Friday for problems or questions - leave a message and one of the clinical support staff (CSS) will help to get things figured out. The number is [phone].     You may also opt to communicate through [employer].     Social: Please remember to engage in physical distancing. Continue to connect socially with family and friends by telephone communication and virtual engagement.     Primary Care: Contact your primary care clinic for any health related concerns or for any recommendations. You may also communicate with your primary care clinic for questions; The [facility] Hotline phone number is [phone] or [phone]; or you may login to [email or web address] or call [phone].    Rehabilitation: Remain active    Medication prescribed / to be continued:   Medication prescribed today:    Requested Prescriptions     Signed Prescriptions Disp Refills     TiZANidine (ZANAFLEX) 4 MG capsule 270 capsule 0     Sig: Take 3 capsules (12 mg total) by mouth at bedtime as needed for muscle spasms.     verapamiL (CALAN) 80 MG tablet 180 tablet 0     Sig: Take 1 tablet (80 mg total) by mouth 2 (two) times a day.     HYDROcodone-acetaminophen (NORCO )  mg per tablet 9/1/20-9/29/20 - chronic pain 40 tablet 0     Sig: Take 1 tablet by mouth every 4 (four) hours as needed for pain (max of 4/day and 40/28days).     prochlorperazine (COMPAZINE) 10 MG tablet 30 tablet 2     Sig: Take 1 tablet (10 mg total) by mouth every 6 (six) hours as needed for nausea.     HYDROcodone-acetaminophen (NORCO )  mg per tablet 9/29-10/27 chronic pain 40 tablet 0     Sig: Take 1 tablet by mouth every 4 (four) hours as needed for pain (max of 4/day and 40/28days).         REFILL  INSTRUCTIONS:   Please contact your pharmacy and talk with your pharmacist for any refills of controlled substances. It will be beneficial to know the name of your medication, the date it was written ( 8/28/2020 ) and the date you are able to fill. Please remember the date filled and the date started in some cases are different. Please remember to use your medication during the dates of use.

## 2021-06-10 NOTE — PROGRESS NOTES
Josy Valdez is a 55 y.o. female is being evaluated migraine HA. Patient was last evaluated 6/11/20 reviewed record.     Start Time: 0909  End Time: 0932    Visit Details  Confirmed understanding the video encounter is billable     Type of Service: video Visit  Originating Location for the patient: home  Distant Location:  [facility]  Platform used: video.  Video Conference via doximity    Major issues:  1. Intractable chronic migraine without aura and with status migrainosus    2. Chronic migraine without aura with status migrainosus, not intractable    3. Nausea    4. Intractable chronic migraine without aura and without status migrainosus      Patient Active Problem List   Diagnosis     Migraine     Myalgia     Plantar fasciitis, bilateral     Hip pain, chronic, left     Lumbago       HPI:    Headache:   Frequency of HA: daily, migraines about 1/week  Duration of HA: all day, constant, migraines will last 2days  Quality of HA: pulsing, throbbing    Location of HA: suboccipital w/ migraine then moves to the center of her head - neck pain,  Head - center of the head and back of the skull. Her neck will ache when she has a migraine  Severity of HA: 4/10  Aura description: peripheral twinkling; black dots with the migraine, dizziness (has not bother her for some time); she will get forgetful and she will yawn a lot before a HA  #of HA days per mo: daily baseline HA  Rehabilitation: She has completed Craniosacral therapy and we will monitor for any changes over time. Hx of PT. Self massage  Interventional: TPI; Botox injection (did not offer impact and is not considered to be a option)  Prophylactic treatment: verapmil , medical cannabis  Abortive Medication for HA: not a candidate r/t the CVA, medical cannabis  Treated: Norco and Toradol (assistive), Dark room, sleep, medical cannabis, OTC topical  Associated Mainfestiation of the migraine: Photophobia, watery eyes  Not tried acupuncture  afraid of needles, not interested in chiropractic    Any New pain, injuries, falls: no  Since last visit pain has: stable        Functional Symptoms: Pain interferes with:  Sleep:she is not sleeping well it has been a bit worse the last few weeks.  Walking:    Ambulation/Transfer: Pt is ambulatory. Transfers independently. S  Work:    Family: her daughter has been very stressed and she is struggling with COVID19 she is working very little and is considering moving back in with her parents    animal: dog  ADL's: independent   Shopping: mostly ordering in  Concentration: no specific concerns  Relationships/Social: Engaging in physical distancing. Reports feeling socially connected. She rarely gest out b/c of COVID      Impact of pain treatments:   Patient reports function has improved with current pain treatment: yes      Pain Plan of Care Review:   Medication:   Last opioid dose was Hydrocodone last taken on 8/28/20 @ 6am    Medication changes: no  Medication side/adverse effects: no  Aberrant behavior: no  Considerations: no      Current Outpatient Medications:      aspirin 81 mg chewable tablet, Chew 81 mg daily., Disp: , Rfl:      atorvastatin (LIPITOR) 20 MG tablet, Take 20 mg by mouth., Disp: , Rfl:      azelastine (ASTEPRO) 0.15 % (205.5 mcg) Spry nasal spray, Apply 205.5 mcg into each nostril 2 (two) times a day., Disp: , Rfl:      CHOLECALCIFEROL, VITAMIN D3, (VITAMIN D3 ORAL), Take by mouth., Disp: , Rfl:      DOCOSAHEXANOIC ACID/EPA (FISH OIL ORAL), Take by mouth., Disp: , Rfl:      fluoxetine HCl (PROZAC ORAL), Take 60 mg by mouth., Disp: , Rfl:      GARLIC ORAL, Take by mouth., Disp: , Rfl:      hydroCHLOROthiazide (HYDRODIURIL) 12.5 MG tablet, Take 12.5 mg by mouth daily., Disp: , Rfl:      HYDROcodone-acetaminophen (NORCO )  mg per tablet, Take 1 tablet by mouth every 4 (four) hours as needed for pain (max of 4/day and 40/28days)., Disp: 40 tablet, Rfl: 0 - continued     ketorolac  (TORADOL) 10 mg tablet, Take 1 tablet (10 mg total) by mouth 2 (two) times a day as needed for pain (max of 10/28 days)., Disp: 10 tablet, Rfl: 2 - has not been using     L.ACID/L.CASEI/B.BIF/B.ERIC/FOS (PROBIOTIC BLEND ORAL), Take by mouth., Disp: , Rfl:      losartan (COZAAR) 50 MG tablet, Take 100 mg by mouth daily., Disp: , Rfl:      melatonin 3 mg TbER, Take 6 mg by mouth., Disp: , Rfl:      multivitamin capsule, Take 1 capsule by mouth daily., Disp: , Rfl:      omeprazole (PRILOSEC) 20 MG capsule, TAKE 1 CAPSULE BY MOUTH EVERY DAY, Disp: 30 capsule, Rfl: 1     pantoprazole (PROTONIX) 20 MG tablet, , Disp: , Rfl:      polyethylene glycol (GLYCOLAX) 17 gram/dose powder, , Disp: , Rfl:      prochlorperazine (COMPAZINE) 10 MG tablet, Take 1 tablet (10 mg total) by mouth every 6 (six) hours as needed for nausea., Disp: 10 tablet, Rfl: 2     TiZANidine (ZANAFLEX) 4 MG capsule, Take 3 capsules (12 mg total) by mouth at bedtime as needed for muscle spasms., Disp: 270 capsule, Rfl: 0 - continued     UNABLE TO FIND, Medical Cannabis, Disp: , Rfl:  - continued     verapamiL (CALAN) 80 MG tablet, Take 1 tablet (80 mg total) by mouth 2 (two) times a day., Disp: 180 tablet, Rfl: 0 - continued      Medical Cannabis:  Qualifying Condition: Chronic Intractable Pain - migrain HA  Qualifying Provider: [facility] as of 8/28/20  [email or web address]  Email: [email or web address] (will transition her to [facility]    Use validated tools to assess pain and function: 0-10 point scale. Note Severity Pain Score above.   Opioid MME up to 40 / getting 40pills per month ; Benzodiazepines no ; Muscle relaxers + ; Sleep aids no ; Gabapentinoids no  Comprehensive, Multidisciplinary Treatment Plan - as noted    Past 6 months medications changes used for pain: no    Benefit (1- no benefit; 7- a great deal of benefitts) from taking medical cannabis 7  List  benefit(s) less pain, less anxiety, improved IBS, generally helps me cope    Negative effect  (s) (1- no negative effects; 7- a great deal of negative effects) 1     Program improvements or additional information more offices, less expensive    Rehabilitation:  Home exercise program: gardening, yard work, dog walking    Review of Systems   Musculoskeletal- + pain  Neuro- - cognitive changes  HEENT: + HA (improved with cannabis)  GI: IBS - good with the medical cannabis  Psych-  + mood concerns (stable),  - taking medication in a fashion other than prescribed    Social  No family history on file.  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   Psych: mood and affect are appropriate. She really looks better  Chest: no shortness of breath   Neurological:  Alert and oriented in all spheres including: time, place, person and situation.    Diagnostics:   Lab:  Notes recorded by Lola Talavera CNP on 3/2/2020 at 9:50 AM CST   Reviewed note 2/21/20 Last opioid dose was Hydrocodone last taken on 2/21/20 @ 10am, medical cannabis 2/20/20     UDT:   Detected mariajuana metabolite (on medical cannabis program); Detected hydrocodone and metabolites (expected)         :   8/28/2020 Reviewed to aid with decision regarding medication management  Narcotic= 290  Sedative= 120  Stimulant= 000  OD risk= 170    Last script:  Date: 8/4/20 due 9/1/20  Medication: hydrocodone-a  Dose: 10/325mg   Dispensed: 40   Prescriber: jania    Expected dispensing: yes    Scheduled medication from other professionals: no         Assessment: Dated 11/12/2019 ANJELICA Score: 13   Assessment: Dated 7/16/2019 ANJELICA  Score: 16  Assessment: Dated 3/22/2019 ANJELICA Score: 16    Assessment:   Josy Valdez is a 55 y.o. female . Doximity video evaluation for migraine HA. Due to CVA she is not a candidate for triptans. Failed Botox. She has completed craniosacral therapy. She is doing a HEP from PT.   She started the medical cannabis program this has been going well. At this time she is not interested in the CGRP therapy as she feels stable.       She has done exceptionally well with the current combination of medication. Would like distance office visit for as long as possible     *Universal Precautions:    UDT/Swab- 2/21/20  Consent-11/5/19  Agreement- 11/5/19  Pharmacy- as documented    Count- #4 remaining hydrocodone at appt 7/17/15   Psychological evaluation: DA 9/18/15  MME- 40  1/22/19 MME=10  03/21/2019 MME- 10   05/23/2019 MME- 10  8/29/20 MME up to 40 receiving 40pills/month  Pharmacogenetic testing- n/a  MTM: n/a.  Medical cannabis re-qualified on 8/28/20 [facility]    Management of opioid medication is inherently a moderate to high complex medial interaction based on the risk management required at each contact r/t risks and side effects.      Plan:   Thank you for participating in the doximity video visit - Here is the Plan of Care / NextSteps:     Follow up by: 10/25/20    [facility] assistance with Video Visits [phone].    Communicating with us:  Please call Monday-Friday for problems or questions - leave a message and one of the clinical support staff (CSS) will help to get things figured out. The number is [phone].     You may also opt to communicate through [employer].     Social: Please remember to engage in physical distancing. Continue to connect socially with family and friends by telephone communication and virtual engagement.     Primary Care: Contact your primary care clinic for any health related concerns or for any recommendations. You may also communicate with your primary care clinic for questions; The  [place] Department of Health Hotline phone number is [phone] or [phone]; or you may login to [email or web address] or call [phone].    Rehabilitation: Remain active    Medication prescribed / to be continued:   Medication prescribed today:    Requested Prescriptions     Signed Prescriptions Disp Refills     TiZANidine (ZANAFLEX) 4 MG capsule 270 capsule 0     Sig: Take 3 capsules (12 mg total) by mouth at bedtime as needed for muscle spasms.     verapamiL (CALAN) 80 MG tablet 180 tablet 0     Sig: Take 1 tablet (80 mg total) by mouth 2 (two) times a day.     HYDROcodone-acetaminophen (NORCO )  mg per tablet 9/1/20-9/29/20 - chronic pain 40 tablet 0     Sig: Take 1 tablet by mouth every 4 (four) hours as needed for pain (max of 4/day and 40/28days).     prochlorperazine (COMPAZINE) 10 MG tablet 30 tablet 2     Sig: Take 1 tablet (10 mg total) by mouth every 6 (six) hours as needed for nausea.     HYDROcodone-acetaminophen (NORCO )  mg per tablet 9/29-10/27 chronic pain 40 tablet 0     Sig: Take 1 tablet by mouth every 4 (four) hours as needed for pain (max of 4/day and 40/28days).         REFILL INSTRUCTIONS:   Please contact your pharmacy and talk with your pharmacist for any refills of controlled substances. It will be beneficial to know the name of your medication, the date it was written ( 8/28/2020 ) and the date you are able to fill. Please remember the date filled and the date started in some cases are different. Please remember to use your medication during the dates of use.      Lola LOU FNP-BC  [address] [address]   [phone]  [phone]        Lola Talavera, CNP

## 2021-06-11 NOTE — PROGRESS NOTES
Subjective:   Josy Valdez is a 52 y.o. female who presents for evaluation of pain. See rooming evaluation. Patient was last seen 5/4/17.     CC: Pain. Review of current status. Patient general opinion of symptoms management and function is fair    Major issues:  1. Chronic pain syndrome    2. Chronic migraine without aura with status migrainosus, not intractable    3. Intractable chronic migraine without aura and with status migrainosus          Patient Active Problem List   Diagnosis     Migraine     Myalgia     Plantar fasciitis, bilateral     Hip pain, chronic, left     Lumbago           HPI:   Impact of pain treatments:   Analgesia: fair   AE's: none  Aberrant behavior: none    Headache:    Frequency of HA: daily    Duration of HA: all day, constant   Quality of HA: pulsing, throbbing    Location of HA: suboccipital w/ migraine then moves to the center of her head    Severity of HA: 5/10. Worse since I last saw her 9/10 (when issues with bowel  Aura description: peripheral twinkling; black dots with the migraine, dizziness (not bad)  #of HA days per mo: more than 15 per month    Rehabilitation: She has completed Craniosacral therapy and we will monitor for any changes over time. Hx of PT. Self massage  Interventional: TPI; Botox injection (did not offer impact and is not considered to be a option)  Prophylactic treatment: verapmil    Abortive Medication for HA: not a candidate r/t the CVA    Treated: Norco and Toradol (assistive), Dark room, sleep   Associated Mainfestiation of the migraine: Photophobia, watery eyes  Not tried acupuncture afraid of needles, not interested in chiropractic.    Activities Impaired by Increasing Pain Severity: F= 7  3-Enjoy  4-Work, Enjoy  5-Active, Mood Work Enjoy  6-Sleep, Active, Mood Work Enjoy  7-Walk, Sleep, Active, Mood Work Enjoy  8-Relate, Walk, Sleep, Active, Mood Work Enjoy      Medication:   Last opioid dose was Hydrocodone 10/325 mg taken on 06/29/17 @  600a.      Current Outpatient Prescriptions:      aspirin 81 mg chewable tablet, Chew 81 mg daily., Disp: , Rfl:      azelastine (ASTEPRO) 0.15 % (205.5 mcg) Spry nasal spray, Apply 205.5 mcg into each nostril 2 (two) times a day., Disp: , Rfl:      CHOLECALCIFEROL, VITAMIN D3, (VITAMIN D3 ORAL), Take by mouth., Disp: , Rfl:      DOCOSAHEXANOIC ACID/EPA (FISH OIL ORAL), Take by mouth., Disp: , Rfl:      FERROUS SULFATE, DRIED (IRON, DRIED, ORAL), Take by mouth., Disp: , Rfl:      GARLIC ORAL, Take by mouth., Disp: , Rfl:      HYDROcodone-acetaminophen (NORCO )  mg per tablet, Take 1 tablet by mouth every 6 (six) hours as needed for pain (and 1 po prn max of 5 per day)., Disp: 140 tablet, Rfl: 0     IBUPROFEN IB ORAL, Take by mouth., Disp: , Rfl:      L.ACID/L.CASEI/B.BIF/B.ERIC/FOS (PROBIOTIC BLEND ORAL), Take by mouth., Disp: , Rfl:      melatonin 3 mg TbER, Take 6 mg by mouth., Disp: , Rfl:      multivitamin capsule, Take 1 capsule by mouth daily., Disp: , Rfl:      polyethylene glycol (GLYCOLAX) 17 gram/dose powder, , Disp: , Rfl:      simvastatin (ZOCOR) 20 MG tablet, Take 20 mg by mouth bedtime., Disp: , Rfl:      FLUoxetine (PROZAC) 20 MG capsule, Take 1 capsule (20 mg total) by mouth 2 (two) times a day., Disp: 60 capsule, Rfl: 1     ketorolac (TORADOL) 10 mg tablet, Take 1 tablet (10 mg total) by mouth 2 (two) times a day as needed for pain (14/28 days)., Disp: 14 tablet, Rfl: 1 - incident with bleeding presented to the ED was felt to be an infection and not r/t the toradol. Pt using sparingly     omeprazole (PRILOSEC) 20 MG capsule, Take 1 capsule (20 mg total) by mouth daily., Disp: 30 capsule, Rfl: 0     TiZANidine (ZANAFLEX) 4 MG capsule, Take 1-2 capsules (4-8 mg total) by mouth at bedtime., Disp: 60 capsule, Rfl: 1 (this was assistive for the muscle relaxation of her neck but not getting good pain mitigation. Feels like it is not strong enough. She will take at night and this is assistive  "for the side effects of being drowzy. She would like to take 1 additional     verapamil (CALAN) 80 MG tablet, Take 1 tablet (80 mg total) by mouth 2 (two) times a day., Disp: 60 tablet, Rfl: 1    Review of Systems   Constitutional- + sleep disturbances (better than she use to - she has an odd schedule),   Allergic/Immunologic: question of GI infection.   Respiratory: -SOB  CV:  denied light headedness  Neuro- - cognitive changes, denies dizziness  GI: + diarrhea - she is seeing improvement.  Psych-  - taking medication in a fashion other than prescribed    Social  No family history on file.  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   Psychiatric: Mood and affect are appropriate for the situation, setting and topic of discussion.  Patient does not appear sedated.  Integumentary:  Observed skin WNL  HEENT: EOM's grossly intact.    Chest: Breathing is non-labored.   Neurological:  Alert and oriented in all spheres including: time, place, person and situation.    Diagnostics:   Lab:  Last UDT on 8/17/16 (she has continued to be concerned about the billing situation surrounding this UDT).      Imaging:  No new imaging available to review    :  Dated 6/29/2017    Assessment:   Josy Valdez is a 52 y.o. female seen in clinic today for  migraine HA. Due to CVA she is not a candidate for triptans. Failed Botox. She has completed craniosacral therapy. She is doing a HEP from PT. She is open to adding a muscle relaxer to aid with the night time pain that influences her sleep.     I did a chart review with the pt today as " she was again denied coverage by BC/BS for the UDT. We have monitored , prescribing, behavioral health, have remained within the limit of 2 /year of testing the UDT. I have written a letter. Pt was able to see I am adhering to the recommendations per her insurance and I still need to be within compliance of opioid universal precautions.    *Universal Precautions:    UDS/Swab- UDS 8/17/16  Consent- 9/25/15  Agreement- 10/13/16    Pharmacy- as documented    -[place] reviewed [place] 1/9/17 - expected  Count- #4 remaining hydrocodone at appt 7/17/15   Psychological evaluation: DA 9/18/15  2/22/16 MME- 45mg  Pharmacogenetic testing- n/a  MTM: n/a     Management of opioid medication is inherently a moderate to high complex medial interaction based on the risk management required at each contact r/t risks and side effects.      Plan:   Plan/NextSteps:     Follow up: 2 months     Education:   Please note there are 2 Jessica's at the [facility]. Today you saw Jessica Talavera when communicating any needs please specify the last name. Thank you    Please call Monday-Friday for problems or questions and one of the clinical support staff (CSS) will help to get things figured out. The number is [phone]. Some folks are using [employer] to send an e-mail ([employer] message). Please remember some issues require an office visit.     Today we reviewed the plan of care and answered questions.      Health Maintenance: Please continue to see primary care for general medical prevention and illness care.    Records: Reviewed to assist with preparation for the office visit and are reflected throughout the note.    Medication: We dicussed the concern about the combination of the verapamil and the tizanidine in combination leading to excessive drowsiness and low blood pressure. At this time you are doing ok and we will continue the medication. I am making an adjustment in the tizanidine today, this is an increase and you will  report to me how you are doing and will stop the medication if there are any concerns.    Medication prescribed today:     Requested Prescriptions     Signed Prescriptions Disp Refills     FLUoxetine (PROZAC) 20 MG capsule 60 capsule 1     Sig: Take 1 capsule (20 mg total) by mouth 2 (two) times a day.     ketorolac (TORADOL) 10 mg tablet 14 tablet 1     Sig: Take 1 tablet (10 mg total) by mouth 2 (two) times a day as needed for pain (14/28 days).     TiZANidine (ZANAFLEX) 4 MG capsule 90 capsule 1     Sig: Take 1-3 capsules (4-12 mg total) by mouth at bedtime.     verapamil (CALAN) 80 MG tablet 60 tablet 1     Sig: Take 1 tablet (80 mg total) by mouth 2 (two) times a day.     HYDROcodone-acetaminophen (NORCO )  mg per tablet 7/11-8/8 then 8/8-9/5 140 tablet 0     Sig: Take 1 tablet by mouth every 6 (six) hours as needed for pain (and 1 po prn max of 5 per day).     Check with your pharmacy that the prescriptions are on your profile. Call the pharmacy a week before you want to fill the prescription note the date the script was written may be assistive to the pharmacist. Access to opioids is decreasing as part of federal mandates due to concerns about the opioid epidemic. The pharmacy may need to order the medication for you. Ordering medication typically takes about a week. Please remind the pharmacist the opioid prescription was written on 6/29/2017.      SAFETY REMINDERS  No alcohol while taking controlled substances. Alcohol is not an illegal substance, it is unsafe to use in combination. It is a build up of substances in the body that can be extremely hazardous and may cause respirations to slow to a dangerous rate resulting in hospitalization, brain damage, or death.    Unintentional overdose and death.    People are not always in perfect health. Sometimes the body is weak or compromised because of an illness like the flu, pneumonia, low bood etc. When the body struggles, even though a person is  taking their medication as prescribed, the medication may be too much for the body to handle. Combinations of medication can also put a person at high risk. In one study it was noted that 80% of unintentional overdoses occurred in people who were taking a combination of opioids and benzodiazepines.    A big concern would be if someone else got your medication. Your medication is not prescribed to anyone other than you. Your medication could cause harm or death to someone else.    Naloxone is a rescue medication. A person may need the rescue medication if experiencing an unexpected overdose. The medication is meant to give a person a break by lifting off one burden, the opioid narcotic medication. Medical care is required because the a persons body is compromised and needs further testing and assistance.    Symptoms of overdose include:   !breathing slow and shallow, erratic or not at all  !pinpoint pupils, hallucinations  !confusion  !muscle jerks, slack muscles   !extreme sleepiness or loss of alertness   !awake but not able to talk   !face pale or clammy, vomiting, for lighter skinned people, the skin tone turns bluish purple, for darker skinned people, it turns grayish or ashen   If in a situation where overdose is a concern engage the emergency response system (dial 911).    Do not sell, loan, borrow or share your opioid medication with anyone. Deaths have occurred as a result of this practice. It is illegal and patients are being prosecuted.     Prevent unexpected access/loss of medication: Keep medication locked. Only carry what you need with you.      Patient Arrived @ 1033 for a 1100 appointment.     TT: 1103 - 1133  CT: over half spent in education and counseling as outlined in the plan.      Lola Talavera APRN [facility]  [address] [address]   [phone]  [phone]

## 2021-06-12 NOTE — PROGRESS NOTES
Subjective:   Josy Valdez is a 52 y.o. female who presents for evaluation of pain. See rooming evaluation. Patient was last seen 6/29/17     CC: Pain. Review of current status. Patient general opinion of symptoms management and function is fair    Major issues:  1. Chronic migraine without aura with status migrainosus, not intractable    2. Intractable chronic migraine without aura and with status migrainosus          Patient Active Problem List   Diagnosis     Migraine     Myalgia     Plantar fasciitis, bilateral     Hip pain, chronic, left     Lumbago     HPI:   Impact of pain treatments:   Analgesia: fair  ADL's:  Household: Patient is able to participate in general chores. Social: Patient is engaged with family and friends in a meaningful fashion.   AE's: none   Aberrant behavior: none      Headache:    Frequency of HA: daily    Duration of HA: all day, constant   Quality of HA: pulsing, throbbing    Location of HA: suboccipital w/ migraine then moves to the center of her head    Severity of HA: 5/10. Worse since I last saw her 9/10 (when issues with bowel  Aura description: peripheral twinkling; black dots with the migraine, dizziness (not bad)  #of HA days per mo: more than 15 per month    Rehabilitation: She has completed Craniosacral therapy and we will monitor for any changes over time. Hx of PT. Self massage  Interventional: TPI; Botox injection (did not offer impact and is not considered to be a option)  Prophylactic treatment: verapmil    Abortive Medication for HA: not a candidate r/t the CVA    Treated: Norco and Toradol (assistive), Dark room, sleep   Associated Mainfestiation of the migraine: Photophobia, watery eyes  Not tried acupuncture afraid of needles, not interested in chiropractic.      Medication:   Last opioid dose was Hydrocodone 08/29/2017 @0945 am.      Current Outpatient Prescriptions:      aspirin 81 mg chewable tablet, Chew 81 mg daily., Disp: , Rfl:      azelastine (ASTEPRO) 0.15 %  (205.5 mcg) Spry nasal spray, Apply 205.5 mcg into each nostril 2 (two) times a day., Disp: , Rfl:      CHOLECALCIFEROL, VITAMIN D3, (VITAMIN D3 ORAL), Take by mouth., Disp: , Rfl:      DOCOSAHEXANOIC ACID/EPA (FISH OIL ORAL), Take by mouth., Disp: , Rfl:      FERROUS SULFATE, DRIED (IRON, DRIED, ORAL), Take by mouth., Disp: , Rfl:      FLUoxetine (PROZAC) 20 MG capsule, Take 1 capsule (20 mg total) by mouth 2 (two) times a day., Disp: 60 capsule, Rfl: 1     GARLIC ORAL, Take by mouth., Disp: , Rfl:      HYDROcodone-acetaminophen (NORCO )  mg per tablet, Take 1 tablet by mouth every 6 (six) hours as needed for pain (and 1 po prn max of 5 per day)., Disp: 140 tablet, Rfl: 0          ketorolac (TORADOL) 10 mg tablet, Take 1 tablet (10 mg total) by mouth 2 (two) times a day as needed for pain (14/28 days)., Disp: 14 tablet, Rfl: 1     L.ACID/L.CASEI/B.BIF/B.ERIC/FOS (PROBIOTIC BLEND ORAL), Take by mouth., Disp: , Rfl:      melatonin 3 mg TbER, Take 6 mg by mouth., Disp: , Rfl:      multivitamin capsule, Take 1 capsule by mouth daily., Disp: , Rfl:      omeprazole (PRILOSEC) 20 MG capsule, Take 1 capsule (20 mg total) by mouth daily., Disp: 30 capsule, Rfl: 0     polyethylene glycol (GLYCOLAX) 17 gram/dose powder, , Disp: , Rfl:      simvastatin (ZOCOR) 20 MG tablet, Take 20 mg by mouth bedtime., Disp: , Rfl:      TiZANidine (ZANAFLEX) 4 MG capsule, TAKE 1-3 CAPSULES (4-12 MG TOTAL) BY MOUTH AT BEDTIME., Disp: 90 capsule, Rfl: 1     verapamil (CALAN) 80 MG tablet, Take 1 tablet (80 mg total) by mouth 2 (two) times a day., Disp: 60 tablet, Rfl: 1    Review of Systems   Constitutional- +sleep disturbances (atypical sleep pattern), - activity intolerance  Neuro- - cognitive changes (occasional semantic retrieval)  HEENT-  + headache  Psych-  - taking medication in a fashion other than prescribed    Social  No family history on file.  History   Smoking Status     Former Smoker   Smokeless Tobacco     Not on file  "    History   Alcohol Use No     Comment: in recovery     History   Drug Use No     History   Sexual Activity     Sexual activity: Not on file       Objective:     Vitals:    08/29/17 1153   BP: 136/82   Pulse: 86   Resp: 14   Weight: 220 lb (99.8 kg)   Height: 5' 5\" (1.651 m)   PainSc:   5       Constitutional:  Pleasant and cooperative female who presents w/ her  today.   Psychiatric: Mood and affect are appropriate for the situation, setting and topic of discussion.  Patient does not appear sedated. She tends to be serious  Integumentary:  Observed skin WNL  HEENT: EOM's grossly intact.    Chest: Breathing is non-labored.   Neurological:  Alert and oriented in all spheres including: time, place, person and situation.    Diagnostics:   Lab:  Last UDT on 8/17/16 (she reports the billing situation has finally be resolved).        Imaging:  No new imaging available to review    :  Dated 8/29/2017    Assessment:   Josy Valdez is a 52 y.o. female seen in clinic today for migraine HA. Due to CVA she is not a candidate for triptans. Failed Botox. She has completed craniosacral therapy. She is doing a HEP from PT. She is open to adding a muscle relaxer to aid with the night time pain that influences her sleep.     Insurance issues and payment for the last UDT have been worked out. The pt is interested in self pay for the UDT as it appears her insurance is not willing to pay for the UDT. I am unclear if we can enter and have her billed in this fashion. I have turned this over to the MA's for assistance.    *Universal Precautions:    UDS/Swab- UDS 8/17/16  Consent- 9/25/15  Agreement- 10/13/16    Pharmacy- as documented    -[place] reviewed online 1/9/17 - expected  Count- #4 remaining hydrocodone at appt 7/17/15   Psychological evaluation: DA 9/18/15  2/22/16 MME- 45mg  Pharmacogenetic testing- n/a  MTM: n/a    Management of opioid medication is inherently a moderate to high complex medial interaction based on " the risk management required at each contact r/t risks and side effects.    Plan:   Plan/NextSteps:     Follow up: 8 weeks      Education:   Please note there are 2 Jessica's at the [facility]. Today you saw Jessica Talavera when communicating any needs please specify the last name. Thank you    Please call Monday-Friday for problems or questions and one of the clinical support staff (CSS) will help to get things figured out. The number is [phone]. Some folks are using [employer] to send an e-mail ([employer] message). Please remember some issues require an office visit.     Today we reviewed the plan of care and answered questions.      Health Maintenance: Please continue to see primary care for general medical prevention and illness care.    Records: Reviewed to assist with preparation for the office visit and are reflected throughout the note.    Medication:   Medication prescribed today     Requested Prescriptions     Pending Prescriptions Disp Refills     HYDROcodone-acetaminophen (NORCO )  mg per tablet Please fill 8/29 for use 8/31-9/28 then fill 9/26/17 for use from 9/28-10/26 140 tablet 0     Sig: Take 1 tablet by mouth every 6 (six) hours as needed for pain (and 1 po prn max of 5 per day).     FLUoxetine (PROZAC) 20 MG capsule 60 capsule 1     Sig: Take 1 capsule (20 mg total) by mouth 2 (two) times a day.     ketorolac (TORADOL) 10 mg tablet 14 tablet 1     Sig: Take 1 tablet (10 mg total) by mouth 2 (two) times a day as needed for pain (14/28 days).     verapamil (CALAN) 80 MG tablet 60 tablet 1     Sig: Take 1 tablet (80 mg total) by mouth 2 (two) times a day.       Check with your pharmacy that the prescriptions are on your profile. Call the pharmacy a week before you want to fill the prescription note the date the script was written may be assistive to the pharmacist. Access to opioids is decreasing as part of federal mandates due to concerns about the opioid epidemic. The  pharmacy may need to order the medication for you. Ordering medication typically takes about a week. Please remind the pharmacist the opioid prescription was written on 8/29/2017.      SAFETY REMINDERS  No alcohol while taking controlled substances. Alcohol is not an illegal substance, it is unsafe to use in combination. It is a build up of substances in the body that can be extremely hazardous and may cause respirations to slow to a dangerous rate resulting in hospitalization, brain damage, or death.    Unintentional overdose and death.    People are not always in perfect health. Sometimes the body is weak or compromised because of an illness like the flu, pneumonia, low bood etc. When the body struggles, even though a person is taking their medication as prescribed, the medication may be too much for the body to handle. Combinations of medication can also put a person at high risk. In one study it was noted that 80% of unintentional overdoses occurred in people who were taking a combination of opioids and benzodiazepines.    A big concern would be if someone else got your medication. Your medication is not prescribed to anyone other than you. Your medication could cause harm or death to someone else.    Naloxone is a rescue medication. A person may need the rescue medication if experiencing an unexpected overdose. The medication is meant to give a person a break by lifting off one burden, the opioid narcotic medication. Medical care is required because the a persons body is compromised and needs further testing and assistance.    Symptoms of overdose include:   !breathing slow and shallow, erratic or not at all  !pinpoint pupils, hallucinations  !confusion  !muscle jerks, slack muscles   !extreme sleepiness or loss of alertness   !awake but not able to talk   !face pale or clammy, vomiting, for lighter skinned people, the skin tone turns bluish purple, for darker skinned people, it turns grayish or ashen   If in a  situation where overdose is a concern engage the emergency response system (dial 911).    Do not sell, loan, borrow or share your opioid medication with anyone. Deaths have occurred as a result of this practice. It is illegal and patients are being prosecuted.     Prevent unexpected access/loss of medication: Keep medication locked. Only carry what you need with you.      Patient Arrived @ 1127 for a 1140 appointment.     TT: 1213 - 1225  CT: over half spent in education and counseling as outlined in the plan.      Lola Talavera APRN FNP-BC  [address] [facility] [address]   [phone]  [phone]

## 2021-06-13 NOTE — PATIENT INSTRUCTIONS - HE
Plan:   Thank you for participating in the doximity visit - Here is the Plan of Care / NextSteps:     Contact [phone] to reserve a time. You need to follow up by: mid to end of [place] assistance with Video Visits [phone].    Communicating with us:  Please call Monday-Friday for problems or questions - leave a message and one of the clinical support staff (CSS) will help to get things figured out. The number is [phone].     You may also opt to communicate through [email or web address].     Social: Please remember to engage in physical distancing. Continue to connect socially with family and friends by telephone communication and virtual engagement.     Primary Care: You need to have an annual physical and check in with your primary care folks on a regular basis. Talk with your primary care about the frequency of expected visits.    COVID19: Contact your primary care clinic for any health related concerns or for any recommendations. You may also communicate with your primary care clinic for questions; [facility] Hotline phone number is [phone] or [phone]; or you may login to [employer] or call [phone].    Rehabilitation: Remain active continue your home exercises and stretches you may also want to consider- Patricia Arellano - You Tube walking in place 20 minute Brisk Walk [email or web address]     Integrative: medical cannabis    Medication prescribed / to be continued: Confirmed you have a script on file for the hydrocodone at your pharmacy - you can pick it up. If you need another before your next visit let use know          REFILL INSTRUCTIONS:     Please contact the clinic 3-7 working days before your refill is due - use either [email or web address] or the telephone system not both.      Call [phone] leave: If calling speak clearly; note cell phones cut in-and-out and poor quality speech and reception issues will influence our ability to hear you and be efficient  with your prescription.   Your name (first and last w/ spelling)   Date of birth  Name of all the medication(s) being requested  Dose of the medication(s)   How you are taking the medication (eg. twice per day etc).     After 3 business days - please contact your pharmacy and talk with your pharmacist about any government Controlled/Scheduled medications.  Please request the pharmacist check your profile to be certain about any concerns with a script failing to be received.   If the script has not been received there may have been a problem with the communication please reach back out to the clinic.     Due to increased volume we will not be able to call you and make you aware of your scripts.

## 2021-06-13 NOTE — PROGRESS NOTES
Subjective:   Josy Valdez is a 52 y.o. female who presents for evaluation of pain. See rooming evaluation. Patient was last seen 8/29/17.     CC: Pain. Review of current status. Patient general opinion of symptoms management and function is fair    Major issues:  1. Chronic migraine without aura with status migrainosus, not intractable    2. Intractable chronic migraine without aura and with status migrainosus          Patient Active Problem List   Diagnosis     Migraine     Myalgia     Plantar fasciitis, bilateral     Hip pain, chronic, left     Lumbago           HPI:   Impact of pain treatments:   Analgesia: fair   ADL's:  Grooming: Able to bath, dress and eat w/o assistance. Household: Patient is able to participate in general chores. Social: Patient is engaged with family and friends in a meaningful fashion.   AE's: fatigue  Aberrant behavior: none      Headache:  she is feeling stable. Migraines are a couple of times a week  Frequency of HA: daily    Duration of HA: all day, constant   Quality of HA: pulsing, throbbing    Location of HA: suboccipital w/ migraine then moves to the center of her head    Severity of HA: 5/10. Worse since I last saw her 9/10 (when issues with bowel  Aura description: peripheral twinkling; black dots with the migraine, dizziness (not bad)  #of HA days per mo: more than 15 per month    Rehabilitation: She has completed Craniosacral therapy and we will monitor for any changes over time. Hx of PT. Self massage  Interventional: TPI; Botox injection (did not offer impact and is not considered to be a option)  Prophylactic treatment: verapmil    Abortive Medication for HA: not a candidate r/t the CVA    Treated: Norco and Toradol (assistive), Dark room, sleep   Associated Mainfestiation of the migraine: Photophobia, watery eyes  Not tried acupuncture afraid of needles, not interested in chiropractic.      Medication:   Last opioid dose was Hydrocodon 10/2417 @ 6719.      Current  Outpatient Prescriptions:      aspirin 81 mg chewable tablet, Chew 81 mg daily., Disp: , Rfl:      azelastine (ASTEPRO) 0.15 % (205.5 mcg) Spry nasal spray, Apply 205.5 mcg into each nostril 2 (two) times a day., Disp: , Rfl:      CHOLECALCIFEROL, VITAMIN D3, (VITAMIN D3 ORAL), Take by mouth., Disp: , Rfl:      DOCOSAHEXANOIC ACID/EPA (FISH OIL ORAL), Take by mouth., Disp: , Rfl:      FLUoxetine (PROZAC) 20 MG capsule, Take 1 capsule (20 mg total) by mouth 2 (two) times a day., Disp: 60 capsule, Rfl: 1     GARLIC ORAL, Take by mouth., Disp: , Rfl:      HYDROcodone-acetaminophen (NORCO )  mg per tablet, Take 1 tablet by mouth every 6 (six) hours as needed for pain (and 1 po prn max of 5 per day)., Disp: 140 tablet, Rfl: 0     ketorolac (TORADOL) 10 mg tablet, Take 1 tablet (10 mg total) by mouth 2 (two) times a day as needed for pain (14/28 days)., Disp: 14 tablet, Rfl: 1     L.ACID/L.CASEI/B.BIF/B.ERIC/FOS (PROBIOTIC BLEND ORAL), Take by mouth., Disp: , Rfl:      melatonin 3 mg TbER, Take 6 mg by mouth., Disp: , Rfl:      multivitamin capsule, Take 1 capsule by mouth daily., Disp: , Rfl:      omeprazole (PRILOSEC) 20 MG capsule, Take 1 capsule (20 mg total) by mouth daily., Disp: 30 capsule, Rfl: 0     polyethylene glycol (GLYCOLAX) 17 gram/dose powder, , Disp: , Rfl:      simvastatin (ZOCOR) 20 MG tablet, Take 20 mg by mouth bedtime., Disp: , Rfl:      TiZANidine (ZANAFLEX) 4 MG capsule, TAKE 1-3 CAPSULES (4-12 MG TOTAL) BY MOUTH AT BEDTIME., Disp: 90 capsule, Rfl: 1     verapamil (CALAN) 80 MG tablet, Take 1 tablet (80 mg total) by mouth 2 (two) times a day., Disp: 60 tablet, Rfl: 1    Review of Systems   Constitutional- occasionally activity intolerance  Musculoskeletal- - pain  Neuro- - cognitive changes  Psych-  - taking medication in a fashion other than prescribed    Social  No family history on file.  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   Psychiatric: Mood and affect are appropriate for the situation, setting and topic of discussion.  Patient does not appear sedated.  Integumentary:  Observed skin WNL  HEENT: EOM's grossly intact.    Chest: Breathing is non-labored.   Neurological:  Alert and oriented in all spheres including: time, place, person and situation.      Diagnostics:   Lab:  Last UDT on 8/17/16 (she reports the billing situation has finally be resolved).    Will hold on UDT plan in December 2017    Imaging:  No new imaging available to review    :  Dated 10/24/2017    Assessment:   Josy Valdez is a 52 y.o. female seen in clinic today for migraine HA. Due to CVA she is not a candidate for triptans. Failed Botox. She has completed craniosacral therapy. She is doing a HEP from PT. She is open to adding a muscle relaxer to aid with the night time pain that influences her sleep.      I will plan to have her get the UDT in December/Janary as I would like to see her financials even out a bit.      *Universal Precautions:    UDS/Swab- UDS 8/17/16  Consent- 9/25/15  Agreement- 10/13/16    Pharmacy- as documented    -MNPMP reviewed online 1/9/17 - expected  Count- #4 remaining hydrocodone at appt 7/17/15   Psychological evaluation: DA 9/18/15  2/22/16 MME- 45mg  Pharmacogenetic testing- n/a  MTM: n/a    Management of opioid medication is inherently a moderate to high complex medial interaction based on the risk management required at each contact r/t risks and side effects.    Plan:   Plan/NextSteps:     Follow up: 8 weeks    Education:   Please note there are 2 Jessica's at the " [facility]. Today you saw Jessica Talavera when communicating any needs please specify the last name. Thank you    Please call Monday-Friday for problems or questions and one of the clinical support staff (CSS) will help to get things figured out. The number is [phone]. Some folks are using [employer] to send an e-mail ([employer] message). Please remember some issues require an office visit.     Today we reviewed the plan of care and answered questions.      Health Maintenance: Please continue to see primary care for general medical prevention and illness care.    Records: Reviewed to assist with preparation for the office visit and are reflected throughout the note.    Medication:   Medication prescribed today     Requested Prescriptions     Signed Prescriptions Disp Refills     verapamil (CALAN) 80 MG tablet 180 tablet 0     Sig: Take 1 tablet (80 mg total) by mouth 2 (two) times a day.     ketorolac (TORADOL) 10 mg tablet 14 tablet 1     Sig: Take 1 tablet (10 mg total) by mouth 2 (two) times a day as needed for pain (14/28 days).     FLUoxetine (PROZAC) 20 MG capsule 180 capsule 0     Sig: Take 1 capsule (20 mg total) by mouth 2 (two) times a day.     TiZANidine (ZANAFLEX) 4 MG capsule 270 capsule 0     Sig: Take 1-3 capsules (4-12 mg total) by mouth at bedtime as needed for muscle spasms.     HYDROcodone-acetaminophen (NORCO )  mg per tablet  fill 10/25 10/26-11/23 then 11/22/17 for use from 11/23-12/21 140 tablet 0     Sig: Take 1 tablet by mouth every 6 (six) hours as needed for pain (and 1 po prn max of 5 per day).       REFILL INSTRUCTIONS:  Please contact the clinic refill line 7 days before your refill is due. Speak clearly; note cell phones cut in-and-out and poor quality speech and reception issues will influence our ability to hear you and be efficient with your prescription.     Call [phone] leave:   Your name (first and last w/ spelling)   Date of birth  Name of all the  medication(s) being requested  Dose of the medication(s)   How you are taking the medication (eg. twice per day etc).     Contact your pharmacy 3 (three) days after leaving your message to see if your prescription has been received. Please request the pharmacy check your profile to be certain about any concerns with a script failing to be received. Note: [place] updates have been inconsistent.  If the script has not been received there may have been a problem with the communication please reach back out to the clinic.       SAFETY REMINDERS  No alcohol while taking controlled substances. Alcohol is not an illegal substance, it is unsafe to use in combination. It is a build up of substances in the body that can be extremely hazardous and may cause respirations to slow to a dangerous rate resulting in hospitalization, brain damage, or death.    Unintentional overdose and death.    People are not always in perfect health. Sometimes the body is weak or compromised because of an illness like the flu, pneumonia, low bood etc. When the body struggles, even though a person is taking their medication as prescribed, the medication may be too much for the body to handle. Combinations of medication can also put a person at high risk. In one study it was noted that 80% of unintentional overdoses occurred in people who were taking a combination of opioids and benzodiazepines.    A big concern would be if someone else got your medication. Your medication is not prescribed to anyone other than you. Your medication could cause harm or death to someone else.    Naloxone is a rescue medication. A person may need the rescue medication if experiencing an unexpected overdose. The medication is meant to give a person a break by lifting off one burden, the opioid narcotic medication. Medical care is required because the a persons body is compromised and needs further testing and assistance.    Symptoms of overdose include:   !breathing slow  and shallow, erratic or not at all  !pinpoint pupils, hallucinations  !confusion  !muscle jerks, slack muscles   !extreme sleepiness or loss of alertness   !awake but not able to talk   !face pale or clammy, vomiting, for lighter skinned people, the skin tone turns bluish purple, for darker skinned people, it turns grayish or ashen   If in a situation where overdose is a concern engage the emergency response system (dial 911).    Do not sell, loan, borrow or share your opioid medication with anyone. Deaths have occurred as a result of this practice. It is illegal and patients are being prosecuted.     Prevent unexpected access/loss of medication: Keep medication locked. Only carry what you need with you.      Patient Arrived @ 1034 for a 1100 appointment.     TT: 1116 - 1139  CT: over half spent in education and counseling as outlined in the plan.      Lola LOU FNP-BC  [address] [facility] [address]   [phone]  [phone]

## 2021-06-13 NOTE — PROGRESS NOTES
Josy Valdez is a 55 y.o. female is being evaluated migraine HA. Patient was last evaluated 8/28/20 reviewed record.     Start Time: 1342  End Time:1357    Visit Details  Type of Service: video Visit  Originating Location for the patient: home (home, clinic, work etc)  Distant Location:  [facility]  Platform used: video.  Video Conference via [address] [employer]    Major issues:  1. Intractable chronic migraine without aura and with status migrainosus    2. Gastroesophageal reflux disease without esophagitis    3. Nausea    4. Intractable chronic migraine without aura and without status migrainosus    5. Chronic migraine without aura with status migrainosus, not intractable      Patient Active Problem List   Diagnosis     Migraine     Myalgia     Plantar fasciitis, bilateral     Hip pain, chronic, left     Lumbago       HPI:    Since last visit pain has: stable    Headache: no significant changes  Frequency of HA: daily, migraines about 1/week  Duration of HA: all day, constant, migraines will last 2days  Quality of HA: pulsing, throbbing    Location of HA: suboccipital w/ migraine then moves to the center of her head - neck pain,  Head - center of the head and back of the skull. Her neck will ache when she has a migraine  Severity of HA: 4/10  Aura description: peripheral twinkling; black dots with the migraine, dizziness (has not bother her for some time); she will get forgetful and she will yawn a lot before a HA  #of HA days per mo: daily baseline HA  Rehabilitation: She has completed Craniosacral therapy and we will monitor for any changes over time. Hx of PT. Self massage  Interventional: TPI; Botox injection (did not offer impact and is not considered to be a option)  Prophylactic treatment: verapmil , medical cannabis  Abortive Medication for HA: not a candidate r/t the CVA, medical cannabis  Treated: Norco and Toradol (assistive), Dark room, sleep, medical cannabis, OTC topical  Associated  Mainfestiation of the migraine: Photophobia, watery eyes  Not tried acupuncture afraid of needles, not interested in chiropractic         Functional Symptoms: Pain interferes with:  Sleep: no sleeping well  Walking:    Ambulation/Transfer: Pt is ambulatory. Transfers independently. Stairs no specific concerns  Work:     Animal Care: dog   Family: distance visting  ADL's: independent she is keeping   Housing: secure  Concentration: no concerns  Relationships/Social: Engaging in physical distancing. Reports feeling socially connected.      Impact of pain treatments:   Patient reports function has improved with current pain treatment: yes      Pain Plan of Care Review:   Medication:   Last opioid dose was Hydrocodone last taken on 11/19/20 @ 6am    Medication changes: no  Medication side/adverse effects: no  Aberrant behavior: no  Considerations: no      Current Outpatient Medications:      aspirin 81 mg chewable tablet, Chew 81 mg daily., Disp: , Rfl:      atorvastatin (LIPITOR) 20 MG tablet, Take 20 mg by mouth., Disp: , Rfl:      azelastine (ASTEPRO) 0.15 % (205.5 mcg) Spry nasal spray, Apply 205.5 mcg into each nostril 2 (two) times a day., Disp: , Rfl:      CHOLECALCIFEROL, VITAMIN D3, (VITAMIN D3 ORAL), Take by mouth., Disp: , Rfl:      DOCOSAHEXANOIC ACID/EPA (FISH OIL ORAL), Take by mouth., Disp: , Rfl:      fluoxetine HCl (PROZAC ORAL), Take 60 mg by mouth., Disp: , Rfl:      GARLIC ORAL, Take by mouth., Disp: , Rfl:      hydroCHLOROthiazide (HYDRODIURIL) 12.5 MG tablet, Take 12.5 mg by mouth daily., Disp: , Rfl:      HYDROcodone-acetaminophen (NORCO )  mg per tablet, Take 1 tablet by mouth every 4 (four) hours as needed for pain (max of 4/day and 40/28days)., Disp: 40 tablet, Rfl: 0 - continued use sparingly     HYDROcodone-acetaminophen (NORCO )  mg per tablet, Take 1 tablet by mouth every 4 (four) hours as needed for pain (max of 4/day and 40/28days)., Disp: 40 tablet, Rfl:  0     ketorolac (TORADOL) 10 mg tablet, Take 1 tablet (10 mg total) by mouth 2 (two) times a day as needed for pain (max of 10/28 days)., Disp: 10 tablet, Rfl: 2 - she has not taken     L.ACID/L.CASEI/B.BIF/B.ERIC/FOS (PROBIOTIC BLEND ORAL), Take by mouth., Disp: , Rfl:      losartan (COZAAR) 50 MG tablet, Take 100 mg by mouth daily., Disp: , Rfl:      melatonin 3 mg TbER, Take 6 mg by mouth., Disp: , Rfl:      multivitamin capsule, Take 1 capsule by mouth daily., Disp: , Rfl:      omeprazole (PRILOSEC) 20 MG capsule, TAKE 1 CAPSULE BY MOUTH EVERY DAY, Disp: 30 capsule, Rfl: 1     pantoprazole (PROTONIX) 20 MG tablet, , Disp: , Rfl:      polyethylene glycol (GLYCOLAX) 17 gram/dose powder, , Disp: , Rfl:      prochlorperazine (COMPAZINE) 10 MG tablet, Take 1 tablet (10 mg total) by mouth every 6 (six) hours as needed for nausea., Disp: 30 tablet, Rfl: 2 - she has not used as much used 5 times in the last couple of months     TiZANidine (ZANAFLEX) 4 MG capsule, Take 3 capsules (12 mg total) by mouth at bedtime as needed for muscle spasms., Disp: 270 capsule, Rfl: 0 - continued     UNABLE TO FIND, Medical Cannabis - chronic intractable Pain - Migraine, Disp: , Rfl: - contineud     verapamiL (CALAN) 80 MG tablet, Take 1 tablet (80 mg total) by mouth 2 (two) times a day., Disp: 180 tablet, Rfl: 0 - continued      Medical Cannabis:  Medical Cannabis:  Qualifying Condition: Chronic Intractable Pain - migrain HA  Qualifying Provider: [facility] as of 8/28/20  Dispensary: [email or web address]  Email: [email or web address] (will transition her to [facility]    Use validated tools to assess pain and function: 0-10 point scale. Note Severity Pain Score above.   Opioid MME up to 40 / getting 40pills per month (stretching this out at time a couple of months) ; Benzodiazepines no ; Muscle relaxers + ; Sleep aids no ; Gabapentinoids no  Comprehensive, Multidisciplinary Treatment Plan - as noted    Transactions:  10/17/20    Products:  Tangerine oral  suspension, tangerine vape, tangerine caps    Past 6 months medications changes used for pain: stable    Benefit (1- no benefit; 7- a great deal of benefitts) from taking medical cannabis 6  List  benefit(s) IBS is better, sleep better, pain is better, helps with anxiety    Negative effect (s) (1- no negative effects; 7- a great deal of negative effects) 2  List negative effect(s) stinging of the throat, tired  Specific negative effect(s)  Physical side effects: stomach upset, fatigue, headache,3 blurred vision no  Mental/cognitive side effects: mental clouding, confusion, depression no  Worsening of symptoms related to the condition being treated no    Difficulty/inconvenience in accessing medical cannabis expense    Rehabilitation:  Home exercise program: walking the dog      Review of Systems   Musculoskeletal- + pain  Neuro- - cognitive changes  HEENT: + HA (improved with cannabis)  GI: IBS - good with the medical cannabis  Psych-  + mood concerns (stable),  - taking medication in a fashion other than prescribed    Social  No family history on file.  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 Dr. Austin  Psychiatric: Mood and affect are appropriate for the situation, setting and topic of discussion.  Patient does not appear sedated.  Integumentary:  Observed skin WNL.   HEENT: EOM's grossly intact.    Chest: Breathing is non-labored.   Neurological:  Alert and oriented in all spheres including: time, place, person and  situation.    Diagnostics:   Lab:  Notes recorded by Lola Talavera CNP on 3/2/2020 at 9:50 AM CST   Reviewed note 2/21/20 Last opioid dose was Hydrocodone last taken on 2/21/20 @ 10am, medical cannabis 2/20/20     UDT:   Detected mariajuana metabolite (on medical cannabis program); Detected hydrocodone and metabolites (expected)       :   11/19/2020 Reviewed to aid with decision regarding medication management  Narcotic= 230  Sedative= 090  Stimulant= 000  OD risk= 280    Last script:  Date: 9/14/20  Medication: hydrocodone  Dose: 10/325mg   Dispensed: 40   Prescriber: jania    Expected dispensing: she is using less    Scheduled medication from other professionals: no         Assessment: Dated 11/12/2019 ANJELICA Score: 13   Assessment: Dated 7/16/2019 ANJELICA Score: 16  Assessment: Dated 3/22/2019 ANJELICA Score: 16    Assessment:   Josy Valdez is a 55 y.o. female . Doximity video evaluation for migraine HA. Due to CVA she is not a candidate for triptans. Failed Botox. She has completed craniosacral therapy. She is doing a HEP from PT.   She started the medical cannabis program this has been going well. At this time she is not interested in the CGRP therapy as she feels stable.       She has done exceptionally well with the current combination of medication and medical cannabis.     Continue video visits     *Universal Precautions:    UDT/Swab- 2/21/20  Consent-11/5/19  Agreement- 11/5/19  Pharmacy- as documented    Count- #4 remaining hydrocodone at appt 7/17/15   Psychological evaluation: DA 9/18/15  MME- 40  1/22/19 MME=10  03/21/2019 MME- 10   05/23/2019 MME- 10  8/29/20 MME up to 40 receiving 40pills/month  Pharmacogenetic testing- n/a  MTM: n/a.  Medical cannabis re-qualified on 8/28/20 [facility]    Management of opioid medication is inherently a moderate to high complex medial interaction based on the risk management required at each contact r/t risks and side effects.      Plan:   Thank you for participating in the  doximity visit - Here is the Plan of Care / NextSteps:     Contact [phone] to reserve a time. You need to follow up by: mid to end of [place] assistance with Video Visits [phone].    Communicating with us:  Please call Monday-Friday for problems or questions - leave a message and one of the clinical support staff (CSS) will help to get things figured out. The number is [phone].     You may also opt to communicate through EyesBot.     Social: Please remember to engage in physical distancing. Continue to connect socially with family and friends by telephone communication and virtual engagement.     Primary Care: You need to have an annual physical and check in with your primary care folks on a regular basis. Talk with your primary care about the frequency of expected visits.    COVID19: Contact your primary care clinic for any health related concerns or for any recommendations. You may also communicate with your primary care clinic for questions; [facility] Hotline phone number is [phone] or [phone]; or you may login to [employer] or call [phone].    Rehabilitation: Remain active continue your home exercises and stretches you may also want to consider- Patricia Arellano - You Tube walking in place 20 minute Brisk Walk [email or web address]     Integrative: medical cannabis    Medication prescribed / to be continued: Confirmed you have a script on file for the hydrocodone at your pharmacy - you can pick it up. If you need another before your next visit let use know          REFILL INSTRUCTIONS:     Please contact the clinic 3-7 working days before your refill is due - use either EyesBot or the telephone system not both.      Call [phone] leave: If calling speak clearly; note cell phones cut in-and-out and poor quality speech and reception issues will influence our ability to hear you and be efficient with your prescription.   Your name (first and  last w/ spelling)   Date of birth  Name of all the medication(s) being requested  Dose of the medication(s)   How you are taking the medication (eg. twice per day etc).     After 3 business days - please contact your pharmacy and talk with your pharmacist about any government Controlled/Scheduled medications.  Please request the pharmacist check your profile to be certain about any concerns with a script failing to be received.   If the script has not been received there may have been a problem with the communication please reach back out to the clinic.     Due to increased volume we will not be able to call you and make you aware of your scripts.      Lola LOU FNP-BC  [address] [facility] [id number]   [phone]  [phone]        Lola Talavera, CNP

## 2021-06-13 NOTE — PROGRESS NOTES
"Josy Valdez is a 55 y.o. female who is being evaluated via a billable video visit.      The patient has been notified of following:     \"This video visit will be conducted via a call between you and your physician/provider. We have found that certain health care needs can be provided without the need for an in-person physical exam.  This service lets us provide the care you need with a video conversation.  If a prescription is necessary we can send it directly to your pharmacy.  If lab work is needed we can place an order for that and you can then stop by our lab to have the test done at a later time.    Video visits are billed at different rates depending on your insurance coverage. Please reach out to your insurance provider with any questions.    If during the course of the call the physician/provider feels a video visit is not appropriate, you will not be charged for this service.\"    Patient has given verbal consent to a Video visit? Yes  How would you like to obtain your AVS? AVS Preference: Mail a copy.  If dropped by the video visit, the video invitation should be sent to: Text to cell phone: [phone]  Will anyone else be joining your video visit? No        Platform used for Video Visit: Doximity    Pain score 4  Constant   What does your pain like feel during a flare? Ache and throbbing  Does the pain interfere with:  Work -----no (doesn't work outside of home)  Walking ------ no  Sleep ------- yes  Daily activities ------no  Relationships -------no  F=6    Shanita Aguilera CMA    "

## 2021-06-14 NOTE — PATIENT INSTRUCTIONS - HE
Plan:   Thank you for participating in the doximity video visit - Here is the Plan of Care / NextSteps:     Contact [phone] to reserve a time. You need to follow up by: 3/18/21      [place] assistance with Video Visits [phone].    Communicating with us:  Please call Monday-Friday for problems or questions - leave a message and one of the clinical support staff (CSS) will help to get things figured out. The number is [phone].     You may also opt to communicate through nVoq.     Rehabilitation: Remain active continue your home exercises and stretches     Medication prescribed / to be continued:   Medication prescribed today:    Requested Prescriptions     Signed Prescriptions Disp Refills     HYDROcodone-acetaminophen (NORCO )  mg per tablet fill on 2/18; for use 2/19-3/19 40 tablet 0     Sig: Take 1 tablet by mouth every 4 (four) hours as needed for pain (max of 4/day and 40/28days).     verapamiL (CALAN) 80 MG tablet 180 tablet 0     Sig: Take 1 tablet (80 mg total) by mouth 2 (two) times a day.     TiZANidine (ZANAFLEX) 4 MG capsule 270 capsule 0     Sig: Take 3 capsules (12 mg total) by mouth at bedtime as needed for muscle spasms.     prochlorperazine (COMPAZINE) 10 MG tablet 30 tablet 0     Sig: Take 1 tablet (10 mg total) by mouth every 6 (six) hours as needed for nausea.         REFILL INSTRUCTIONS:   Please contact your pharmacy and talk with your pharmacist for any refills of controlled substances. It will be beneficial to know the name of your medication, the date it was written ( 1/26/2021 ) and the date you are able to fill. Please remember the date filled and the date started in some cases are different. Please remember to use your medication during the dates of use.

## 2021-06-14 NOTE — PROGRESS NOTES
Josy Valdez is a 55 y.o. female who is being evaluated via a billable video visit.      How would you like to obtain your AVS? Mail a copy.  If dropped from the video visit, the video invitation should be resent by: Text to cell phone: [phone]  Will anyone else be joining your video visit? No    Patient is here for a follow up appointment with Lola Talavera CNP. Patient has migraine pain, describes the pain as constant and throbbing, rates the pain as 4/10. Patient needs refills for Norco. Functionality is 4. Patient states their pain interferes with sleep. CSA and UDT were completed at today's appointment.    Kimberly Olivia LPN

## 2021-06-14 NOTE — PROGRESS NOTES
Subjective:   Josy Valdez is a 52 y.o. female who presents for evaluation of pain. See rooming evaluation. Patient was last seen 10/24/17     CC: Pain.     Major issues:  1. Chronic migraine without aura with status migrainosus, not intractable    2. Intractable chronic migraine without aura and with status migrainosus          Patient Active Problem List   Diagnosis     Migraine     Myalgia     Plantar fasciitis, bilateral     Hip pain, chronic, left     Lumbago       HPI:  Impact of pain treatments:   Analgesia: fair   ADL's:Social: She reports her dog  and she is very upset today. This has led to an increase in her HA. She indicates the family will be coming over for the holidays. She  Indicates she will be purchasing food. She she does not over exert  Timing: constant  Quality: throbbing  Severity: 5/10    Headache:  she is feeling stable. Migraines are a couple of times a week  Frequency of HA: daily    Duration of HA: all day, constant   Quality of HA: pulsing, throbbing    Location of HA: suboccipital w/ migraine then moves to the center of her head    Severity of HA: 5/10. Worse since I last saw her 9/10 (when issues with bowel  Aura description: peripheral twinkling; black dots with the migraine, dizziness (not bad)  #of HA days per mo: more than 15 per month    Rehabilitation: She has completed Craniosacral therapy and we will monitor for any changes over time. Hx of PT. Self massage  Interventional: TPI; Botox injection (did not offer impact and is not considered to be a option)  Prophylactic treatment: verapmil    Abortive Medication for HA: not a candidate r/t the CVA    Treated: Norco and Toradol (assistive), Dark room, sleep   Associated Mainfestiation of the migraine: Photophobia, watery eyes  Not tried acupuncture afraid of needles, not interested in chiropractic.      Medication:   Last opioid dose was Hydrocodone-acetaminophen 17 @ 0900 am      Current Outpatient Prescriptions:       hydroCHLOROthiazide (HYDRODIURIL) 12.5 MG tablet, Take 12.5 mg by mouth daily., Disp: , Rfl:      aspirin 81 mg chewable tablet, Chew 81 mg daily., Disp: , Rfl:      azelastine (ASTEPRO) 0.15 % (205.5 mcg) Spry nasal spray, Apply 205.5 mcg into each nostril 2 (two) times a day., Disp: , Rfl:      CHOLECALCIFEROL, VITAMIN D3, (VITAMIN D3 ORAL), Take by mouth., Disp: , Rfl:      DOCOSAHEXANOIC ACID/EPA (FISH OIL ORAL), Take by mouth., Disp: , Rfl:      FLUoxetine (PROZAC) 20 MG capsule, Take 1 capsule (20 mg total) by mouth 2 (two) times a day., Disp: 180 capsule, Rfl: 0 - has about a month of medication remaining     GARLIC ORAL, Take by mouth., Disp: , Rfl:      HYDROcodone-acetaminophen (NORCO )  mg per tablet, Take 1 tablet by mouth every 6 (six) hours as needed for pain (and 1 po prn max of 5 per day)., Disp: 140 tablet, Rfl: 0 - contineud     ketorolac (TORADOL) 10 mg tablet, Take 1 tablet (10 mg total) by mouth 2 (two) times a day as needed for pain (14/28 days)., Disp: 14 tablet, Rfl: 1 - continued     L.ACID/L.CASEI/B.BIF/B.ERIC/FOS (PROBIOTIC BLEND ORAL), Take by mouth., Disp: , Rfl:      melatonin 3 mg TbER, Take 6 mg by mouth., Disp: , Rfl:      multivitamin capsule, Take 1 capsule by mouth daily., Disp: , Rfl:      omeprazole (PRILOSEC) 20 MG capsule, Take 1 capsule (20 mg total) by mouth daily., Disp: 30 capsule, Rfl: 0     polyethylene glycol (GLYCOLAX) 17 gram/dose powder, , Disp: , Rfl:      simvastatin (ZOCOR) 20 MG tablet, Take 20 mg by mouth bedtime., Disp: , Rfl:      TiZANidine (ZANAFLEX) 4 MG capsule, Take 1-3 capsules (4-12 mg total) by mouth at bedtime as needed for muscle spasms., Disp: 270 capsule, Rfl: 0- has about a month remaining     verapamil (CALAN) 80 MG tablet, Take 1 tablet (80 mg total) by mouth 2 (two) times a day., Disp: 180 tablet, Rfl: 0 - has about a month of medication remaining    Review of Systems   Constitutional- occasionally activity intolerance, She has a  "unique sleep schedule but it appears to work for her  Musculoskeletal- - pain  Neuro- - cognitive changes  Psych-  - taking medication in a fashion other than prescribed, + sadness about the lss of her dog.        Social  No family history on file.  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   Psychiatric: Mood and affect are tearful over the loss of her dog.  Patient does not appear sedated.  Integumentary:  Observed skin WNL  HEENT: EOM's grossly intact.    Chest: Breathing is non-labored.   Neurological:  Alert and oriented in all spheres including: time, place, person and situation.    Diagnostics:   Lab:  Last UDT on 12/19/17.  Reviewed 12/29/17. Detected hydrocodone and metabolites (expected)     Imaging:  No new imaging available to review    :  Dated 12/28/2017    Assessment:   Josy Valdez is a 52 y.o. female seen in clinic today for migraine HA. Due to CVA she is not a candidate for triptans. Failed Botox. She has completed craniosacral therapy. She is doing a HEP from PT. She is open to adding a muscle relaxer to aid with the night time pain that influences her sleep.       I will plan to have her get the UDT in December/Janary as I would like to see her financials even out a bit.       *Universal Precautions:    UDS/Swab- UDS 8/17/16  Consent- 9/25/15  Agreement- 10/13/16    Pharmacy- as documented    -[place] reviewed [place] 1/9/17 - expected  Count- #4 remaining hydrocodone at appt 7/17/15   Psychological evaluation: DA 9/18/15  2/22/16 MME- 45mg  Pharmacogenetic testing- n/a  MTM: " n/a    Management of opioid medication is inherently a moderate to high complex medial interaction based on the risk management required at each contact r/t risks and side effects.    Plan:   Plan/NextSteps:     Follow up: 8 weeks     Education:   Please call Monday-Friday for problems or questions and one of the clinical support staff (CSS) will help to get things figured out. The number is [phone]. Some folks are using [employer] to send an e-mail ([employer] message). Please remember some issues require an office visit.     Today we reviewed the plan of care and answered questions.      Medical cannabis has been approved for chronic intractable pain in [place]. The implementation date August 2016.     Your medical staff does not prescribe medical cannabis. Marijuana is classified as a Schedule I narcotic according to the [place]-this is a Federal Agency. Medical providers are not able to prescribe Schedule I drugs. The [facility] has approved medical cannabis for Qualifying Conditions. A person would need to be Certified as having a Qualifying Condition. A registered medical provider may Certify the Qualifying Condition.    Once Certified with a Qualifying Condition a person can schedule with a Dispensary. The Dispensary staff would determine the preparation of medical cannabis. It is expected the Dispensary would be following a person to determine the impact of the medical cannabis. During these visit with the Dispensary staff adjustments in the medical cannabis would be made. The goal of the visits to to provide adjustments, monitor for harm, improve side effects and promote the greatest impact on the Qualifying Condition.     Note: We expect you will reduce w/ a goal of full discontinuation, any opioid pain medication.     Medical cannabis is not obtained from another state, friends, from the streets or grown in your own home. In the [facility] it is expected a person who has Qualified would obtain the medical  "cannabis from one of the approved Dispensaries.      The make-up of medical cannabis typically has lower THC levels. THC is responsible for the \"high\" associated with recreational marijuana. The level of CBD appears to be responsible for the impact on pain control.    I expect to learn more about the risks, benefits and legal issues over time. This is not a treatment that has been researched due to the schedule I status. It will be important for participants to provide information on side effects. It does not have strong medical data to support. If you have been reluctant to participate in other recommended treatments because of concerns about being a \"guinea pig' this is not a good option for your health care.    At this time medical cannabis is not covered by medical insurance. Costs include but may not be limited to: registration fee; visit fee and product fee. You would need to be in communication with the medical cannabis program for the greatest insights about the cost.    If this is an area of interest please research at [email or web address]. www.FDA.gov      Health Maintenance: Please continue to see primary care for general medical prevention and illness care.    Diagnostics: UDT/SWAB collected 12/19/17 results are pending.  UDT/SWAB:  Patient required a random Urine Drug Testing, due to the need to comply with [place] Model Policy Guidelines and CDC Guideline for the use of any controlled substances. This is to ensure that patient is compliant with treatment, and monitor for risks such as diversion, abuse, or any other aberrant behaviors. Patient is either being considered for or taking a controlled substance. Unexpected findings will be discussed and treatment decision may be adjusted. Testing is being implemented across the board randomly w/o bias related to age, race, gender, socioeconomic status or Church affiliation.     Records: Reviewed to assist with preparation for the " office visit and are reflected throughout the note.    Medication:   Medication prescribed today    Requested Prescriptions     Pending Prescriptions Disp Refills     HYDROcodone-acetaminophen (NORCO )  mg per tablet 12/21/17 for use from 12/21-1/18 then 1/16/18 for use from 1/18-2/15 140 tablet 0     Sig: Take 1 tablet by mouth every 6 (six) hours as needed for pain (and 1 po prn max of 5 per day).     verapamil (CALAN) 80 MG tablet  1/22-4/22 180 tablet 0     Sig: Take 1 tablet (80 mg total) by mouth 2 (two) times a day.     TiZANidine (ZANAFLEX) 4 MG capsule 1/22-4/22 270 capsule 0     Sig: Take 1-3 capsules (4-12 mg total) by mouth at bedtime as needed for muscle spasms.     ketorolac (TORADOL) 10 mg tablet 14 tablet 1     Sig: Take 1 tablet (10 mg total) by mouth 2 (two) times a day as needed for pain (14/28 days).     FLUoxetine (PROZAC) 20 MG capsule  1/22-4/22 180 capsule 0     Sig: Take 1 capsule (20 mg total) by mouth 2 (two) times a day.       Check with your pharmacy that the prescriptions are on your profile. Call the pharmacy a week before you want to fill the prescription note the date the script was written may be assistive to the pharmacist. Access to opioids is decreasing as part of federal mandates due to concerns about the opioid epidemic. The pharmacy may need to order the medication for you. Ordering medication typically takes about a week. Please remind the pharmacist the opioid prescription was written on 12/28/2017.        SAFETY REMINDERS  No alcohol while taking controlled substances. Alcohol is not an illegal substance, it is unsafe to use in combination. It is a build up of substances in the body that can be extremely hazardous and may cause respirations to slow to a dangerous rate resulting in hospitalization, brain damage, or death.    Unintentional overdose and death.    People are not always in perfect health. Sometimes the body is weak or compromised because of an  illness like the flu, pneumonia, low bood etc. When the body struggles, even though a person is taking their medication as prescribed, the medication may be too much for the body to handle. Combinations of medication can also put a person at high risk. In one study it was noted that 80% of unintentional overdoses occurred in people who were taking a combination of opioids and benzodiazepines.    A big concern would be if someone else got your medication. Your medication is not prescribed to anyone other than you. Your medication could cause harm or death to someone else.    Naloxone is a rescue medication. A person may need the rescue medication if experiencing an unexpected overdose. The medication is meant to give a person a break by lifting off one burden, the opioid narcotic medication. Medical care is required because the a persons body is compromised and needs further testing and assistance.    Symptoms of overdose include:   !breathing slow and shallow, erratic or not at all  !pinpoint pupils, hallucinations  !confusion  !muscle jerks, slack muscles   !extreme sleepiness or loss of alertness   !awake but not able to talk   !face pale or clammy, vomiting, for lighter skinned people, the skin tone turns bluish purple, for darker skinned people, it turns grayish or ashen   If in a situation where overdose is a concern engage the emergency response system (dial 911).    Do not sell, loan, borrow or share your opioid medication with anyone. Deaths have occurred as a result of this practice. It is illegal and patients are being prosecuted.     Prevent unexpected access/loss of medication: Keep medication locked. Only carry what you need with you.      Patient Arrived @ 1026 for a 1120 appointment.     TT: 1106 - 1121  CT: over half spent in education and counseling as outlined in the plan.      Lola Talavera APRN [facility]  [address] [address]   [phone]  [phone]

## 2021-06-14 NOTE — TELEPHONE ENCOUNTER
Completed Chronic pain 1/20-2/17  Requested Prescriptions     Signed Prescriptions Disp Refills     HYDROcodone-acetaminophen (NORCO )  mg per tablet 40 tablet 0     Sig: Take 1 tablet by mouth every 4 (four) hours as needed for pain (max of 4/day and 40/28days).     Authorizing Provider: DANNI LYNCH

## 2021-06-14 NOTE — TELEPHONE ENCOUNTER
Medication being requested: hydrocodone  Last visit date:11/19/20      Provider:JUANA  Next visit date:1/26/21      Provider:JUANA  Expected follow up: end of january  MTM visit ([facility]) date- no    UDT 02/21/2020   CSA 11/05/2019   -  11/21/20, #40 tabs  Pertinent between visit information about requested medication (telephone, mychart, prior authorization, concerns, red flags, comments):   Patient is requesting this refill  Script being sent to provider by nurse- dates and quantity:  Requested Prescriptions     Pending Prescriptions Disp Refills     HYDROcodone-acetaminophen (NORCO )  mg per tablet 40 tablet 0     Sig: Take 1 tablet by mouth every 4 (four) hours as needed for pain (max of 4/day and 40/28days).     Date that the medication is expected to be refilled-  1/20/21  Not cued as a bridge, appears patient only taking this PRN  Pharmacy cued:  [facility]  Standing orders for withdrawal protocol implemented or requested- N/A

## 2021-06-14 NOTE — PROGRESS NOTES
Josy Valdez is a 55 y.o. female last evaluated 11/19/20. Is being evaluated for migraine.        Medical Record review and prep:  Start Time:0801  End Time: 0806  TT: 5    Visit Time:  Start Time: 1109  End Time: 1128  TT: 19    Wrap up:  Start Time: 1128  End Time: 1130  TT: 2      Visit Details  Type of Service: video Visit  Originating Location for the patient: home   Distant Location:  [facility]  Platform used: Med fusion.      Major issues:  1. Chronic migraine without aura with status migrainosus, not intractable      Patient Active Problem List   Diagnosis     Migraine     Myalgia     Plantar fasciitis, bilateral     Hip pain, chronic, left     Lumbago       HPI:  Headache: 4 mad HA in the last month.  Frequency of HA: daily, migraines about 1/week  Duration of HA: all day, constant, migraines will last 2days  Quality of HA: pulsing, throbbing    Location of HA: suboccipital w/ migraine then moves to the center of her head - neck pain,  Head - center of the head and back of the skull. Her neck will ache when she has a migraine  Severity of HA: 4/10  Aura description: peripheral twinkling; black dots with the migraine, dizziness (has not bother her for some time); she will get forgetful and she will yawn a lot before a HA  #of HA days per mo: daily baseline HA  Rehabilitation: She has completed Craniosacral therapy and we will monitor for any changes over time. Hx of PT. Self massage  Interventional: TPI; Botox injection (did not offer impact and is not considered to be a option)  Prophylactic treatment: verapmil , medical cannabis  Abortive Medication for HA: not a candidate r/t the CVA, medical cannabis  Treated: Norco and Toradol (assistive), Dark room, sleep, medical cannabis, OTC topical  Associated Mainfestiation of the migraine: Photophobia, watery eyes  Not tried acupuncture afraid of needles, not interested in chiropractic    Severity: Today: 4    Functional Symptoms: Pain  interferes with:  Sleep: no changes  Walking:    Ambulation/Transfer: Pt is ambulatory. Transfers independently.   Work / Social indicators for health:     Animal Care: dog  ADL's: she is getting things done. She is independent.  Additional social indicators for health:  Housing: secure  Concentration: no  Transportation:she is not driving much  Relationships/Social: Engaging in physical distancing. Reports feeling socially connected.    Impact of pain treatments:   Patient reports function has improved with current pain treatment: continued      Pain Plan of Care Review:   Medication:   Last opioid dose was Norco at 6am on 1/26/2021    Medication changes: no  Medication side/adverse effects: no  Aberrant behavior: no  Considerations: no      Current Outpatient Medications:      aspirin 81 mg chewable tablet, Chew 81 mg daily., Disp: , Rfl:      atorvastatin (LIPITOR) 20 MG tablet, Take 20 mg by mouth., Disp: , Rfl:      azelastine (ASTEPRO) 0.15 % (205.5 mcg) Spry nasal spray, Apply 205.5 mcg into each nostril 2 (two) times a day., Disp: , Rfl:      CHOLECALCIFEROL, VITAMIN D3, (VITAMIN D3 ORAL), Take by mouth., Disp: , Rfl:      DOCOSAHEXANOIC ACID/EPA (FISH OIL ORAL), Take by mouth., Disp: , Rfl:      fluoxetine HCl (PROZAC ORAL), Take 60 mg by mouth., Disp: , Rfl:      GARLIC ORAL, Take by mouth., Disp: , Rfl:      hydroCHLOROthiazide (HYDRODIURIL) 12.5 MG tablet, Take 12.5 mg by mouth daily., Disp: , Rfl:      HYDROcodone-acetaminophen (NORCO )  mg per tablet, Take 1 tablet by mouth every 4 (four) hours as needed for pain (max of 4/day and 40/28days)., Disp: 40 tablet, Rfl: 0    (1/20-2/17) HYDROcodone-acetaminophen (NORCO )  mg per tablet, Take 1 tablet by mouth every 4 (four) hours as needed for pain (max of 4/day and 40/28days)., Disp: 40 tablet, Rfl: 0 - continued     ketorolac (TORADOL) 10 mg tablet, Take 1 tablet (10 mg total) by mouth 2 (two) times a day as needed for  pain (max of 10/28 days)., Disp: 10 tablet, Rfl: 2 - rare use     L.ACID/L.CASEI/B.BIF/B.ERIC/FOS (PROBIOTIC BLEND ORAL), Take by mouth., Disp: , Rfl:      losartan (COZAAR) 50 MG tablet, Take 100 mg by mouth daily., Disp: , Rfl:      melatonin 3 mg TbER, Take 6 mg by mouth., Disp: , Rfl:      multivitamin capsule, Take 1 capsule by mouth daily., Disp: , Rfl:      omeprazole (PRILOSEC) 20 MG capsule, TAKE 1 CAPSULE BY MOUTH EVERY DAY, Disp: 30 capsule, Rfl: 1     pantoprazole (PROTONIX) 20 MG tablet, , Disp: , Rfl:      polyethylene glycol (GLYCOLAX) 17 gram/dose powder, , Disp: , Rfl:      prochlorperazine (COMPAZINE) 10 MG tablet, Take 1 tablet (10 mg total) by mouth every 6 (six) hours as needed for nausea., Disp: 30 tablet, Rfl: 2 - she will use this generally using 1-2 per migraine     TiZANidine (ZANAFLEX) 4 MG capsule, Take 3 capsules (12 mg total) by mouth at bedtime as needed for muscle spasms., Disp: 270 capsule, Rfl: 0 - continued     UNABLE TO FIND, Medical Cannabis - chronic intractable Pain - Migraine, Disp: , Rfl:      verapamiL (CALAN) 80 MG tablet, Take 1 tablet (80 mg total) by mouth 2 (two) times a day., Disp: 180 tablet, Rfl: - continued    :   1/26/2021 Reviewed to aid with decision regarding medication management  Narcotic= 291  Sedative= 120  Stimulant= 000  OD risk= 090    Last script:  Date: 1/22/21 due 2/19  Medication: hydrocodone  Dose: 10.325mg   Dispensed: 40   Prescriber: jania    Scheduled medication from other professionals: no      Expected dispensing: ok      Medical Cannabis:  Qualifying Condition: Chronic Intractable Pain - migrain HA of note not qualified for but getting benefit for IBS  Qualifying Provider: [facility] as of 8/28/20  [facility]  Email: [email or web address] (will transition her to [facility]    Transactions:  10/17    Products:  Suspension, vape, caps     Ratio:   Tangerine    Benefit (1- no benefit; 7- a great deal of benefits) from taking medical cannabis  "6  List  benefit(s) pain reduction, improved sleep, less anxiety    Negative effect (s) (1- no negative effects; 7- a great deal of negative effects) 2  List negative effect(s) dislikes the taste and texture, coughing with the vape  Specific negative effect(s)  Physical side effects: stomach upset, fatigue, headache, blurred vision no  Mental/cognitive side effects: mental clouding, confusion, depression no  Worsening of symptoms related to the condition being treated no    Difficulty/inconvenience in accessing medical cannabis increased dispensaries     Rehabilitation:  Home exercise program: treadmill      Objective:     Vitals:    01/26/21 1047   Weight: 205 lb (93 kg)   Height: 5' 5\" (1.651 m)   PainSc:   4   PainLoc: Head       4     Constitutional:  Pleasant and cooperative female who presents alone today.   Psychiatric: Mood and affect are appropriate for the situation, setting and topic of discussion.  Patient does not appear sedated.  Integumentary:  Observed skin WNL.   HEENT: EOM's grossly intact.    Chest: Breathing is non-labored.   Neurological:  Alert and oriented in all spheres including: time, place, person and situation.    Diagnostics:   Lab:  Notes recorded by Lola Talavera CNP on 3/2/2020 at 9:50 AM CST   Reviewed note 2/21/20 Last opioid dose was Hydrocodone last taken on 2/21/20 @ 10am, medical cannabis 2/20/20     UDT:   Detected mariajuana metabolite (on medical cannabis program); Detected hydrocodone and metabolites (expected)           Assessment:   Josy Valdez is a 55 y.o. female . Doximity video evaluation for migraine HA. Due to CVA she is not a candidate for triptans. Failed Botox. She has completed craniosacral therapy. She is doing a HEP from PT.   She started the medical cannabis program this has been going well. At this time she is not interested in the CGRP therapy as she feels stable.       She has done exceptionally well with the current combination of medication and " medical cannabis.      Continue video visits     *Universal Precautions:    UDT/Swab- 2/21/20  Consent-11/5/19  Agreement- 11/5/19  Pharmacy- as documented    Count- #4 remaining hydrocodone at appt 7/17/15   Psychological evaluation: DA 9/18/15  MME- 40  1/22/19 MME=10  03/21/2019 MME- 10   05/23/2019 MME- 10  8/29/20 MME up to 40 receiving 40pills/month  Pharmacogenetic testing- n/a  MTM: n/a.  Medical cannabis re-qualified on 8/28/20 [facility]    Management of opioid medication is inherently a moderate to high complex medial interaction based on the risk management required at each contact r/t risks and side effects.      Plan:   Thank you for participating in the doximity video visit - Here is the Plan of Care / NextSteps:     Contact [phone] to reserve a time. You need to follow up by: 3/18/21      [employer] assistance with Video Visits [phone].    Communicating with us:  Please call Monday-Friday for problems or questions - leave a message and one of the clinical support staff (CSS) will help to get things figured out. The number is [phone].     You may also opt to communicate through [employer].     Rehabilitation: Remain active continue your home exercises and stretches     Medication prescribed / to be continued:   Medication prescribed today:    Requested Prescriptions     Signed Prescriptions Disp Refills     HYDROcodone-acetaminophen (NORCO )  mg per tablet fill on 2/18; for use 2/19-3/19 40 tablet 0     Sig: Take 1 tablet by mouth every 4 (four) hours as needed for pain (max of 4/day and 40/28days).     verapamiL (CALAN) 80 MG tablet 180 tablet 0     Sig: Take 1 tablet (80 mg total) by mouth 2 (two) times a day.     TiZANidine (ZANAFLEX) 4 MG capsule 270 capsule 0     Sig: Take 3 capsules (12 mg total) by mouth at bedtime as needed for muscle spasms.     prochlorperazine (COMPAZINE) 10 MG tablet 30 tablet 0     Sig: Take 1 tablet (10 mg total) by mouth every 6 (six) hours as needed  for nausea.         REFILL INSTRUCTIONS:   Please contact your pharmacy and talk with your pharmacist for any refills of controlled substances. It will be beneficial to know the name of your medication, the date it was written ( 1/26/2021 ) and the date you are able to fill. Please remember the date filled and the date started in some cases are different. Please remember to use your medication during the dates of use.      Lola LOU FNP-BC  [address] [address]   [phone]  [phone]      Lola Talavera, CNP

## 2021-06-16 NOTE — TELEPHONE ENCOUNTER
Telephone Encounter by Khadra Beavers at 5/29/2019 10:36 AM     Author: Khadra Beavers Service: -- Author Type: --    Filed: 5/29/2019 10:37 AM Encounter Date: 5/23/2019 Status: Signed    : Khadra Beavers APPROVED:    Approval start date: 5/28/19  Approval end date: 5/28/2020    Pharmacy has been notified of approval and will contact patient when medication is ready for pickup.

## 2021-06-16 NOTE — PROGRESS NOTES
Subjective:   Josy Valdez is a 52 y.o. female who presents for evaluation of pain. See rooming evaluation. Patient was last seen 12/19/17     CC: Pain. Review of current status.     Major issues:  1. Chronic migraine without aura with status migrainosus, not intractable        Patient Active Problem List   Diagnosis     Migraine     Myalgia     Plantar fasciitis, bilateral     Hip pain, chronic, left     Lumbago       HPI: Questionnaires and records reviewed.    Location/Laterality of the pain: head  Severity: Today: 5/10   Since last visit pain scores: at best 3/10. at worst 8/10. on average 6/10  Quality: radiating  Timing: constant - daily  Aggravating factors: lck of sleep, bright lights, stress  Alleviating factors: more sleep, medication  Any New pain, injuries, falls: no  Since last visit pain has: not changed  Associated symptoms:    Numbness: -   Weakness: -   Bladder or Bowel loss of control: -   Night pain: +   Fever and/or Chills: -   Unexplained weight loss:-    Headache:  she is feeling stable. Migraines are a couple of times a week  Frequency of HA: daily    Duration of HA: all day, constant   Quality of HA: pulsing, throbbing    Location of HA: suboccipital w/ migraine then moves to the center of her head    Severity of HA: 5/10. Worse since I last saw her 9/10 (when issues with bowel  Aura description: peripheral twinkling; black dots with the migraine, dizziness (not bad)  #of HA days per mo: more than 15 per month    Rehabilitation: She has completed Craniosacral therapy and we will monitor for any changes over time. Hx of PT. Self massage  Interventional: TPI; Botox injection (did not offer impact and is not considered to be a option)  Prophylactic treatment: verapmil    Abortive Medication for HA: not a candidate r/t the CVA    Treated: Norco and Toradol (assistive), Dark room, sleep   Associated Mainfestiation of the migraine: Photophobia, watery eyes  Not tried acupuncture afraid of needles,  not interested in chiropractic.    Functional Symptoms: Pain interferes with:  Sleep: +  Walking: -  Work: +  ADL's: -  Relationships/Social: -  Sexual health: -    Impact of pain treatments:   Patient reports function has improved with current pain treatment: yes  Analgesia: fair   ADL's:  Household: Patient is able to participate in general chores. Social: Vacation trip to [place] to see her mother.    AE's: sleepy   Aberrant behavior: none    Activities Impaired by Increasing Pain Severity: F= 6  3-Enjoy  4-Work, Enjoy  5-Active, Mood Work Enjoy  6-Sleep, Active, Mood Work Enjoy  7-Walk, Sleep, Active, Mood Work Enjoy  8-Relate, Walk, Sleep, Active, Mood Work Enjoy      Pertinent Medical Hx/Safety:   Blood thinners: + asprin   Pregnant or wanting to become pregnant: -   New diagnostics since last visit: -   ED/UC visit since last visit: -   New treatment or New medical condition: -    Pain Plan of Care:   Medication:   Last opioid dose was Hydrocodone-acetaminophen 2/12/18 @ 1115 am    Medication changes: no  Medication side effects: no      Current Outpatient Prescriptions:      aspirin 81 mg chewable tablet, Chew 81 mg daily., Disp: , Rfl:      azelastine (ASTEPRO) 0.15 % (205.5 mcg) Spry nasal spray, Apply 205.5 mcg into each nostril 2 (two) times a day., Disp: , Rfl:      CHOLECALCIFEROL, VITAMIN D3, (VITAMIN D3 ORAL), Take by mouth., Disp: , Rfl:      DOCOSAHEXANOIC ACID/EPA (FISH OIL ORAL), Take by mouth., Disp: , Rfl:      FLUoxetine (PROZAC) 20 MG capsule, Take 1 capsule (20 mg total) by mouth 2 (two) times a day., Disp: 180 capsule, Rfl: 0     GARLIC ORAL, Take by mouth., Disp: , Rfl:      hydroCHLOROthiazide (HYDRODIURIL) 12.5 MG tablet, Take 12.5 mg by mouth daily., Disp: , Rfl:      HYDROcodone-acetaminophen (NORCO )  mg per tablet, Take 1 tablet by mouth every 6 (six) hours as needed for pain (and 1 po prn max of 5 per day)., Disp: 15 tablet, Rfl: 0 - continued has meds until 2/17 due to the  "incident with travel delay     ketorolac (TORADOL) 10 mg tablet, Take 1 tablet (10 mg total) by mouth 2 (two) times a day as needed for pain (14/28 days)., Disp: 14 tablet, Rfl: 1- would like a refill     L.ACID/L.CASEI/B.BIF/B.ERIC/FOS (PROBIOTIC BLEND ORAL), Take by mouth., Disp: , Rfl:      melatonin 3 mg TbER, Take 6 mg by mouth., Disp: , Rfl:      multivitamin capsule, Take 1 capsule by mouth daily., Disp: , Rfl:      omeprazole (PRILOSEC) 20 MG capsule, Take 1 capsule (20 mg total) by mouth daily., Disp: 30 capsule, Rfl: 0     polyethylene glycol (GLYCOLAX) 17 gram/dose powder, , Disp: , Rfl:      simvastatin (ZOCOR) 20 MG tablet, Take 20 mg by mouth bedtime., Disp: , Rfl:      TiZANidine (ZANAFLEX) 4 MG capsule, Take 1-3 capsules (4-12 mg total) by mouth at bedtime as needed for muscle spasms., Disp: 270 capsule, Rfl: 0- continued     verapamil (CALAN) 80 MG tablet, Take 1 tablet (80 mg total) by mouth 2 (two) times a day., Disp: 180 tablet, Rfl: 0- continued    Did not look any further into the medical cannabis program    Interventional: Knee injection w/ [facility] went well. Fel it was helpful was completed about 1 month ago      Review of Systems   Constitutional- + sleep disturbances, + activity intolerance  Musculoskeletal- + pain  Neuro- - cognitive changes  HEENT-  + headache  Psych-  - taking medication in a fashion other than prescribed    Social  No family history on file.  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   Psychiatric: Mood and affect are appropriate for the situation, setting and topic of " discussion.  Patient does not appear sedated.  Integumentary:  Observed skin WNL.   HEENT: EOM's grossly intact.    Chest: Breathing is non-labored.   Neurological:  Alert and oriented in all spheres including: time, place, person and situation.      Diagnostics:   Lab:  Last UDT on 12/19/17.  Reviewed 12/29/17. Detected hydrocodone and metabolites (expected)      Imaging:  No new imaging available to review    :  Dated 2/12/2018      Assessment:   Josy Valdez is a 52 y.o. female seen in clinic today for migraine HA. Due to CVA she is not a candidate for triptans. Failed Botox. She has completed craniosacral therapy. She is doing a HEP from PT. She has generally be stable.         *Universal Precautions:    UDS/Swab- UDS 8/17/16  Consent- 9/25/15  Agreement- 10/13/16    Pharmacy- as documented    -[place]NPMP reviewed online 1/9/17 - expected  Count- #4 remaining hydrocodone at appt 7/17/15   Psychological evaluation: DA 9/18/15  2/22/16 MME- 45mg  Pharmacogenetic testing- n/a  MTM: n/a    Management of opioid medication is inherently a moderate to high complex medial interaction based on the risk management required at each contact r/t risks and side effects.    Plan:   Plan/NextSteps:     Follow up: 2 months     Education:   Please call Monday-Friday for problems or questions and one of the clinical support staff (CSS) will help to get things figured out. The number is [phone]. Some folks are using Apprion to send an e-mail (Apprion message). Please remember some issues require an office visit.     Today we reviewed the plan of care and answered questions.      Records: Reviewed to assist with preparation for the office visit and are reflected throughout the note.    Health Maintenance: Please continue to see primary care for general medical prevention and illness care.    Diagnostics:   Last UDT on 12/19/17.  Reviewed 12/29/17. Detected hydrocodone and metabolites (expected)      Medication:   Medication  prescribed today:      ketorolac (TORADOL) 10 mg tablet 14 tablet 1     Sig: Take 1 tablet (10 mg total) by mouth 2 (two) times a day as needed for pain (14/28 days).     HYDROcodone-acetaminophen (NORCO )  mg per tablet  fill 2/16 for use from 2/17-3/17 then fill 3/16 for use from 3/17-4/14 140 tablet 0     Sig: Take 1 tablet by mouth every 6 (six) hours as needed for pain (and 1 po prn max of 5 per day).     Supplements:  Classic Hemp - [employer]   250mcg dose = 15 drops  Start with 5 drops if this does not feel like it is working then go to 10 drops and up to 15 drops  [employer]  Email: [email or web address]  Phone: [phone]  [employer]  [address] [place] [address] [address]  [place] [address]      Check with your pharmacy that the prescriptions are on your profile. Call the pharmacy a week before you want to fill the prescription note the date the script was written may be assistive to the pharmacist. Access to opioids is decreasing as part of federal mandates due to concerns about the opioid epidemic. The pharmacy may need to order the medication for you. Ordering medication typically takes about a week. Please remind the pharmacist the opioid prescription was written on 2/12/2018.    SAFETY REMINDERS  We need to be able to reach you. Please be certain we have a working telephone number.    No alcohol: Alcohol is unsafe to use in combination with the medications you are being prescribed.     Unintentional overdose and death.    People are not always in perfect health. Sometimes the body is weak or compromised because of an illness like the flu, pneumonia, low bood etc. When the body struggles, even though a person is taking their medication as prescribed, the medication may be too much for the body to handle.     Combinations of medication can put a person at high risk for an unintentional overdose. In one study it was noted that 80% of unintentional overdoses occurred in  people who were taking a combination of opioids and benzodiazepines.    A big concern would be if someone else got your medication. Your medication is not prescribed to anyone other than you. Your medication could cause harm or death to someone else. Do not sell, loan, borrow or share your medication with anyone. It is illegal.    Naloxone is a rescue medication. A person may need the rescue medication if experiencing an unexpected overdose. The medication is meant to give a person a break by lifting off one burden (the opioid narcotic medication). Medical care is required because the a persons body is compromised and needs further testing and assistance.    Symptoms of overdose include:   !breathing slow and shallow, erratic or not at all  !pinpoint pupils, hallucinations  !confusion  !muscle jerks, slack muscles   !extreme sleepiness or loss of alertness   !awake but not able to talk   !face pale or clammy, vomiting, for lighter skinned people, the skin tone turns bluish purple, for darker skinned people, it turns grayish or ashen   If in a situation where overdose is a concern engage the emergency response system (dial 911).    Prevent unexpected access/loss of medication: Keep medication locked. Only carry what you need with you.    Patient Arrived @ 1042 for a 1120 appointment.     TT: 2564- [phone]  CT: over half spent in education and counseling as outlined in the plan.      Lola LOU [facility]  [address] [facility] [address]   [phone]  [phone]

## 2021-06-16 NOTE — PATIENT INSTRUCTIONS - HE
Plan:   Thank you for participating in the doximity  visit - Here is the Plan of Care / NextSteps:     Contact [phone] to reserve a time. You need to follow up by: 3 months      [employer] assistance with Video Visits [phone].    Communicating with us:  Please call Monday-Friday for problems or questions - leave a message and one of the clinical support staff (CSS) will help to get things figured out. The number is [phone].     You may also opt to communicate through [employer].     Rehabilitation: Remain active      Diagnostic: Reviewed note 1/26/21 Last opioid dose was Norco at 6am on 1/26/2021. Pt is also on the medical cannabis program     UDT:  Detected mariajuana metabolite (on medical cannabis program); Detected hydrocodone and metabolites (expected)    Integrative: you are continuing the medical cannabis    Medication prescribed / to be continued: You have a script at the pharmacy for the hydrocodone.   Medication prescribed today:    Requested Prescriptions     Signed Prescriptions Disp Refills     TiZANidine (ZANAFLEX) 4 MG capsule 270 capsule 0     Sig: Take 3 capsules (12 mg total) by mouth at bedtime as needed for muscle spasms.     prochlorperazine (COMPAZINE) 10 MG tablet 30 tablet 0     Sig: Take 1 tablet (10 mg total) by mouth every 6 (six) hours as needed for nausea.     verapamiL (CALAN) 80 MG tablet 180 tablet 0     Sig: Take 1 tablet (80 mg total) by mouth 2 (two) times a day.

## 2021-06-16 NOTE — PROGRESS NOTES
Josy Valdez is a 56 y.o. female who is being evaluated via a billable video visit.      How would you like to obtain your AVS? Mail a copy.  If dropped from the video visit, the video invitation should be resent by: Text to cell phone: [phone]  Will anyone else be joining your video visit? No        Pain score: 4  Constant   What does your pain feel like: Stabbing in back of the head  Does the pain interfere with:  Work: N/A  Walking/distance: no  Sleep: no  Daily activities: no  Relationships/social life: no  Mood: yes  F= 4    CSA/ UDT 01/2021    Platform used for Video Visit: [employer]

## 2021-06-16 NOTE — PROGRESS NOTES
Josy Valdez is a 56 y.o. female last evaluated 1/26/21. Is being evaluated for migraine.        Medical Record review and prep:  Start Time: 0750  End Time: 0758  TT: 8    Visit Time:  Start Time: 1030  End Time: 1049  TT: 20    Wrap up:  Start Time:1630  End Time: 1633  TT: 3      Visit Details  Type of Service: video Visit  Originating Location for the patient: home   Distant Location:  [facility]  Platform used: [employer].      Major issues:  1. Chronic pain syndrome    2. Chronic migraine without aura with status migrainosus, not intractable    3. Intractable chronic migraine without aura and with status migrainosus    4. Nausea    5. Intractable chronic migraine without aura and without status migrainosus      Patient Active Problem List   Diagnosis     Migraine     Myalgia     Plantar fasciitis, bilateral     Hip pain, chronic, left     Lumbago       HPI:    HPI:  Headache: she indicates she has been a little worse this last month.  Frequency of HA: daily, migraines about 1/week  Duration of HA: all day, constant, migraines will last 2days  Quality of HA: pulsing, throbbing    Location of HA: suboccipital w/ migraine then moves to the center of her head - neck pain,  Head - center of the head and back of the skull. Her neck will ache when she has a migraine  Severity of HA: 4/10  Aura description: peripheral twinkling; black dots with the migraine, dizziness (has not bother her for some time); she will get forgetful and she will yawn a lot before a HA  #of HA days per mo: daily baseline HA  Rehabilitation: She has completed Craniosacral therapy and we will monitor for any changes over time. Hx of PT. Self massage  Interventional: TPI; Botox injection (did not offer impact and is not considered to be a option)  Prophylactic treatment: verapmil , medical cannabis  Abortive Medication for HA: not a candidate r/t the CVA, medical cannabis  Treated: Norco and Toradol (assistive), Dark  room, sleep, medical cannabis, OTC topical  Associated Mainfestiation of the migraine: Photophobia, watery eyes  Not tried acupuncture afraid of needles, not interested in chiropractic    Since last visit pain has: worse thinks related to her immunization      Functional Symptoms: Pain interferes with:  Sleep: not concerns about her sleep  Walking:    Ambulation/Transfer: Pt is ambulatory. Transfers independently.   Work / Social indicators for health:     Animal Care: dog   Family: her family is doing well  ADL's: independent    Toileting   Bathing: washing her hair more of a challenge   Dressing   Cooking   Housekeeping   Shopping does not like to go  Additional social indicators for health:  Housing: secure  Concentration: no concerns  Relationships/Social: Engaging in physical distancing. Reports feeling socially connected.    Impact of pain treatments:   Patient reports function has improved with current pain treatment: yes      Pain Plan of Care Review:   Medication:   Last opioid dose was Hydrocodone last taken 3/17/21 @6am    Medication changes: no  Medication side/adverse effects: no  Aberrant behavior: no  Considerations: no      Current Outpatient Medications:      aspirin 81 mg chewable tablet, Chew 81 mg daily., Disp: , Rfl:      atorvastatin (LIPITOR) 20 MG tablet, Take 20 mg by mouth., Disp: , Rfl:      CHOLECALCIFEROL, VITAMIN D3, (VITAMIN D3 ORAL), Take by mouth daily. , Disp: , Rfl:      DOCOSAHEXANOIC ACID/EPA (FISH OIL ORAL), Take by mouth., Disp: , Rfl:      fluoxetine HCl (PROZAC ORAL), Take 60 mg by mouth., Disp: , Rfl:      GARLIC ORAL, Take by mouth., Disp: , Rfl:      hydroCHLOROthiazide (HYDRODIURIL) 12.5 MG tablet, Take 12.5 mg by mouth daily., Disp: , Rfl:      (fill on 2/18; for use 2/19-3/19) HYDROcodone-acetaminophen (NORCO )  mg per tablet, Take 1 tablet by mouth every 4 (four) hours as needed for pain (max of 4/day and 40/28days)., Disp: 40 tablet, Rfl: 0 - she  is using more rarely     ketorolac (TORADOL) 10 mg tablet, Take 1 tablet (10 mg total) by mouth 2 (two) times a day as needed for pain (max of 10/28 days)., Disp: 10 tablet, Rfl: 2 - she has not been using much     L.ACID/L.CASEI/B.BIF/B.ERIC/FOS (PROBIOTIC BLEND ORAL), Take by mouth., Disp: , Rfl:      losartan (COZAAR) 50 MG tablet, Take 100 mg by mouth daily., Disp: , Rfl:      melatonin 3 mg TbER, Take 6 mg by mouth., Disp: , Rfl:      multivitamin capsule, Take 1 capsule by mouth daily., Disp: , Rfl:      pantoprazole (PROTONIX) 20 MG tablet, Take 20 mg by mouth daily. , Disp: , Rfl:      polyethylene glycol (GLYCOLAX) 17 gram/dose powder, Take 17 g by mouth daily as needed. , Disp: , Rfl:      prochlorperazine (COMPAZINE) 10 MG tablet, Take 1 tablet (10 mg total) by mouth every 6 (six) hours as needed for nausea., Disp: 30 tablet, Rfl: 0 - continued     TiZANidine (ZANAFLEX) 4 MG capsule, Take 3 capsules (12 mg total) by mouth at bedtime as needed for muscle spasms., Disp: 270 capsule, Rfl: 0 - continued     UNABLE TO FIND, Medical Cannabis - chronic intractable Pain - Migraine Oral solution: 2 mL by mouth once daily Capsules: 1 capsule by mouth at bedtime Vape; 1-4 puffs inhaled as needed, Disp: , Rfl: - continued      verapamiL (CALAN) 80 MG tablet, Take 1 tablet (80 mg total) by mouth 2 (two) times a day., Disp: 180 tablet, Rfl: 0 - continued    :   3/17/2021 Reviewed to aid with decision regarding medication management  Narcotic= 280  Sedative= 110  Stimulant= 000  OD risk= 090    Last script:  Date: 1/22/21 appears to have a script on file with the pharmacy  Medication: hydrocodone  Dose: 10/325mg   Dispensed: 40   Prescriber: jania    Scheduled medication from other professionals: no      Expected dispensing: ok - has used less opioid since the initiation of the medical canabis      Medical Cannabis: complete survey  Medical Cannabis:  Qualifying Condition: Chronic Intractable Pain - migrain HA of note  not qualified for but getting benefit for IBS  Qualifying Provider: Sadaf as of 8/28/20  [facility]  Email: [email or web address]     Transactions:  2/2020    Products:  Suspension  capsules  vape  topical    Ratios:  tangerine    Benefit (1- no benefit; 7- a great deal of benefitts) from taking medical cannabis6  List  benefit(s) reduced IBS, reduced pain, reduced anxiety, some impact on sleep, nausea improvement    Negative effect (s) (1- no negative effects; 7- a great deal of negative effects) 2  List negative effect(s) coughing, tiredness  Specific negative effect(s)  Physical side effects: stomach upset, fatigue, headache, blurred vision: denies  Mental/cognitive side effects: mental clouding, confusion, depression: denies  Worsening of symptoms related to the condition being treated: no    Difficulty/inconvenience in accessing medical cannabis needs more clinics around    Provider clinical observations regarding medical cannabis use in this patient:she does well with the program     Program improvements or additional information more locations, expansion of products, improved cost         Objective:     Vitals:    03/17/21 1007   PainSc:   4   PainLoc: Head       4(Stabbing in back of the head (F=4))     Constitutional:  Pleasant and cooperative female who presents alone today. She is dressed wearing make up.  Psychiatric: Mood and affect are appropriate for the situation, setting and topic of discussion.  Patient does not appear sedated.  Integumentary:  Observed skin WNL.   HEENT: EOM's grossly intact.    Chest: Breathing is non-labored.   Neurological:  Alert and oriented in all spheres including: time, place, person and situation.    Diagnostics:   Lab:  Last UDT on   Reviewed note 1/26/21 Last opioid dose was Norco at 6am on 1/26/2021. Pt is also on the medical cannabis program    UDT:  Detected mariajuana metabolite (on medical cannabis program); Detected hydrocodone and metabolites  (expected).      Imaging:  Imaging pulled forward today - not specifically reviewed    Reviewed older imaging today to aid with decision making.    Reviewed imaging today       Assessment:   Josy Valdez is a 56 y.o. female . Video evaluation for  migraine HA. Due to CVA she is not a candidate for triptans. Failed Botox. She has completed craniosacral therapy. She is doing a HEP from PT.   She started the medical cannabis program this has been going well. At this time she is not interested in the CGRP therapy as she feels stable.       She has done exceptionally well with the current combination of medication and medical cannabis. She does not always fill the hydrocodone monthly.     Continue video visits     *Universal Precautions:    UDT/Swab- 2/21/20  Consent-11/5/19  Agreement- 11/5/19  Pharmacy- as documented    Count- #4 remaining hydrocodone at appt 7/17/15   Psychological evaluation: DA 9/18/15  MME- 40  1/22/19 MME=10  03/21/2019 MME- 10   05/23/2019 MME- 10  8/29/20 MME up to 40 receiving 40pills/month  Pharmacogenetic testing- n/a  MTM: n/a.  Medical cannabis re-qualified on 8/28/20 [place]      Management of opioid medication is inherently a moderate to high complex medial interaction based on the risk management required at each contact r/t risks and side effects.      Plan:   Thank you for participating in the [facility]  visit - Here is the Plan of Care / NextSteps:     Contact [phone] to reserve a time. You need to follow up by: 3 months      [employer] assistance with Video Visits [phone].    Communicating with us:  Please call Monday-Friday for problems or questions - leave a message and one of the clinical support staff (CSS) will help to get things figured out. The number is [phone].     You may also opt to communicate through [employer].     Rehabilitation: Remain active      Diagnostic: Reviewed note 1/26/21 Last opioid dose was Norco at 6am on 1/26/2021. Pt is also on the medical  cannabis program     UDT:  Detected mariajuana metabolite (on medical cannabis program); Detected hydrocodone and metabolites (expected)    Integrative: you are continuing the medical cannabis    Medication prescribed / to be continued: You have a script at the pharmacy for the hydrocodone.   Medication prescribed today:    Requested Prescriptions     Signed Prescriptions Disp Refills     TiZANidine (ZANAFLEX) 4 MG capsule 270 capsule 0     Sig: Take 3 capsules (12 mg total) by mouth at bedtime as needed for muscle spasms.     prochlorperazine (COMPAZINE) 10 MG tablet 30 tablet 0     Sig: Take 1 tablet (10 mg total) by mouth every 6 (six) hours as needed for nausea.     verapamiL (CALAN) 80 MG tablet 180 tablet 0     Sig: Take 1 tablet (80 mg total) by mouth 2 (two) times a day.         Lola Talavera APRN FNP-BC  [address] [facility] [address]   [phone]  [phone]      Lola Talavera, CNP

## 2021-06-17 NOTE — TELEPHONE ENCOUNTER
Telephone Encounter by Ceci Rojo RN at 8/4/2020  9:51 AM     Author: Ceci Rojo RN Service: -- Author Type: Registered Nurse    Filed: 8/4/2020 10:00 AM Encounter Date: 8/4/2020 Status: Signed    : Ceci Rojo RN (Registered Nurse)       Medication being requested: Hydrocodone.    Last visit date: 6/11.  Provider: JUANA  Next visit date: 8/28.  Provider: JUANA  Expected follow up: 8 weeks  MTM visit ([facility]) date: 8 weeks  UDT date: 2/2020  Agreement date: 11/2019      Pertinent between visit information about requested medication (telephone, mychart, prior authorization, concerns, comments): none  Script being sent to provider by nurse- dates and quantity:   Requested Prescriptions     Pending Prescriptions Disp Refills   ? HYDROcodone-acetaminophen (NORCO )  mg per tablet 40 tablet 0     Sig: Take 1 tablet by mouth every 4 (four) hours as needed for pain (max of 4/day and 40/28days).     Pharmacy cued: [facility]  Standing orders for withdrawal protocol implemented: JAN

## 2021-06-17 NOTE — PROGRESS NOTES
Subjective:   Josy Valdez is a 53 y.o. female who presents for evaluation of pain. See rooming evaluation. Patient was last seen 2/12/18.     CC: Pain. Review of current status.     Major issues:  1. Intractable chronic migraine without aura and with status migrainosus    2. Chronic migraine without aura with status migrainosus, not intractable        Patient Active Problem List   Diagnosis     Migraine     Myalgia     Plantar fasciitis, bilateral     Hip pain, chronic, left     Lumbago       HPI: Questionnaires and records reviewed.    Location/Laterality of the pain: Left knee, head  Severity: Today: 5   Since last visit pain scores: at best 3. at worst 8. on average 5  Aggravating factors: stress and lack of sleep  Alleviating factors: sleep  Any New pain, injuries, falls: no  Since last visit pain has: no changed  Associated symptoms:   Numbness: -   Weakness: -   Bladder or Bowel loss of control: -   Night pain: +   Fever and/or Chills: -   Unexplained weight loss:-    Headache:    Frequency of HA: daily    Duration of HA: all day, constant   Quality of HA: pulsing, throbbing    Location of HA: suboccipital w/ migraine then moves to the center of her head    Severity of HA: 5/10. Worse since I last saw her 9/10 (when issues with bowel  Aura description: peripheral twinkling; black dots with the migraine, dizziness (not bad)  #of HA days per mo: more than 15 per month    Rehabilitation: She has completed Craniosacral therapy and we will monitor for any changes over time. Hx of PT. Self massage  Interventional: TPI; Botox injection (did not offer impact and is not considered to be a option)  Prophylactic treatment: verapmil    Abortive Medication for HA: not a candidate r/t the CVA    Treated: Norco and Toradol (assistive), Dark room, sleep   Associated Mainfestiation of the migraine: Photophobia, watery eyes  Not tried acupuncture afraid of needles, not interested in chiropractic    Functional Symptoms: Pain  interferes with:  Sleep: +  Walking: -  Work: -  ADL's: -  Relationships/Social: -  Sexual health: -    Impact of pain treatments:   Patient reports function has improved with current pain treatment: no      Activities Impaired by Increasing Pain Severity: F= 6  3-Enjoy  4-Work, Enjoy  5-Active, Mood Work Enjoy  6-Sleep, Active, Mood Work Enjoy  7-Walk, Sleep, Active, Mood Work Enjoy  8-Relate, Walk, Sleep, Active, Mood Work Enjoy    Mood related to pain:   Depressed: -   Angry: -   Frustrated: +   Anxious: -   Helpless/Hopeless: -    Pertinent Medical Hx/Safety:   Blood thinners: -   Pregnant or wanting to become pregnant: -   New diagnostics since last visit: -   ED/UC visit since last visit: --   New treatment or New medical condition: -    Pain Plan of Care:   Medication:   Last opioid dose was Hydrocodone last dose- 04/09/2018 @ 1030am    Current Outpatient Prescriptions:      aspirin 81 mg chewable tablet, Chew 81 mg daily., Disp: , Rfl:      azelastine (ASTEPRO) 0.15 % (205.5 mcg) Spry nasal spray, Apply 205.5 mcg into each nostril 2 (two) times a day., Disp: , Rfl:      CHOLECALCIFEROL, VITAMIN D3, (VITAMIN D3 ORAL), Take by mouth., Disp: , Rfl:      DOCOSAHEXANOIC ACID/EPA (FISH OIL ORAL), Take by mouth., Disp: , Rfl:      FLUoxetine (PROZAC) 20 MG capsule, Take 1 capsule (20 mg total) by mouth 2 (two) times a day., Disp: 180 capsule, Rfl: 0-continued     GARLIC ORAL, Take by mouth., Disp: , Rfl:      hydroCHLOROthiazide (HYDRODIURIL) 12.5 MG tablet, Take 12.5 mg by mouth daily., Disp: , Rfl:      HYDROcodone-acetaminophen (NORCO )  mg per tablet, Take 1 tablet by mouth every 6 (six) hours as needed for pain (and 1 po prn max of 5 per day)., Disp: 140 tablet, Rfl: 0 - continued she has been taking 5 per day     L.ACID/L.CASEI/B.BIF/B.ERIC/FOS (PROBIOTIC BLEND ORAL), Take by mouth., Disp: , Rfl:      losartan-hydrochlorothiazide (HYZAAR) 50-12.5 mg per tablet, Take 1 tablet by mouth., Disp: , Rfl:  "     melatonin 3 mg TbER, Take 6 mg by mouth., Disp: , Rfl:      multivitamin capsule, Take 1 capsule by mouth daily., Disp: , Rfl:      polyethylene glycol (GLYCOLAX) 17 gram/dose powder, , Disp: , Rfl:      simvastatin (ZOCOR) 20 MG tablet, Take 20 mg by mouth bedtime., Disp: , Rfl:      TiZANidine (ZANAFLEX) 4 MG capsule, TAKE 1-3 CAPSULES (4-12 MG TOTAL) BY MOUTH AT BEDTIME AS NEEDED FOR MUSCLE SPASMS., Disp: 270 capsule, Rfl: 0 - continued she is taking 3     verapamil (CALAN) 80 MG tablet, Take 1 tablet (80 mg total) by mouth 2 (two) times a day., Disp: 180 tablet, Rfl: 0 - continued     ketorolac (TORADOL) 10 mg tablet, Take 1 tablet (10 mg total) by mouth 2 (two) times a day as needed for pain (14/28 days)., Disp: 14 tablet, Rfl: 1 - continued      omeprazole (PRILOSEC) 20 MG capsule, Take 1 capsule (20 mg total) by mouth daily., Disp: 30 capsule, Rfl: 0 - continued    Supplements: She tried the CBD oil 15gtts BID she has not notice any changes. She indicates it is 5mcg /gtt we discussed dosing.     Interventional: Left knee injection is wearing off. She is intending to connect with [facility]      Review of Systems   Constitutional- + sleep disturbances, + activity intolerance  Musculoskeletal- + pain  Neuro- - cognitive changes  HEENT-  + headache  Psych-  - taking medication in a fashion other than prescribed       Social  No family history on file.  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   Psychiatric: Mood and affect are appropriate for the situation, setting and topic of discussion.  Patient does " not appear sedated.  Integumentary:  Observed skin WNL.   HEENT: EOM's grossly intact.    Chest: Breathing is non-labored.   Neurological:  Alert and oriented in all spheres including: time, place, person and situation.    Diagnostics:   Lab:  Last UDT on 12/19/17.  Reviewed 12/29/17. Detected hydrocodone and metabolites (expected    Imaging:  No new imaging available to review    :  Dated 4/9/2018      Assessment:   oJsy Valdez is a 53 y.o. female seen in clinic today for  migraine HA. Due to CVA she is not a candidate for triptans. Failed Botox. She has completed craniosacral therapy. She is doing a HEP from PT. Discussion r/t THC vs CBD. See plan below       *Universal Precautions:    UDS/Swab- UDS 8/17/16  Consent- 9/25/15  Agreement- 10/13/16    Pharmacy- as documented    -[place] reviewed online 1/9/17 - expected  Count- #4 remaining hydrocodone at appt 7/17/15   Psychological evaluation: DA 9/18/15  2/22/16 MME- 45mg  Pharmacogenetic testing- n/a  MTM: n/a    The patient has chronic pain and is being considered, initiated or sustained on a ER/LA opioid for pain symptoms severe enough to warrant around the clock care with an opioid medication.    Management of opioid medication is inherently a moderate to high complex medial interaction based on the risk management required at each contact r/t risks and side effects.    Plan:   Plan/NextSteps:     Follow up: 2 month      Education:   Please call Monday-Friday for problems or questions and one of the clinical support staff (CSS) will help to get things figured out. The number is [phone]. Some folks are using [email or web address] to send an e-mail ([email or web address] message). Please remember some issues require an office visit.     Today we reviewed the plan of care and answered questions.      Records: Reviewed to assist with preparation for the office visit and are reflected throughout the note.    Health Maintenance: Please continue to see primary care for general  medical prevention and illness care.    Rehabilitation: remain active    Medication:   Medication prescribed today:    Prescriptions Disp Refills     HYDROcodone-acetaminophen (NORCO )  mg per tablet 04/13/2018 for use from 4/14-5/12 140 tablet 0     Sig: Take 1 tablet by mouth every 4 (four) hours as needed for pain (max of 5/day and 140/28days).     ketorolac (TORADOL) 10 mg tablet 14 tablet 1     Sig: Take 1 tablet (10 mg total) by mouth 2 (two) times a day as needed for pain (14/28 days).     Prescriptions Disp Refills     verapamil (CALAN) 80 MG tablet 180 tablet 0     Sig: Take 1 tablet (80 mg total) by mouth 2 (two) times a day.     HYDROcodone-acetaminophen (NORCO )  mg per tablet 5/11 for use from 5/12-6/9 140 tablet 0     Sig: Take 1 tablet by mouth every 4 (four) hours as needed for pain (max of 5/day and 140/28days).     FLUoxetine (PROZAC) 20 MG capsule 180 capsule 0     Sig: Take 1 capsule (20 mg total) by mouth 2 (two) times a day.     TiZANidine (ZANAFLEX) 4 MG capsule 270 capsule 0     Sig: Take 3 capsules (12 mg total) by mouth at bedtime as needed for muscle spasms.     We discussed increasing the CBD to 250mcg twice per day and if you are taking this dose increasing to 250mcg three times a day. I have folks who are on mg doses so it may be too low at this point.     We also discussed ketamine as a means of controlling the headaches    REFILL INSTRUCTIONS:  Please contact the clinic refill line 7 days before your refill is due. Speak clearly; note cell phones cut in-and-out and poor quality speech and reception issues will influence our ability to hear you and be efficient with your prescription.     Call [phone] leave:   Your name (first and last w/ spelling)   Date of birth  Name of all the medication(s) being requested  Dose of the medication(s)   How you are taking the medication (eg. twice per day etc).     Contact your pharmacy 3 (three) days after leaving your  message to see if your prescription has been received. Please request the pharmacy check your profile to be certain about any concerns with a script failing to be received. Note: [facility] updates have been inconsistent.  If the script has not been received there may have been a problem with the communication please reach back out to the clinic.     SAFETY REMINDERS  We need to be able to reach you. Please be certain we have a working telephone number.    No alcohol: Alcohol is unsafe to use in combination with the medications you are being prescribed.     Unintentional overdose and death.    People are not always in perfect health. Sometimes the body is weak or compromised because of an illness like the flu, pneumonia, low bood etc. When the body struggles, even though a person is taking their medication as prescribed, the medication may be too much for the body to handle.     Combinations of medication can put a person at high risk for an unintentional overdose. In one study it was noted that 80% of unintentional overdoses occurred in people who were taking a combination of opioids and benzodiazepines.    A big concern would be if someone else got your medication. Your medication is not prescribed to anyone other than you. Your medication could cause harm or death to someone else. Do not sell, loan, borrow or share your medication with anyone. It is illegal.    Naloxone is a rescue medication. A person may need the rescue medication if experiencing an unexpected overdose. The medication is meant to give a person a break by lifting off one burden (the opioid narcotic medication). Medical care is required because the a persons body is compromised and needs further testing and assistance.    Symptoms of overdose include:   !breathing slow and shallow, erratic or not at all  !pinpoint pupils, hallucinations  !confusion  !muscle jerks, slack muscles   !extreme sleepiness or loss of alertness   !awake but not able to talk    !face pale or clammy, vomiting, for lighter skinned people, the skin tone turns bluish purple, for darker skinned people, it turns grayish or ashen   If in a situation where overdose is a concern engage the emergency response system (dial 911).    Prevent unexpected access/loss of medication: Keep medication locked. Only carry what you need with you.    Patient Arrived @ 1034 for a 1100 appointment.     TT: 5095 - 3430  CT: over half spent in education and counseling as outlined in the plan.      Lola Talavera APRN FNP-BC  [address] [facility] [address]   [phone]  [phone]

## 2021-06-18 NOTE — PROGRESS NOTES
Subjective:   Josy Valdez is a 53 y.o. female who presents for evaluation of pain. See rooming evaluation. Patient was last seen 4/9/18     CC: Pain. Review of current status.     Major issues:  1. Intractable chronic migraine without aura and with status migrainosus    2. Chronic migraine without aura with status migrainosus, not intractable    3. Gastroesophageal reflux disease without esophagitis      Patient Active Problem List   Diagnosis     Migraine     Myalgia     Plantar fasciitis, bilateral     Hip pain, chronic, left     Lumbago       HPI: Questionnaires and records reviewed.    Location/Laterality of the pain: mid back shoulder blade pain  Timing: persistent  Aggravating factors: sleeping wrong  Alleviating factors: time  Any New pain, injuries, falls: none. But struggling with increased dizziess  Since last visit pain has: not changed  Associated symptoms:    Numbness: -   Weakness: -   Bladder or Bowel loss of control: -   Night pain: +   Fever and/or Chills: -   Unexplained weight loss: -    Headache:    Frequency of HA: daily    Duration of HA: all day, constant   Quality of HA: pulsing, throbbing    Location of HA: suboccipital w/ migraine then moves to the center of her head    Severity of HA: 6/10  Aura description: peripheral twinkling; black dots with the migraine, dizziness (can have times when she is unable to get out of bed when dizzy)   #of HA days per mo: more than 15 per month    Rehabilitation: She has completed Craniosacral therapy and we will monitor for any changes over time. Hx of PT. Self massage  Interventional: TPI; Botox injection (did not offer impact and is not considered to be a option)  Prophylactic treatment: verapmil    Abortive Medication for HA: not a candidate r/t the CVA    Treated: Norco and Toradol (assistive), Dark room, sleep   Associated Mainfestiation of the migraine: Photophobia, watery eyes  Not tried acupuncture afraid of needles, not interested in  chiropractic    Functional Symptoms: Pain interferes with:  Sleep: +  Walking:    Ambulation/Transfer: Pt is ambulatory. Transfers independently.  ADL's: Independent   Bathing - dizziness when showering. She has her  around. BP is stable at this time. She has a hx of bilateral ear concerns and has not been to an ENT for some time. Notes the tube that was in the right ear cam out about 6 months ago.   Cooking   Dressing   Housekeeping   Toileting   Shopping  Relationships/Social: Patient is engaged with family and friends in a meaningful fashion.     Activities Impaired by Increasing Pain Severity: F= 6  3-Enjoy  4-Work, Enjoy  5-Active, Mood Work Enjoy  6-Sleep, Active, Mood Work Enjoy  7-Walk, Sleep, Active, Mood Work Enjoy  8-Relate, Walk, Sleep, Active, Mood Work Enjoy    Impact of pain treatments:   Patient reports function has improved with current pain treatment: yes      Pertinent Medical Hx/Safety:   Blood thinners: -   Pregnant or wanting to become pregnant: -   New diagnostics since last visit: -   ED/UC visit since last visit: -   New treatment or New medical condition: -    Pain Plan of Care Review:   Medication:   Last opioid dose was Hydrocodone last dose- 06/05/2018 @ 800am    Medication changes: none  Medication side/adverse effects: none  Aberrant behavior: none      Current Outpatient Prescriptions:      aspirin 81 mg chewable tablet, Chew 81 mg daily., Disp: , Rfl:      azelastine (ASTEPRO) 0.15 % (205.5 mcg) Spry nasal spray, Apply 205.5 mcg into each nostril 2 (two) times a day., Disp: , Rfl:      CHOLECALCIFEROL, VITAMIN D3, (VITAMIN D3 ORAL), Take by mouth., Disp: , Rfl:      DOCOSAHEXANOIC ACID/EPA (FISH OIL ORAL), Take by mouth., Disp: , Rfl:      FLUoxetine (PROZAC) 20 MG capsule, Take 1 capsule (20 mg total) by mouth 2 (two) times a day., Disp: 180 capsule, Rfl: 0 - continued     GARLIC ORAL, Take by mouth., Disp: , Rfl:      hydroCHLOROthiazide (HYDRODIURIL) 12.5 MG tablet, Take  12.5 mg by mouth daily., Disp: , Rfl:      HYDROcodone-acetaminophen (NORCO )  mg per tablet, Take 1 tablet by mouth every 4 (four) hours as needed for pain (max of 5/day and 140/28days)., Disp: 140 tablet, Rfl: 0 - continued     L.ACID/L.CASEI/B.BIF/B.ERIC/FOS (PROBIOTIC BLEND ORAL), Take by mouth., Disp: , Rfl:      losartan-hydrochlorothiazide (HYZAAR) 50-12.5 mg per tablet, Take 1 tablet by mouth., Disp: , Rfl:      melatonin 3 mg TbER, Take 6 mg by mouth., Disp: , Rfl:      multivitamin capsule, Take 1 capsule by mouth daily., Disp: , Rfl:      polyethylene glycol (GLYCOLAX) 17 gram/dose powder, , Disp: , Rfl:      simvastatin (ZOCOR) 20 MG tablet, Take 20 mg by mouth bedtime., Disp: , Rfl:      TiZANidine (ZANAFLEX) 4 MG capsule, Take 3 capsules (12 mg total) by mouth at bedtime as needed for muscle spasms., Disp: 270 capsule, Rfl: 0 - continued     verapamil (CALAN) 80 MG tablet, Take 1 tablet (80 mg total) by mouth 2 (two) times a day., Disp: 180 tablet, Rfl: 0- continued     ketorolac (TORADOL) 10 mg tablet, Take 1 tablet (10 mg total) by mouth 2 (two) times a day as needed for pain (14/28 days)., Disp: 14 tablet, Rfl: 1 - continued generally using 10 in 28 days     omeprazole (PRILOSEC) 20 MG capsule, Take 1 capsule (20 mg total) by mouth daily., Disp: 30 capsule, Rfl: 0 - continued discussed seing primary care b/c of the side effects with long term use.     Rehabilitation:   Home exercise program:+      Review of Systems   Constitutional- + sleep disturbances  Neuro- - cognitive changes  HEENT-  + headache  Psych-  - taking medication in a fashion other than prescribed    Social  No family history on file.  History   Smoking Status     Former Smoker   Smokeless Tobacco     Never Used     History   Alcohol Use No     Comment: in recovery     History   Drug Use No     History   Sexual Activity     Sexual activity: Not on file       Objective:     Vitals:    06/05/18 1055   BP: 135/84   Pulse: 89  "  Resp: 16   Weight: 220 lb (99.8 kg)   Height: 5' 5\" (1.651 m)   PainSc:   6       Constitutional:  Pleasant and cooperative female who presents alone today.   Psychiatric: Mood and affect are appropriate for the situation, setting and topic of discussion.  Patient does not appear sedated.  Integumentary:  Observed skin WNL.   HEENT: EOM's grossly intact.    Chest: Breathing is non-labored.   Neurological:  Alert and oriented in all spheres including: time, place, person and situation.      Diagnostics:   Lab:  Last UDT on 12/19/17.  Reviewed 12/29/17. Detected hydrocodone and metabolites (expected    Imaging:  No new imaging available to review    :  Dated 6/5/2018 - expected    Assessment:   Josy Valdez is a 53 y.o. female seen in clinic today for  migraine HA. Due to CVA she is not a candidate for triptans. Failed Botox. She has completed craniosacral therapy. She is doing a HEP from PT.  Struggling with some dizziness mostly when bathing. She is intending to see her ENT      *[place] Precautions:    UDS/Swab- UDS 8/17/16  Consent- 9/25/15  Agreement- 10/13/16    Pharmacy- as documented    -[place] reviewed online 1/9/17 - expected  Count- #4 remaining hydrocodone at appt 7/17/15   Psychological evaluation: DA 9/18/15  2/22/16 MME- 45mg  Pharmacogenetic testing- n/a  MTM: n/a    Management of opioid medication is inherently a moderate to high complex medial interaction based on the risk management required at each contact r/t risks and side effects.    Plan:   Plan/NextSteps:     Follow up: 8 weks      Education:   Please call Monday-Friday for problems or questions and one of the clinical support staff (CSS) will help to get things figured out. The number is [phone]. Some folks are using [employer] to send an e-mail (AP[employer] message). Please remember some issues require an office visit.     Today we reviewed the plan of care and answered questions.      Records: Reviewed to assist with preparation " for the office visit and are reflected throughout the note.    Health Maintenance: Please continue to see primary care for general medical prevention and illness care.    Rehabilitation: remain active     Consultation/Specialists: I understand you will connect with your ENT about the dizziness    Medication:   Medication prescribed today:  Prescriptions Disp Refills     TiZANidine (ZANAFLEX) 4 MG capsule 270 capsule 0     Sig: Take 3 capsules (12 mg total) by mouth at bedtime as needed for muscle spasms.     verapamil (CALAN) 80 MG tablet 180 tablet 0     Sig: Take 1 tablet (80 mg total) by mouth 2 (two) times a day.     FLUoxetine (PROZAC) 20 MG capsule 180 capsule 0     Sig: Take 1 capsule (20 mg total) by mouth 2 (two) times a day.     HYDROcodone-acetaminophen (NORCO )  mg per tablet fill 6/8 for use from 6/9-7/7 then 07/06/18 for use 7/7-8/4 140 tablet 0     Sig: Take 1 tablet by mouth every 4 (four) hours as needed for pain (max of 5/day and 140/28days).     omeprazole (PRILOSEC) 20 MG capsule 30 capsule 0     Sig: Take 1 capsule (20 mg total) by mouth daily.     ketorolac (TORADOL) 10 mg tablet 10 tablet 1     Sig: Take 1 tablet (10 mg total) by mouth 2 (two) times a day as needed for pain (14/28 days).       I would like you to talk with your primary care doctor about the GERD and omeprazole.I would prefer if you were using this medication only when taking the ketorolac.    REFILL INSTRUCTIONS: Contact your pharmacy and talk with your pharmacist about any government Controlled/Scheduled medications 3 (three) days after leaving your message to see if your prescription has been received. Please request the pharmacist check your profile to be certain about any concerns with a script failing to be received. Note: [facility] updates have been inconsistent.  If the script has not been received there may have been a problem with the communication please reach back out to the clinic.         SAFETY  REMINDERS  We need to be able to reach you. Please be certain we have a working telephone number.    No alcohol: Alcohol is unsafe to use in combination with the medications you are being prescribed.     Unintentional overdose and death.    People are not always in perfect health. Sometimes the body is weak or compromised because of an illness like the flu, pneumonia, low bood etc. When the body struggles, even though a person is taking their medication as prescribed, the medication may be too much for the body to handle.     Combinations of medication can put a person at high risk for an unintentional overdose. In one study it was noted that 80% of unintentional overdoses occurred in people who were taking a combination of opioids and benzodiazepines.    A big concern would be if someone else got your medication. Your medication is not prescribed to anyone other than you. Your medication could cause harm or death to someone else. Do not sell, loan, borrow or share your medication with anyone. It is illegal.    Naloxone is a rescue medication. A person may need the rescue medication if experiencing an unexpected overdose. The medication is meant to give a person a break by lifting off one burden (the opioid narcotic medication). Medical care is required because the a persons body is compromised and needs further testing and assistance.    Symptoms of overdose include:   !breathing slow and shallow, erratic or not at all  !pinpoint pupils, hallucinations  !confusion  !muscle jerks, slack muscles   !extreme sleepiness or loss of alertness   !awake but not able to talk   !face pale or clammy, vomiting, for lighter skinned people, the skin tone turns bluish purple, for darker skinned people, it turns grayish or ashen   If in a situation where overdose is a concern engage the emergency response system (dial 911).    Prevent unexpected access/loss of medication: Keep medication locked. Only carry what you need with you  ideally in a labeled bottle.    Patient Arrived @ 1046 for a 1120 appointment.     TT: 112 - 1145  CT: over half spent in education and counseling as outlined in the plan.      Lola LOU FNP-BC  [address] [facility] [address]   [phone]  [phone]

## 2021-06-19 NOTE — PROGRESS NOTES
Subjective:   Josy Valdez is a 53 y.o. female who presents for evaluation of pain. Reviewed the rooming evaluation. Patient was last seen 6/5/18.     CC: Pain. Review of current status.     Major issues:  1. Chronic migraine without aura with status migrainosus, not intractable      Patient Active Problem List   Diagnosis     Migraine     Myalgia     Plantar fasciitis, bilateral     Hip pain, chronic, left     Lumbago       HPI: Questionnaires and records reviewed with the patient today.    Location/Laterality of the pain: bilateral knee  Severity: Today: 4  Quality: sore  Timing: intermittent  Aggravating factors: wearing off of the injection, steps being up and down  Alleviating factors: injections have offered assistance in the past.  Any New pain, injuries, fallsno  Since last visit pain has: not changed  Associated symptoms:    Night pain - her pain will wake her      Headache:    Frequency of HA: daily    Duration of HA: all day, constant   Quality of HA: pulsing, throbbing    Location of HA: suboccipital w/ migraine then moves to the center of her head    Severity of HA: 5 can get or have very bad days that will last several days in a row  Aura description: peripheral twinkling; black dots with the migraine, dizziness (this has been better)  #of HA days per mo: more than 15 per month    Rehabilitation: She has completed Craniosacral therapy and we will monitor for any changes over time. Hx of PT. Self massage  Interventional: TPI; Botox injection (did not offer impact and is not considered to be a option)  Prophylactic treatment: verapmil    Abortive Medication for HA: not a candidate r/t the CVA    Treated: Norco and Toradol (assistive), Dark room, sleep   Associated Mainfestiation of the migraine: Photophobia, watery eyes  Not tried acupuncture afraid of needles, not interested in chiropractic     Functional Symptoms: Pain interferes with:  Sleep: + she has an unusual sleep pattern however this is working  for her and she is not fatigued and she is able to engage  Walking:    Ambulation/Transfer: Pt is ambulatory. Transfers independently.  ADL's: independent   Bathing - she has not had any further dizzy spells in the shower   Cooking   Dressing   Housekeeping   Toileting   Shopping  Relationships/Social: Patient is engaged with family and friends in a meaningful fashion.     Activities Impaired by Increasing Pain Severity: F= 6  3-Enjoy  4-Work, Enjoy  5-Active, Mood Work Enjoy  6-Sleep, Active, Mood Work Enjoy  7-Walk, Sleep, Active, Mood Work Enjoy  8-Relate, Walk, Sleep, Active, Mood Work Enjoy    Impact of pain treatments:   Patient reports function has improved with current pain treatment: yes    Mood related to pain:   Frustrated:     Pertinent Medical Hx/Safety:   Blood thinners: -   Pregnant or wanting to become pregnant: -   New diagnostics since last visit: -   ED/UC visit since last visit: -   New treatment or New medical condition: -    Pain Plan of Care Review:   Medication:   Last opioid dose was Hydrocodone last dose- 07/31/2018 @ 1100am    Medication changes: interested in looking at the medical cannabis program  Medication side/adverse effects: sleepiness  Aberrant behavior: none      Current Outpatient Prescriptions:      aspirin 81 mg chewable tablet, Chew 81 mg daily., Disp: , Rfl:      azelastine (ASTEPRO) 0.15 % (205.5 mcg) Spry nasal spray, Apply 205.5 mcg into each nostril 2 (two) times a day., Disp: , Rfl:      CHOLECALCIFEROL, VITAMIN D3, (VITAMIN D3 ORAL), Take by mouth., Disp: , Rfl:      DOCOSAHEXANOIC ACID/EPA (FISH OIL ORAL), Take by mouth., Disp: , Rfl:      FLUoxetine (PROZAC) 20 MG capsule, Take 1 capsule (20 mg total) by mouth 2 (two) times a day., Disp: 180 capsule, Rfl: 0 - continued     GARLIC ORAL, Take by mouth., Disp: , Rfl:      hydroCHLOROthiazide (HYDRODIURIL) 12.5 MG tablet, Take 12.5 mg by mouth daily., Disp: , Rfl:      HYDROcodone-acetaminophen (NORCO )  mg  per tablet, Take 1 tablet by mouth every 4 (four) hours as needed for pain (max of 5/day and 140/28days)., Disp: 140 tablet, Rfl: 0 - continued. She indicates this has been effective but it is not working in the same fashion      L.ACID/L.CASEI/B.BIF/B.ERIC/FOS (PROBIOTIC BLEND ORAL), Take by mouth., Disp: , Rfl:      losartan-hydrochlorothiazide (HYZAAR) 50-12.5 mg per tablet, Take 1 tablet by mouth., Disp: , Rfl:      melatonin 3 mg TbER, Take 6 mg by mouth., Disp: , Rfl:      multivitamin capsule, Take 1 capsule by mouth daily., Disp: , Rfl:      omeprazole (PRILOSEC) 20 MG capsule, TAKE 1 CAPSULE BY MOUTH EVERY DAY, Disp: 30 capsule, Rfl: 1     polyethylene glycol (GLYCOLAX) 17 gram/dose powder, , Disp: , Rfl:      simvastatin (ZOCOR) 20 MG tablet, Take 20 mg by mouth bedtime., Disp: , Rfl:      TiZANidine (ZANAFLEX) 4 MG capsule, Take 3 capsules (12 mg total) by mouth at bedtime as needed for muscle spasms., Disp: 270 capsule, Rfl: 0 - continued     verapamil (CALAN) 80 MG tablet, Take 1 tablet (80 mg total) by mouth 2 (two) times a day., Disp: 180 tablet, Rfl: 0 - continued     ketorolac (TORADOL) 10 mg tablet, Take 1 tablet (10 mg total) by mouth 2 (two) times a day as needed for pain (14/28 days)., Disp: 10 tablet, Rfl: 1 - contineud    Supplement:  CBD oil is assistive for the HA she is using 15gtts qid    Rehabilitation:   Home exercise program: + She has been walking and gardening (she does not love to garden)    Review of Systems   Constitutional- + sleep disturbances, some activity intolerance  Musculoskeletal-+ pain  Neuro- - cognitive changes, + central  HEENT-  + headache  Psych- - taking medication in a fashion other than prescribed    Social  No family history on file.  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   Psychiatric: Mood and affect are appropriate for the situation, setting and topic of discussion.  Patient does not appear sedated.  Integumentary:  Observed skin WNL.   HEENT: EOM's grossly intact.    Chest: Breathing is non-labored.   Neurological:  Alert and oriented in all spheres including: time, place, person and situation.    Diagnostics:   Lab:  Last UDT on 12/19/17.  Reviewed 12/29/17. Detected hydrocodone and metabolites (expected)     Imaging:  Imaging pulled forward today - not specifically reviewed    Reviewed older imaging today to aid with decision making.    Reviewed imaging today     :  Dated 7/31/2018 reviewed to aid with decision regarding medication management      Assessment:   Josy Valdez is a 53 y.o. female seen in clinic today for migraine HA. Due to CVA she is not a candidate for triptans. Failed Botox. She has completed craniosacral therapy. She is doing a HEP from PT.  She has been managed with medication and CBD oil. She is interested in being considered for the medical cannabis program.      *Universal Precautions:    UDS/Swab- UDS 8/17/16  Consent- 9/25/15  Agreement- 10/13/16    Pharmacy- as documented    -[place] reviewed [place] 1/9/17 - expected  Count- #4 remaining hydrocodone at appt 7/17/15   Psychological evaluation: DA 9/18/15  2/22/16 MME- 45mg  Pharmacogenetic testing- n/a  MTM: n/a    Management of opioid medication is inherently a moderate to high complex medial interaction based on the risk management required at each contact r/t risks and side effects.    Plan:   Plan of Care / NextSteps:     Follow up: 8 weeks    Education:   Please call Monday-Friday for problems or questions and one of the clinical support staff (CSS) will help to " get things figured out. The number is [phone].     [employer] is a means to send an e-mail ([id number] message) to communicate any concerns.     Please remember some issues require an office visit.     Today we reviewed the plan of care and answered questions.      Records: Reviewed to assist with preparation for the office visit and are reflected throughout the note.    Primary Care: You need to have an annual physical and check in with your primary care folks on a regular basis. Talk with your primary care about the frequency of expected visits.     Rehabilitation: remain active    Interventional: I understand you are considering injections for your knees. You have done with with your primary care doc in the past.       Medication:   Medication prescribed today:    Requested Prescriptions     Pending Prescriptions Disp Refills     HYDROcodone-acetaminophen (NORCO )  mg per tablet  08/03/2018 for use 08/04-09/01 then 08/31/2018 for use 09/01-9/29 140 tablet 0     Sig: Take 1 tablet by mouth every 4 (four) hours as needed for pain (max of 5/day and 140/28days).     I will look into the medical cannabis.    We also discussed consideration of ketamine.    REFILL INSTRUCTIONS:   Please contact your pharmacy and talk with your pharmacist for any refills of controlled substances. It will be beneficial to know the name of your medication, the date it was written ( 7/31/2018 ) the date you are able to fill. Please remember the date filled and the date started in some cases is different. Please remember to use your medication during the dates of use.      SAFETY REMINDER  We need to be able to reach you. Please be certain we have a working telephone number. If we are unable to reach you we may not be able to continue to prescribe opioid medication.    FAQ  Q. Why is alcohol consumption a concern with opioid medication?  A. Opioids suppress centers in the brain responsible for breathing. Generally a little  "decrease in drive to breathe is manageable. Alcohol enhances the suppression and can add to your lack of drive to breathe. Therefore it is unsafe to consume alcohol when you are using opioids. Opioids and alcohol are detectable in your urine for several days so if it is seen on a urine screen then they are used together and this is unsafe.     Q. I live with chronic pain and take my medication like it is prescribed why am I at risk for an overdose?  A. Opioids suppress centers in the brain responsible for breathing. Generally a little decrease in drive to breathe is manageable. When folks become ill like with pneumonia the addition of lungs that are struggling and may not absorb oxygen well can add an additional problem and lead to an overdose. If you are running a fever medication may be absorbed differently. Nausea and vomiting has led to folks \"losing\" medication - additional dosing because we do not know how much may have been absorbed can lead to an overdose. As we age general health changes occure and this can lead to increased issues with clearing medication from the liver or kidneys increasing the risk of an overdose.    Q. I have been using benzodiazepines for years with my opioids what happened that they should not be used together any more?  A. Combinations of medication can put a person at high risk for an unintentional overdose. In one study it was noted that 80% of unintentional overdoses occurred in people who were taking a combination of opioids and benzodiazepines.    Q. I hear about Naloxone and I understand is a medicine that can be used if there is a concern about an overdose, should I have it available at home?  A. Anyone with an MME over 50 or who uses combinations of medication that enhance the effects of an opioid is a good candidate for Naloxone. If you do not have Naloxone ask me we can talk about it together.    Q. Why should I have a safe?  A. A big concern would be if someone else got " your medication. Your medication is not prescribed to anyone other than you. You assume the responsibility of harm or death another person should someone else use your medication. Do not sell, loan, borrow or share your medication with anyone. It is illegal.     Q. What does an overdose look like?  A. Symptoms of overdose include:   !breathing slow and shallow, erratic or not at all  !pinpoint pupils, hallucinations  !confusion  !muscle jerks, slack muscles   !extreme sleepiness or loss of alertness   !awake but not able to talk   !face pale or clammy, vomiting, for lighter skinned people, the skin tone turns bluish purple, for darker skinned people, it turns grayish or ashen   If in a situation where overdose is a concern engage the emergency response system (dial 911).      Patient Arrived @ 1249 for a 1320 appointment.     TT: 1320 - 134  CT: over half spent in education and counseling reviewing status and plan per HPI, recent interventions and options, medication - risk, benefit, considerations, safety.    Lola Talavera APRN FNP-BC  [address] [facility] [address]   [phone]  [phone]
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
## 2021-06-20 NOTE — PROGRESS NOTES
Subjective:   Josy Valdez is a 53 y.o. female who presents for evaluation of pain. Reviewed the rooming evaluation. Patient was last seen 8/22/18.     CC: Pain. Review of current status.     Major issues:  1. Chronic migraine without aura with status migrainosus, not intractable    2. Intractable chronic migraine without aura and with status migrainosus      Patient Active Problem List   Diagnosis     Migraine     Myalgia     Plantar fasciitis, bilateral     Hip pain, chronic, left     Lumbago       HPI: Questionnaires and records reviewed with the patient today.    Headache:    Frequency of HA: daily    Duration of HA: all day, constant   Quality of HA: pulsing, throbbing    Location of HA: suboccipital w/ migraine then moves to the center of her head    Severity of HA: today 4 best 3, worst 8 average 5  Aura description: peripheral twinkling; black dots with the migraine, dizziness (this has been better)  #of HA days per mo: more than 15 per month    Rehabilitation: She has completed Craniosacral therapy and we will monitor for any changes over time. Hx of PT. Self massage  Interventional: TPI; Botox injection (did not offer impact and is not considered to be a option)  Prophylactic treatment: verapmil    Abortive Medication for HA: not a candidate r/t the CVA    Treated: Norco and Toradol (assistive), Dark room, sleep   Associated Mainfestiation of the migraine: Photophobia, watery eyes  Not tried acupuncture afraid of needles, not interested in chiropractic     denies numbness, weakness, b/b loss of control, fever/chills or unexplained weight loss. She reports pain at night    Functional Symptoms: Pain interferes with:  Sleep: +  Walking:    Ambulation/Transfer: Pt is ambulatory. Transfers independently.  ADL's: independent:   Bathing   Cooking   Dressing   Housekeeping   Toileting   Shopping  Relationships/Social: Patient is engaged with family and friends in a meaningful fashion.     Activities Impaired by  Increasing Pain Severity: F= 6  3-Enjoy  4-Work, Enjoy  5-Active, Mood Work Enjoy  6-Sleep, Active, Mood Work Enjoy  7-Walk, Sleep, Active, Mood Work Enjoy  8-Relate, Walk, Sleep, Active, Mood Work Enjoy    Impact of pain treatments:   Patient reports function has improved with current pain treatment: yes    Mood related to pain:   Depressed: -   Angry: -   Frustrated: -   Anxious: + may see some improvement with medical cannabis   Helpless/Hopeless: -    Pertinent Medical Hx/Safety:   Blood thinners: -   Pregnant or wanting to become pregnant: -   New diagnostics since last visit: -   ED/UC visit since last visit: -   New treatment or New medical condition: +started the medical cannabis    Pain Plan of Care Review:   Medication:   Last opioid dose was Hydrocodone last dose- 09/25/2018 @ 1000am    Medication changes: Medical cannabis- pt reports her  noted she is more active and engaged. She has noticed she is doing more around the house and is a bit more talkative. She does feel her pain and HA are better.  Medication side/adverse effects: sleepy  Aberrant behavior: none      Current Outpatient Prescriptions:      aspirin 81 mg chewable tablet, Chew 81 mg daily., Disp: , Rfl:      azelastine (ASTEPRO) 0.15 % (205.5 mcg) Spry nasal spray, Apply 205.5 mcg into each nostril 2 (two) times a day., Disp: , Rfl:      CHOLECALCIFEROL, VITAMIN D3, (VITAMIN D3 ORAL), Take by mouth., Disp: , Rfl:      DOCOSAHEXANOIC ACID/EPA (FISH OIL ORAL), Take by mouth., Disp: , Rfl:      FLUoxetine (PROZAC) 20 MG capsule, Take 1 capsule (20 mg total) by mouth 2 (two) times a day., Disp: 180 capsule, Rfl: 0- continued     GARLIC ORAL, Take by mouth., Disp: , Rfl:      hydroCHLOROthiazide (HYDRODIURIL) 12.5 MG tablet, Take 12.5 mg by mouth daily., Disp: , Rfl:      HYDROcodone-acetaminophen (NORCO )  mg per tablet, Take 1 tablet by mouth every 4 (four) hours as needed for pain (max of 5/day and 140/28days)., Disp: 140  tablet, Rfl: 0 - she has not noticed a decrease in her hydrocodone to date. She is beginning to believe she would make a reduction.     L.ACID/L.CASEI/B.BIF/B.ERIC/FOS (PROBIOTIC BLEND ORAL), Take by mouth., Disp: , Rfl:      losartan-hydrochlorothiazide (HYZAAR) 50-12.5 mg per tablet, Take 1 tablet by mouth., Disp: , Rfl:      melatonin 3 mg TbER, Take 6 mg by mouth., Disp: , Rfl:      multivitamin capsule, Take 1 capsule by mouth daily., Disp: , Rfl:      omeprazole (PRILOSEC) 20 MG capsule, TAKE 1 CAPSULE BY MOUTH EVERY DAY, Disp: 30 capsule, Rfl: 1     polyethylene glycol (GLYCOLAX) 17 gram/dose powder, , Disp: , Rfl:      simvastatin (ZOCOR) 20 MG tablet, Take 20 mg by mouth bedtime., Disp: , Rfl:      TiZANidine (ZANAFLEX) 4 MG capsule, Take 3 capsules (12 mg total) by mouth at bedtime as needed for muscle spasms., Disp: 270 capsule, Rfl: 0 - continued     UNABLE TO FIND, Medical Cannabis, Disp: , Rfl: - this is continuing to offer benefit Qualified by [facility]     verapamil (CALAN) 80 MG tablet, Take 1 tablet (80 mg total) by mouth 2 (two) times a day., Disp: 180 tablet, Rfl: 0 - continued discussed concern of medication interaction with the simvastatin     ketorolac (TORADOL) 10 mg tablet, Take 1 tablet (10 mg total) by mouth 2 (two) times a day as needed for pain., Disp: 10 tablet, Rfl: 0 - not needed lately      Review of Systems   Constitutional- + sleep disturbances, - activity intolerance  Musculoskeletal- + pain  Neuro- - cognitive changes  HEENT-  + headache  Psych-  - taking medication in a fashion other than prescribed    Social  No family history on file.  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   Psychiatric: Mood and affect are appropriate for the situation, setting and topic of discussion.  Patient does not appear sedated.  Integumentary:  Observed skin WNL.  HEENT: EOM's grossly intact.    Chest: Breathing is non-labored.   Neurological:  Alert and oriented in all spheres including: time, place, person and situation.    Diagnostics:   Lab:  Last UDT on 12/19/17.  Reviewed 12/29/17. Detected hydrocodone and metabolites (expected)     :  Dated 9/25/2018 reviewed to aid with decision regarding medication management      Assessment:   Josy Valdez is a 53 y.o. female seen in clinic today for migraine HA. Due to CVA she is not a candidate for triptans. Failed Botox. She has completed craniosacral therapy. She is doing a HEP from PT.  She has been managed with medication and CBD oil. She started the medical cannabis program. Discussed lowering the opioid and the strategy for lowering      *Universal Precautions:    UDT/Swab- 12/19/17  Consent- 9/25/15  Agreement- 10/24/17  Pharmacy- as documented    Count- #4 remaining hydrocodone at appt 7/17/15   Psychological evaluation: DA 9/18/15  MME-   Pharmacogenetic testing- n/a  MTM: n/a.    Management of opioid medication is inherently a moderate to high complex medial interaction based on the risk management required at each contact r/t risks and side effects.    Plan:   Plan of Care / NextSteps:     Follow up: follow up with Tricia as planned for review of the medical cannabis. F/U with me for the review of the remaining medication.    Education:   Please call Monday-Friday for problems or questions and one of the clinical support staff (CSS) will help to get things figured out. The number is [phone].     "[employer] is a means to send an e-mail (Wheeb[employer] message) to communicate any concerns.     Please remember some issues require an office visit. "     Today we reviewed the plan of care and answered questions.      Records: Reviewed to assist with preparation for the office visit and are reflected throughout the note.    Primary Care: Please communicate with your doctor about the verapamil and simvastatin interaction concern. We discussed stopping the Verapamil but it is noted this may be aiding your blood pressure in addition to the headaches. You are not certain of the impact of the simvastatin and were interested in talking with your primary.    Rehabilitation: remain active    Medication:   Medication prescribed today:  Prescriptions Disp Refills     HYDROcodone-acetaminophen (NORCO )  mg per tablet Please fill 09/28/2018 for use 09/29-10/27 then Please fill 10/26/18 for use 10/27-11/24 - overall dose reduction completed 112 tablet 0     Sig: Take 1 tablet by mouth every 4 (four) hours as needed for pain (max of 4/day and 140/28days).     FLUoxetine (PROZAC) 20 MG capsule 180 capsule 0     Sig: Take 1 capsule (20 mg total) by mouth 2 (two) times a day.     verapamil (CALAN) 80 MG tablet 180 tablet 0     Sig: Take 1 tablet (80 mg total) by mouth 2 (two) times a day.     TiZANidine (ZANAFLEX) 4 MG capsule 270 capsule 0     Sig: Take 3 capsules (12 mg total) by mouth at bedtime as needed for muscle spasms.     ketorolac (TORADOL) 10 mg tablet 10 tablet 0     Sig: Take 1 tablet (10 mg total) by mouth 2 (two) times a day as needed for pain.     You will continue with the medical cannabis program    REFILL INSTRUCTIONS:   Please contact your pharmacy and talk with your pharmacist for any refills of controlled substances. It will be beneficial to know the name of your medication, the date it was written ( 9/25/2018 ) the date you are able to fill. Please remember the date filled and the date started in some cases is different. Please remember to use your medication during the dates of use.        SAFETY REMINDER  We need to be able to reach you.  "Please be certain we have a working telephone number. If we are unable to reach you we may not be able to continue to prescribe opioid medication.    FAQ  Q. Why is alcohol consumption a concern with opioid medication?  A. Opioids suppress centers in the brain responsible for breathing. Generally a little decrease in drive to breathe is manageable. Alcohol enhances the suppression and can add to your lack of drive to breathe. Therefore it is unsafe to consume alcohol when you are using opioids. Opioids and alcohol are detectable in your urine for several days so if it is seen on a urine screen then they are used together and this is unsafe.     Q. I live with chronic pain and take my medication like it is prescribed why am I at risk for an overdose?  A. Opioids suppress centers in the brain responsible for breathing. Generally a little decrease in drive to breathe is manageable. When folks become ill like with pneumonia the addition of lungs that are struggling and may not absorb oxygen well can add an additional problem and lead to an overdose. If you are running a fever medication may be absorbed differently. Nausea and vomiting has led to folks \"losing\" medication - additional dosing because we do not know how much may have been absorbed can lead to an overdose. As we age general health changes occure and this can lead to increased issues with clearing medication from the liver or kidneys increasing the risk of an overdose.    Q. I have been using benzodiazepines for years with my opioids what happened that they should not be used together any more?  A. Combinations of medication can put a person at high risk for an unintentional overdose. In one study it was noted that 80% of unintentional overdoses occurred in people who were taking a combination of opioids and benzodiazepines.    Q. I hear about Naloxone and I understand is a medicine that can be used if there is a concern about an overdose, should I have it " available at home?  A. Anyone with an MME over 50 or who uses combinations of medication that enhance the effects of an opioid is a good candidate for Naloxone. If you do not have Naloxone ask me we can talk about it together.    Q. Why should I have a safe?  A. A big concern would be if someone else got your medication. Your medication is not prescribed to anyone other than you. You assume the responsibility of harm or death another person should someone else use your medication. Do not sell, loan, borrow or share your medication with anyone. It is illegal.     Q. What does an overdose look like?  A. Symptoms of overdose include:   !breathing slow and shallow, erratic or not at all  !pinpoint pupils, hallucinations  !confusion  !muscle jerks, slack muscles   !extreme sleepiness or loss of alertness   !awake but not able to talk   !face pale or clammy, vomiting, for lighter skinned people, the skin tone turns bluish purple, for darker skinned people, it turns grayish or ashen   If in a situation where overdose is a concern engage the emergency response system (dial 911).      Patient Arrived @ 1036 for a 1120 appointment.     TT: 1133 - 1157  CT: over half spent in education and counseling reviewing status and plan per HPI, medication - risk, benefit, considerations, safety      Lola Talavera APRN [facility]  [address] [address]   [phone]  [phone]

## 2021-06-21 NOTE — PROGRESS NOTES
PAIN CLINIC FOLLOW UP PROGRESS NOTE    CC:  Chief Complaint   Patient presents with     Follow-up     medical cannabis       HPI  Josy Valdez is a 53 y.o. female who presents for evaluation   Chief Complaint   Patient presents with     Follow-up     medical cannabis    that is causing continued pain. Since the last visit the patient denies any trips to the urgent care or ED specifically for their pain. The patient denies any new medications, diagnoses since the last visit. Specific questions indicated the patient wanted addressed today include: to follow up on her chronic intractable migraines that she is now taking medical cannabis for and reports that it reduces her to headaches from migraine status daily.     Major issues:  1. Chronic pain syndrome      Today the pain is located in their head and neck  and is described as constant, and is rated at a 4 on a scale of 1-10.  Associated symptoms: Denies any loss of bladder control, fevers/chills, unintentional weight loss, weakness, numbness or pain that interferes with sleep.   Aggravating factors include: increased stress and lack of sleep   Alleviating factors: [place] medical cannabis.   Adverse effects of medications: NONE  Functional symptoms: affects her ability to sleep and makes her anxious.   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  "    Physical Exam  Vitals:    11/21/18 1111   BP: 127/82   Patient Site: Right Arm   Patient Position: Sitting   Cuff Size: Adult Regular   Pulse: 88   Resp: 18   Weight: 220 lb (99.8 kg)   Height: 5' 5\" (1.651 m)     General- patient is alert and oriented, in NAD, well-groomed, well-nourished  Psych- Judgment and insight normal, AOx4, recent and remote memory normal, mood and affect normal  Eyes- pupils are equal and reactive, conjunctiva is clear bilaterally, no ptosis is noted.   Respiratory- breathing is non-labored  Cardiovascular- extremities warm and well perfused, no peripheral edema or varicosities.  Musculoskeletal- gait is normal, extremities with no joint swelling, erythema, or warmth.  Neuro- normal strength, no gait abnormalities, normal sensation to pain, temperature, light touch. Occipital headaches that radiate up from her cervical spine.   Integumentary- no rashes, dermatitis or discolorations noted throughout, no open wounds noted.    Medications    Current Outpatient Medications:      aspirin 81 mg chewable tablet, Chew 81 mg daily., Disp: , Rfl:      azelastine (ASTEPRO) 0.15 % (205.5 mcg) Spry nasal spray, Apply 205.5 mcg into each nostril 2 (two) times a day., Disp: , Rfl:      CHOLECALCIFEROL, VITAMIN D3, (VITAMIN D3 ORAL), Take by mouth., Disp: , Rfl:      DOCOSAHEXANOIC ACID/EPA (FISH OIL ORAL), Take by mouth., Disp: , Rfl:      FLUoxetine (PROZAC) 20 MG capsule, Take 1 capsule (20 mg total) by mouth 2 (two) times a day., Disp: 180 capsule, Rfl: 0     GARLIC ORAL, Take by mouth., Disp: , Rfl:      hydroCHLOROthiazide (HYDRODIURIL) 12.5 MG tablet, Take 12.5 mg by mouth daily., Disp: , Rfl:      HYDROcodone-acetaminophen (NORCO )  mg per tablet, Take 1 tablet by mouth every 4 (four) hours as needed for pain (max of 4/day and 140/28days)., Disp: 112 tablet, Rfl: 0     L.ACID/L.CASEI/B.BIF/B.ERIC/FOS (PROBIOTIC BLEND ORAL), Take by mouth., Disp: , Rfl:      " losartan-hydrochlorothiazide (HYZAAR) 50-12.5 mg per tablet, Take 1 tablet by mouth., Disp: , Rfl:      melatonin 3 mg TbER, Take 6 mg by mouth., Disp: , Rfl:      multivitamin capsule, Take 1 capsule by mouth daily., Disp: , Rfl:      omeprazole (PRILOSEC) 20 MG capsule, TAKE 1 CAPSULE BY MOUTH EVERY DAY, Disp: 30 capsule, Rfl: 1     polyethylene glycol (GLYCOLAX) 17 gram/dose powder, , Disp: , Rfl:      simvastatin (ZOCOR) 20 MG tablet, Take 20 mg by mouth bedtime., Disp: , Rfl:      TiZANidine (ZANAFLEX) 4 MG capsule, Take 3 capsules (12 mg total) by mouth at bedtime as needed for muscle spasms., Disp: 270 capsule, Rfl: 0     UNABLE TO FIND, Medical Cannabis, Disp: , Rfl:      verapamil (CALAN) 80 MG tablet, Take 1 tablet (80 mg total) by mouth 2 (two) times a day., Disp: 180 tablet, Rfl: 0     ketorolac (TORADOL) 10 mg tablet, Take 1 tablet (10 mg total) by mouth 2 (two) times a day as needed for pain., Disp: 10 tablet, Rfl: 0    Lab:  Last UDS on 10/24/2017 had expected results. Any abnormal results have been discussed with the patient and may change the plan of care. Please see the scanned document from the outside lab under the media tab. This was reviewed with the patient.      Imaging:  No new imaging.      Recent   Dated 2018 was reviewed with the patient today.   [facility] [place] Date: 18  Query Report Page#: 1  Patient Rx History Report  KING JEAN PAUL  Search Criteria: Last Name '' and First Name 'jean paul' and  = '[birth year]' and Request Period = '17' to  ' - 1 out of 1 Recipients Selected.  Fill Date Product, Str, Form Qty Days Pt ID Prescriber Written RX# N/R* Pharm **MED+  ---------- -------------------------------- ------------ ---- --------- ---------- ---------- ------------ ----- --------- ------  10/26/2018 HYDROCODONE-ACETAMIN  [id number] 2018 [id number] N [id number] [facility]  2018 HYDROCODONE-ACETAMIN  MG  [id number] 22 [id number] 09/25/2018 [id number] N [id number] [facility]  08/31/2018 HYDROCODONE-ACETAMIN  .[id number] 07/31/2018 [id number] N [id number] [facility]  08/03/2018 HYDROCODONE-ACETAMIN  .[id number] 07/31/2018 [id number] N [id number] [facility]  07/06/2018 HYDROCODONE-ACETAMIN  .[id number] 06/05/2018 [id number] N [id number] [facility]  06/08/2018 HYDROCODONE-ACETAMIN  .[id number] 06/05/2018 [id number] N [id number] [facility]  05/11/2018 HYDROCODONE-ACETAMIN  .[id number] 04/09/2018 [id number] N [id number] [facility]  04/13/2018 HYDROCODONE-ACETAMIN  .[id number] 04/09/2018 [id number] N [id number] [facility]  03/16/2018 HYDROCODONE-ACETAMIN  .[id number] 02/12/2018 [id number] N [id number] [facility]  02/17/2018 HYDROCODONE-ACETAMIN  .[id number] 02/12/2018 [id number] N [id number] [facility]  01/14/2018 HYDROCODONE-ACETAMIN  .[id number] 12/19/2017 [id number] N [id number] 50.0  12/21/2017 HYDROCODONE-ACETAMIN  .[id number] 12/19/2017 [id number] N [id number] 50.0  11/22/2017 HYDROCODONE-ACETAMIN  .[id number] 28 [id number] 10/24/2017 [id number] N [id number] 50.0  *N/R N=New R=Refill  +MED Daily  Prescribers for prescriptions listed  ----------------------------------------------------------------------------------------------------------------------------------  [id number] DANNI LYNCH APRN KASEY BC; [facility], [address] [place] [address]  Pharmacies that dispensed prescriptions listed  ----------------------------------------------------------------------------------------------------------------------------------  [id number] [facility] [place]; [facility] # [id number], [address] [facility] [address],  Patients that match bernie  criteria  ----------------------------------------------------------------------------------------------------------------------------------  [id number] KING JOSY,  63; [address] [facility] [address]  MED Summary  This section displays cumulative MED values by unique recipient. The MED Max value is the maximum occurrence of cumulative MED  sustained for any 3 consecutive days. This value is calculated based on prescriptions dispensed during the date range requested.  -----------------------------------------------------------------------------------------------------------------------------------  100 JOSY ZAIDI; 1965; [address] [facility] [address]  **Per CDC guidance, the conversion factors and associated daily morphine milligram equivalents for drugs prescribed as part of  medication-assisted treatment for opioid use disorder should not be used to benchmark against dosage thresholds meant for opioids  prescribed for pain.  Report Disclaimers:  The report provided above is based upon the search criteria and the data provided by the dispensing entities. For more information  about any prescription, please contact the dispenser or the prescriber.  This report contains confidential information, including patient identifiers, and is not a public record. The information on this  report must be treated as protected health information and is to be disclosed to others only as authorized by applicable state  and Federal regulations.        Assessment:   Josy Zaidi is a 53 y.o. female seen in clinic today for   Chief Complaint   Patient presents with     Follow-up     medical cannabis     She presents the pain Center today to follow-up on her current pain treatment plan in regards to the medical cannabis she is currently using.  She is using medical cannabis for her intractable migraine type pain after she failed different options including injections, opioids, sumatriptan's which  resulted in a stroke a limiting her ability to take them.  Botox injections in the past.  Currently the patient reports that the tangerine from the medical cannabis dispensary does help reduce her migraine pain which was daily to a headache that is tolerable.  Patient notes that she will be following up with Jessica Talavera regards to her opioid use for continued reduction.  Patient also notes that the tizanidine or muscle relaxer that she uses at bedtime does help her relax enough to where she can sleep because the patient is incurring on a monthly basis is approximately $400.  Pain is currently rated a 4 out of 10 and tolerable.  The patient follow-up in 2 months for her annual certification for the medical cannabis.     They are here for follow up and continued medical management of their pain. Today we reviewed the lab work of the UDT test and the results are expected because of the prescribed narcotics found in the urine drug screen.     I have also reviewed the information obtained from the last visit encounter after the DANITA was signed and have asked any pertintent questions needed from other healthcare providers/family/patient to continue care and formulate a treatment plan.     Patients current MME is 40  Patient to call clinic for next month's prescription 5 days in advance. Based on the patients response to their previous narcotic therapy and quality of life, which includes their participation in ADLs, I recommend that the narcotics therapy continue, however the changes listed below will be incorporated into their plan of care.     Patient set goals to   1. To reduce the pain from her daily headaches and migraines    Plan:   Interventions-    Follow up in 6 months to evaluate the effectiveness of the treatment interventions ordered today.     Continue the medical cannabis as you are, agree that you are getting benefit from the cannabis and notes that the tangerine helps reduce the headache pain to the to a  headache from the intractable migraines status    Try the [facility] dispensary for pricing.        UDT/SWAB:  Patient required a random Urine Drug Testing, due to the need to comply with [place] [employer] Policy Guidelines and CDC Guideline for the use of any controlled substances. This is to ensure that patient is compliant with treatment, and monitor for risks such as diversion, abuse, or any other aberrant behaviors. Patient is either being considered for or taking a controlled substance. Unexpected findings will be discussed and treatment decision may be adjusted. Testing is being implemented across the board randomly w/o bias related to age, race, gender, socioeconomic status or Islam affiliation.    The patient understand todays plan and has their questions answered in regards to expectations and current treatment plan.     SAFETY REMINDERS  No alcohol while taking controlled substances. Alcohol is not an illegal substance, it is unsafe to use in combination. It is a build up of substances in the body that can be extremely hazardous and may cause respirations to slow to a dangerous rate resulting in hospitalization, brain damage, or death.    In one study it was noted that 80% of unintentional overdoses occurred in people who were taking a combination of opioids and benzodiazepines.    Do not sell, loan, borrow or share your opioid medication with anyone. Deaths have occurred as a result of this practice. It is illegal and patients are being prosecuted.     Prevent unexpected access/loss of medication: Keep medication locked. Only carry what you need with you.    Tricia Bro, [facility]  [address]  [place] [address]  Ph: [phone]  Fax: [phone]

## 2021-06-21 NOTE — PROGRESS NOTES
Patient presents to the clinic today for a follow up with Tricia Bro CNP regarding medical cannabis. Her function score is 1.

## 2021-06-21 NOTE — PROGRESS NOTES
Subjective:   Josy Valdez is a 53 y.o. female who presents for evaluation of pain. Reviewed the rooming evaluation. Patient was last seen 9/25/18 reviewed record.     CC: Pain. Review of current status.     Major issues:  1. Chronic migraine without aura with status migrainosus, not intractable      Patient Active Problem List   Diagnosis     Migraine     Myalgia     Plantar fasciitis, bilateral     Hip pain, chronic, left     Lumbago       HPI: Questionnaires and records reviewed with the patient today.    Location/Laterality of the pain: neck pain  Quality: tight, wrung out  Timing: intermittent  Aggravating factors: sleeping wrong maybe  Alleviating factors: medication, rest, pillow    Headache:    Frequency of HA: daily    Duration of HA: all day, constant   Quality of HA: pulsing, throbbing    Location of HA: suboccipital w/ migraine then moves to the center of her head    Severity of HA: 4/10  Aura description: peripheral twinkling; black dots with the migraine, dizziness (this has been better)  #of HA days per mo: more than 15 per month    Rehabilitation: She has completed Craniosacral therapy and we will monitor for any changes over time. Hx of PT. Self massage  Interventional: TPI; Botox injection (did not offer impact and is not considered to be a option)  Prophylactic treatment: verapmil    Abortive Medication for HA: not a candidate r/t the CVA    Treated: Norco and Toradol (assistive), Dark room, sleep, medical cannabis  Associated Mainfestiation of the migraine: Photophobia, watery eyes  Not tried acupuncture afraid of needles, not interested in chiropractic       Functional Symptoms: Pain interferes with:  Sleep: +  Walking:    Ambulation/Transfer: Pt is ambulatory. Transfers independently.  Work:    Animal Care She is able to care for her dog.  ADL's: independent   Bathing    Cooking    Dressing    Housekeeping hard but it is getting done   Toileting    Shopping   Transportation:  Driving  Relationships/Social: Patient is engaged with family and friends in a meaningful fashion.     Activities Impaired by Increasing Pain Severity: F= 6  3-Enjoy  4-Work, Enjoy  5-Active, Mood Work Enjoy  6-Sleep, Active, Mood Work Enjoy  7-Walk, Sleep, Active, Mood Work Enjoy  8-Relate, Walk, Sleep, Active, Mood Work Enjoy    Impact of pain treatments:   Patient reports function has improved with current pain treatment: yes    Mood related to pain:   Depressed: -   Angry: -   Frustrated: -   Anxious: +   Helpless/Hopeless: -    Pertinent Medical Hx/Safety:   Blood thinners: -   Pregnant or wanting to become pregnant: -   New diagnostics since last visit: -   ED/UC visit since last visit: -   New treatment or New medical condition: -    Pain Plan of Care Review:   Medication:   Last opioid dose was Hydrocodone last dose- 11/23/2018 @ 1000am    Medication changes: Please communicate with your doctor about the verapamil and simvastatin interaction concern. We discussed stopping the Verapamil but it is noted this may be aiding your blood pressure in addition to the headaches. You are not certain of the impact of the simvastatin and were interested in talking with your primary.    The reduction of the opioid went well    Medication side/adverse effects: none  Aberrant behavior: none      Current Outpatient Medications:      aspirin 81 mg chewable tablet, Chew 81 mg daily., Disp: , Rfl:      azelastine (ASTEPRO) 0.15 % (205.5 mcg) Spry nasal spray, Apply 205.5 mcg into each nostril 2 (two) times a day., Disp: , Rfl:      CHOLECALCIFEROL, VITAMIN D3, (VITAMIN D3 ORAL), Take by mouth., Disp: , Rfl:      DOCOSAHEXANOIC ACID/EPA (FISH OIL ORAL), Take by mouth., Disp: , Rfl:      FLUoxetine (PROZAC) 20 MG capsule, Take 1 capsule (20 mg total) by mouth 2 (two) times a day., Disp: 180 capsule, Rfl: 0     GARLIC ORAL, Take by mouth., Disp: , Rfl:      hydroCHLOROthiazide (HYDRODIURIL) 12.5 MG tablet, Take 12.5 mg by mouth  daily., Disp: , Rfl:      HYDROcodone-acetaminophen (NORCO )  mg per tablet, Take 1 tablet by mouth every 4 (four) hours as needed for pain (max of 4/day and 140/28days)., Disp: 112 tablet, Rfl: 0     ketorolac (TORADOL) 10 mg tablet, Take 1 tablet (10 mg total) by mouth 2 (two) times a day as needed for pain., Disp: 10 tablet, Rfl: 0     L.ACID/L.CASEI/B.BIF/B.ERIC/FOS (PROBIOTIC BLEND ORAL), Take by mouth., Disp: , Rfl:      losartan-hydrochlorothiazide (HYZAAR) 50-12.5 mg per tablet, Take 1 tablet by mouth., Disp: , Rfl:      melatonin 3 mg TbER, Take 6 mg by mouth., Disp: , Rfl:      multivitamin capsule, Take 1 capsule by mouth daily., Disp: , Rfl:      omeprazole (PRILOSEC) 20 MG capsule, TAKE 1 CAPSULE BY MOUTH EVERY DAY, Disp: 30 capsule, Rfl: 1     polyethylene glycol (GLYCOLAX) 17 gram/dose powder, , Disp: , Rfl:      simvastatin (ZOCOR) 20 MG tablet, Take 20 mg by mouth bedtime., Disp: , Rfl:      TiZANidine (ZANAFLEX) 4 MG capsule, Take 3 capsules (12 mg total) by mouth at bedtime as needed for muscle spasms., Disp: 270 capsule, Rfl: 0     UNABLE TO FIND, Medical Cannabis, Disp: , Rfl:      verapamil (CALAN) 80 MG tablet, Take 1 tablet (80 mg total) by mouth 2 (two) times a day., Disp: 180 tablet, Rfl: 0    She indicates the medical cannabis is helping mood, pain and IBS. She has not seen improvement in her sleep    Review of Systems   Constitutional- + sleep disturbances, + activity intolerance  Musculoskeletal- + pain  HEENT-  + headach  Psych- - taking medication in a fashion other than prescribed    Social  No family history on file.  Social History     Tobacco Use   Smoking Status Former Smoker   Smokeless Tobacco Never Used     Social History     Substance and Sexual Activity   Alcohol Use No    Comment: in recovery     Social History     Substance and Sexual Activity   Drug Use No     Social History     Substance and Sexual Activity   Sexual Activity Not on file       Objective:  "    Vitals:    11/23/18 1056   BP: 132/83   Pulse: 86   Resp: 16   Weight: 220 lb (99.8 kg)   Height: 5' 5\" (1.651 m)   PainSc:   4       Constitutional:  Pleasant and cooperative female who presents alone today.   Psychiatric: Mood and affect are appropriate for the situation, setting and topic of discussion.  Patient does not appear sedated.  Integumentary:  Observed skin WNL.  HEENT: EOM's grossly intact.    Chest: Breathing is non-labored.   Neurological:  Alert and oriented in all spheres including: time, place, person and situation.      Diagnostics:   Lab:  Last UDT on 12/19/17.  Reviewed 12/29/17. Detected hydrocodone and metabolites (expected)     :  Dated 11/23/2018 reviewed to aid with decision regarding medication management        Assessment:   Josy Valdez is a 53 y.o. female seen in clinic today for migraine HA. Due to CVA she is not a candidate for triptans. Failed Botox. She has completed craniosacral therapy. She is doing a HEP from PT.   She started the medical cannabis program this has been going well. We will plan to reduce the hydrocodone again next visit         *Universal Precautions:    UDT/Swab- 12/19/17  Consent-11/23/18  Agreement- 11/23/8  Pharmacy- as documented    Count- #4 remaining hydrocodone at appt 7/17/15   Psychological evaluation: DA 9/18/15  MME- 40  Pharmacogenetic testing- n/a  MTM: n/a.    Management of opioid medication is inherently a moderate to high complex medial interaction based on the risk management required at each contact r/t risks and side effects.    Plan:   Plan of Care / NextSteps:     Follow up: 8 weeks     Education:   Please call Monday-Friday for problems or questions and one of the clinical support staff (CSS) will help to get things figured out. The number is [phone].     [employer] is a means to send an e-mail (JAZIO message) to communicate any concerns.     Please remember some issues require an office visit.     Today we reviewed the plan of " care and answered questions.      Records: Reviewed to assist with preparation for the office visit and are reflected throughout the note.    Primary Care: You need to have an annual physical and check in with your primary care folks on a regular basis. Talk with your primary care about the frequency of expected visits.     Medication:   Medication prescribed today: We are keeping the same and the next visit we will reduce again    Prescriptions Disp Refills     HYDROcodone-acetaminophen (NORCO )  mg per tablet Ok to fill 11/23/2018 for use 11/24-12/22 then  Ok to fill 12/21/18 for use 12/22-1/19/19 112 tablet 0     Sig: Take 1 tablet by mouth every 4 (four) hours as needed for pain (max of 4/day and 140/28days).     ketorolac (TORADOL) 10 mg tablet 10 tablet 0     Sig: Take 1 tablet (10 mg total) by mouth 2 (two) times a day as needed for pain.         REFILL INSTRUCTIONS:   Please contact your pharmacy and talk with your pharmacist for any refills of controlled substances. It will be beneficial to know the name of your medication, the date it was written ( 11/23/2018 ) and the date you are able to fill. Please remember the date filled and the date started in some cases are different. Please remember to use your medication during the dates of use.      SAFETY REMINDER  We need to be able to reach you. Please be certain we have a working telephone number. If we are unable to reach you we may not be able to continue to prescribe opioid medication.        FAQ  Q. Why is alcohol consumption a concern with pain medication?  A. Opioids decrease you drive to breathe. Alcohol enhances this effect.  Using together or within a week of each other is unsafe. If we can see it in the urine it is having an effect on your body.     Q. I live with chronic pain and take my medication like it is prescribed why am I at risk for an overdose?  A. Opioids decrease your drive to breathe. Generally a little decrease in drive to  "breathe is manageable. Illness like bladder infection, pneumonia, COPD, sleep apnea may decrease your ability to get oxygen. This can lead to an overdose.    If you are running a fever medication may be absorbed differently.     Nausea and vomiting have led to folks \"losing\" medication - additional dosing because we do not know how much may have been absorbed can lead to an overdose.     As we age general health changes occure and this can lead to increased issues with clearing medication from the liver or kidneys increasing the risk of an overdose.    Q. I have been using benzodiazepines for years with my opioids what happened that they should not be used together any more?  A. Combinations of medication can put a person at high risk for overdose. In one study it was noted that 80% of overdoses occurred in people who were taking a combination of opioids and benzodiazepines.    Q. I hear about Naloxone and I understand is a medicine that can be used if there is a concern about an overdose, should I have it available at home?  A. Anyone with an MME over 50 or who uses combinations of medication that enhance the effects of an opioid is a good candidate for Naloxone. If you do not have Naloxone ask me we can talk about it together.    Q. Why should I have a safe?  A. You assume the responsibility of harm or death another person should someone else use your medication. A big concern would be if someone else got your medication. Your medication is not prescribed to anyone other than you. Do not sell, loan, borrow or share your medication with anyone. It is illegal.     Q. What does an overdose look like?  A. Symptoms of overdose include:   !breathing slow and shallow, erratic or not at all  !pinpoint pupils, hallucinations  !confusion  !muscle jerks, slack muscles   !extreme sleepiness or loss of alertness   !awake but not able to talk   !face pale or clammy, vomiting, for lighter skinned people, the skin tone turns " bluish purple, for darker skinned people, it turns grayish or ashen   If in a situation where overdose is a concern engage the emergency response system (dial 911).      Patient Arrived @ 1047 for a 1120 appointment.         Lola L[facility]  [address] [facility] [address]   [phone]  [phone]

## 2021-06-22 ENCOUNTER — HOSPITAL ENCOUNTER (OUTPATIENT)
Dept: PALLIATIVE MEDICINE | Facility: OTHER | Age: 56
Discharge: HOME | End: 2021-06-22
Attending: NURSE PRACTITIONER
Payer: COMMERCIAL

## 2021-06-22 DIAGNOSIS — G43.711 INTRACTABLE CHRONIC MIGRAINE WITHOUT AURA AND WITH STATUS MIGRAINOSUS: ICD-10-CM

## 2021-06-22 DIAGNOSIS — G43.719 INTRACTABLE CHRONIC MIGRAINE WITHOUT AURA AND WITHOUT STATUS MIGRAINOSUS: ICD-10-CM

## 2021-06-22 DIAGNOSIS — G43.701 CHRONIC MIGRAINE WITHOUT AURA WITH STATUS MIGRAINOSUS, NOT INTRACTABLE: ICD-10-CM

## 2021-06-22 RX ORDER — TIZANIDINE HYDROCHLORIDE 4 MG/1
12 CAPSULE, GELATIN COATED ORAL
Qty: 270 CAPSULE | Refills: 0 | Status: SHIPPED
Start: 2021-08-01 | End: 2021-11-04

## 2021-06-22 RX ORDER — VERAPAMIL HYDROCHLORIDE 80 MG/1
80 TABLET ORAL 3 TIMES DAILY
Qty: 270 TABLET | Refills: 0 | Status: SHIPPED
Start: 2021-06-22 | End: 2021-09-14

## 2021-06-22 RX ORDER — KETOROLAC TROMETHAMINE 10 MG/1
10 TABLET, FILM COATED ORAL 2 TIMES DAILY PRN
Qty: 10 TABLET | Refills: 2 | Status: SHIPPED
Start: 2021-06-22 | End: 2021-07-20

## 2021-06-22 NOTE — TELEPHONE ENCOUNTER
Completed  Requested Prescriptions     Signed Prescriptions Disp Refills     FLUoxetine (PROZAC) 20 MG capsule 180 capsule 0     Sig: TAKE 1 CAPSULE BY MOUTH TWICE A DAY     Authorizing Provider: DANNI LYNCH

## 2021-06-22 NOTE — TELEPHONE ENCOUNTER
Patient's original message/refill request: Prozac refill request    Last appointment: 11/23/2018  Next appointment: 01/22/2019    Notes/Comments: per pharmacy medication was last filled on 10/19/2018 for a qty of 180 for a 90 day supply. Medication cued for approval, due on 01/17/2019    This refill is in compliance with the last dictation/plan of care.   Med queued up and sent to Lola Talavera CNP

## 2021-06-23 NOTE — PROGRESS NOTES
Patient is here for a follow up with Jessica Talavera CNP for her headaches        *Universal Precautions:    UDT/Swab- 01/22/2019  Consent-11/23/18  Agreement- 11/23/8  Pharmacy- as documented    Count- #4 remaining hydrocodone at appt 7/17/15   Psychological evaluation: DA 9/18/15  MME- 40  Pharmacogenetic testing- n/a  MTM: n/a.

## 2021-06-23 NOTE — PATIENT INSTRUCTIONS - HE
Plan:   Plan of Care / NextSteps:     Follow up the following date: schedule with Tricia in May 2019 review of medication cannabis; f/u wht Jessica in 2 months    Education:   Please call Monday-Friday for problems or questions and one of the clinical support staff (CSS) will help to get things figured out. The number is [phone].     [employer] is a means to send an e-mail ([employer] message) to communicate any concerns.     Please remember some issues require an office visit.     Today we reviewed the plan of care and answered questions.      Records: Reviewed to assist with preparation for the office visit and are reflected throughout the note.    Primary Care: You need to have an annual physical and check in with your primary care folks on a regular basis. Talk with your primary care about the frequency of expected visits.     Rehabilitation: remain active. We can consider physical therapy    Diagnostics: UDT/SWAB collected 1/22/19 results are pending.  UDT/SWAB:  Patient required a random Urine Drug Testing, due to the need to comply with [place] Model Policy Guidelines and CDC Guideline for the use of any controlled substances. This is to ensure that patient is compliant with treatment, and monitor for risks such as diversion, abuse, or any other aberrant behaviors. Patient is either being considered for or taking a controlled substance. Unexpected findings will be discussed and treatment decision may be adjusted. Testing is being implemented across the board randomly w/o bias related to age, race, gender, socioeconomic status or [employer] affiliation.       Medication:   Medication prescribed today:    Requested Prescriptions     Signed Prescriptions Disp Refills     ketorolac (TORADOL) 10 mg tablet 10 tablet 0     Sig: Take 1 tablet (10 mg total) by mouth 2 (two) times a day as needed for pain (max of 10/28 days).     verapamil (CALAN) 80 MG tablet 180 tablet 0     Sig: Take 1 tablet (80 mg total) by  mouth 2 (two) times a day.     TiZANidine (ZANAFLEX) 4 MG capsule 270 capsule 0     Sig: Take 3 capsules (12 mg total) by mouth at bedtime as needed for muscle spasms.     You are using 1 per day of the hydrocodone and we discussed strategies of further reduction. We discussed changing the dose of the medication to aid with reduction. We also spoke about cutting the medication to aid with weaning off a little at a time.    You are using the toradol - Ketorolac but this has not been often.          REFILL INSTRUCTIONS: Please be mindful to chose a single means of communication about medication refills as multiple means of communication for the same prescription request  (your pharmacy, mychart and telephone calls) leads to confusion and delays    Note: Due to the increase in paperwork we are no longer leaving voice mail messages when refills have been sent to the pharmacy    Please contact the clinic 3-7 days before your refill is due. Speak clearly; note cell phones cut in-and-out and poor quality speech and reception issues will influence our ability to hear you and be efficient with your prescription.     Call [phone] leave:   Your name (first and last w/ spelling)   Date of birth  Name of all the medication(s) being requested  Dose of the medication(s)   How you are taking the medication (eg. twice per day etc).     Contact your pharmacy and talk with your pharmacist about any government Controlled/Scheduled medications 3 (three) days after leaving your message to see if your prescription has been received. Please request the pharmacist check your profile to be certain about any concerns with a script failing to be received.   If the script has not been received there may have been a problem with the communication please reach back out to the clinic.

## 2021-06-23 NOTE — PROGRESS NOTES
Subjective:   Josy Valdez is a 53 y.o. female who presents for evaluation of pain. Reviewed the rooming evaluation. Patient was last seen 11/23/18 reviewed record.     CC: Pain. Review of current status.     Major issues:  1. Chronic migraine without aura with status migrainosus, not intractable    2. Intractable chronic migraine without aura and with status migrainosus      Patient Active Problem List   Diagnosis     Migraine     Myalgia     Plantar fasciitis, bilateral     Hip pain, chronic, left     Lumbago       HPI: Questionnaires and records reviewed with the patient today.    Location/Laterality of the pain: neck pain, and right shoulder  Severity: Today: 3  Quality: dull  Timing: constant  Aggravating factors: overdoing.   Alleviating factors: pacing activities, medical cannabis  Any New pain, injuries, falls: no  Since last visit pain has: yes    Headache:  She indicated continued but not as intense. She will get a migraine about 1 x per week.  Frequency of HA: daily    Duration of HA: all day, constant   Quality of HA: pulsing, throbbing    Location of HA: suboccipital w/ migraine then moves to the center of her head    Severity of HA: 4/10  Aura description: peripheral twinkling; black dots with the migraine, dizziness (this has been better)  #of HA days per mo: more than 15 per month    Rehabilitation: She has completed Craniosacral therapy and we will monitor for any changes over time. Hx of PT. Self massage  Interventional: TPI; Botox injection (did not offer impact and is not considered to be a option)  Prophylactic treatment: verapmil    Abortive Medication for HA: not a candidate r/t the CVA    Treated: Norco and Toradol (assistive), Dark room, sleep, medical cannabis  Associated Mainfestiation of the migraine: Photophobia, watery eyes  Not tried acupuncture afraid of needles, not interested in chiropractic     Functional Symptoms: Pain interferes with:  Sleep: +  Walking:    Ambulation/Transfer:  Pt is ambulatory. Transfers independently.  ADL's: independent:   Bathing    Cooking    Dressing    Housekeeping    Toileting    Shopping   Relationships/Social: Patient is engaged with family and friends in a meaningful fashion.     Activities Impaired by Increasing Pain Severity: F= 6  3-Enjoy  4-Work, Enjoy  5-Active, Mood Work Enjoy  6-Sleep, Active, Mood Work Enjoy  7-Walk, Sleep, Active, Mood Work Enjoy  8-Relate, Walk, Sleep, Active, Mood Work Enjoy    Impact of pain treatments:   Patient reports function has improved with current pain treatment: yes    Mood related to pain:   Depressed: -   Angry: -   Frustrated: -   Anxious: +   Helpless/Hopeless: -    Pertinent Medical Hx/Safety:   Blood thinners: -   Pregnant or wanting to become pregnant: -   New diagnostics since last visit: -   ED/UC visit since last visit: -   New treatment or New medical condition: -    Pain Plan of Care Review:   Medication:   Last opioid dose was Hydrocodone ast dose- 01/22/2019 @ 700am    Medication changes: no  Medication side/adverse effects: no  Aberrant behavior: no      Current Outpatient Medications:      aspirin 81 mg chewable tablet, Chew 81 mg daily., Disp: , Rfl:      azelastine (ASTEPRO) 0.15 % (205.5 mcg) Spry nasal spray, Apply 205.5 mcg into each nostril 2 (two) times a day., Disp: , Rfl:      CHOLECALCIFEROL, VITAMIN D3, (VITAMIN D3 ORAL), Take by mouth., Disp: , Rfl:      DOCOSAHEXANOIC ACID/EPA (FISH OIL ORAL), Take by mouth., Disp: , Rfl:      FLUoxetine (PROZAC) 20 MG capsule, TAKE 1 CAPSULE BY MOUTH TWICE A DAY, Disp: 180 capsule, Rfl: 0     GARLIC ORAL, Take by mouth., Disp: , Rfl:      hydroCHLOROthiazide (HYDRODIURIL) 12.5 MG tablet, Take 12.5 mg by mouth daily., Disp: , Rfl:      HYDROcodone-acetaminophen (NORCO )  mg per tablet, Take 1 tablet by mouth every 4 (four) hours as needed for pain (max of 4/day and 140/28days)., Disp: 112 tablet, Rfl: 0 - she is taking about 1 per day     ketorolac  (TORADOL) 10 mg tablet, Take 1 tablet (10 mg total) by mouth 2 (two) times a day as needed for pain (max of 10/28 days)., Disp: 10 tablet, Rfl: 1 - continued PRN use     L.ACID/L.CASEI/B.BIF/B.ERIC/FOS (PROBIOTIC BLEND ORAL), Take by mouth., Disp: , Rfl:      losartan-hydrochlorothiazide (HYZAAR) 50-12.5 mg per tablet, Take 1 tablet by mouth., Disp: , Rfl:      melatonin 3 mg TbER, Take 6 mg by mouth., Disp: , Rfl:      multivitamin capsule, Take 1 capsule by mouth daily., Disp: , Rfl:      omeprazole (PRILOSEC) 20 MG capsule, TAKE 1 CAPSULE BY MOUTH EVERY DAY, Disp: 30 capsule, Rfl: 1     polyethylene glycol (GLYCOLAX) 17 gram/dose powder, , Disp: , Rfl:      TiZANidine (ZANAFLEX) 4 MG capsule, Take 3 capsules (12 mg total) by mouth at bedtime as needed for muscle spasms., Disp: 270 capsule, Rfl: 0 - continues to help with sleep     UNABLE TO FIND, Medical Cannabis, Disp: , Rfl: - Tangerine - she feels it is improving her pain and she feels more energy and her mood has been stable     verapamil (CALAN) 80 MG tablet, Take 1 tablet (80 mg total) by mouth 2 (two) times a day., Disp: 180 tablet, Rfl: 0 - continued    Behavioral Medicine: depression and anxiety around Sonja she is coming out of it      Review of Systems   Constitutional- denies sleep disturbances, + activity intolerance  Musculoskeletal- + pain  Neuro- - cognitive changes  HEENT: +HA  Endo: + weight changes (reduction of 6#)  GI: IBS improved   Psych-  + mood concerns (stable),  - taking medication in a fashion other than prescribed    Social  No family history on file.  Social History     Tobacco Use   Smoking Status Former Smoker   Smokeless Tobacco Never Used     Social History     Substance and Sexual Activity   Alcohol Use No    Comment: in recovery     Social History     Substance and Sexual Activity   Drug Use No     Social History     Substance and Sexual Activity   Sexual Activity Not on file       Objective:     Vitals:    01/22/19 1134  "  BP: 143/85   Resp: 16   Weight: 220 lb (99.8 kg)   Height: 5' 5\" (1.651 m)   PainSc:   3       Constitutional:  Pleasant and cooperative female who presents alone today.   Psychiatric: Mood and affect are appropriate for the situation, setting and topic of discussion.  Patient does not appear sedated.  Integumentary:  Observed skin WNL.   HEENT: EOM's grossly intact.    Chest: Breathing is non-labored.   Neurological:  Alert and oriented in all spheres including: time, place, person and situation.      Diagnostics:   Lab:  Last UDT on 12/19/17.  Reviewed 12/29/17. Detected hydrocodone and metabolites (expected)       :  Dated 1/22/2019 reviewed to aid with decision regarding medication management    Assessment:   Josy Valdez is a 53 y.o. female seen in clinic today for migraine HA. Due to CVA she is not a candidate for triptans. Failed Botox. She has completed craniosacral therapy. She is doing a HEP from PT.   She started the medical cannabis program this has been going well.     We are working down and off the opioid medication. She is doing well with the medical cannabis. We have discussed she may need opioids intermittently and she may need them when she travels as the medical cannabis is not able to be transported across state lines.    *Universal Precautions:    UDT/Swab- 01/22/2019  Consent-11/23/18  Agreement- 11/23/8  Pharmacy- as documented    Count- #4 remaining hydrocodone at appt 7/17/15   Psychological evaluation: DA 9/18/15  MME- 40  1/22/19 MME=10  Pharmacogenetic testing- n/a  MTM: n/a.      Management of opioid medication is inherently a moderate to high complex medial interaction based on the risk management required at each contact r/t risks and side effects.    Plan:   Plan of Care / NextSteps:     Follow up the following date: schedule with Tricia in May 2019 review of medication cannabis; f/u sindy Lopez in 2 months    Education:   Please call Monday-Friday for problems or questions and " one of the clinical support staff (CSS) will help to get things figured out. The number is [phone].     "[employer] is a means to send an e-mail (CloudAccess message) to communicate any concerns.     Please remember some issues require an office visit.     Today we reviewed the plan of care and answered questions.      Records: Reviewed to assist with preparation for the office visit and are reflected throughout the note.    Primary Care: You need to have an annual physical and check in with your primary care folks on a regular basis. Talk with your primary care about the frequency of expected visits.     Rehabilitation: remain active. We can consider physical therapy    Diagnostics: UDT/SWAB collected 1/22/19 results are pending.  UDT/SWAB:  Patient required a random Urine Drug Testing, due to the need to comply with [place] Model Policy Guidelines and CDC Guideline for the use of any controlled substances. This is to ensure that patient is compliant with treatment, and monitor for risks such as diversion, abuse, or any other aberrant behaviors. Patient is either being considered for or taking a controlled substance. Unexpected findings will be discussed and treatment decision may be adjusted. Testing is being implemented across the board randomly w/o bias related to age, race, gender, socioeconomic status or Scientology affiliation.       Medication:   Medication prescribed today:    Requested Prescriptions     Signed Prescriptions Disp Refills     ketorolac (TORADOL) 10 mg tablet 10 tablet 0     Sig: Take 1 tablet (10 mg total) by mouth 2 (two) times a day as needed for pain (max of 10/28 days).     verapamil (CALAN) 80 MG tablet 180 tablet 0     Sig: Take 1 tablet (80 mg total) by mouth 2 (two) times a day.     TiZANidine (ZANAFLEX) 4 MG capsule 270 capsule 0     Sig: Take 3 capsules (12 mg total) by mouth at bedtime as needed for muscle spasms.     You are using 1 per day of the hydrocodone and we discussed  strategies of further reduction. We discussed changing the dose of the medication to aid with reduction. We also spoke about cutting the medication to aid with weaning off a little at a time.    You are using the toradol - Ketorolac but this has not been often.          REFILL INSTRUCTIONS: Please be mindful to chose a single means of communication about medication refills as multiple means of communication for the same prescription request  (your pharmacy, mychart and telephone calls) leads to confusion and delays    Note: Due to the increase in paperwork we are no longer leaving voice mail messages when refills have been sent to the pharmacy    Please contact the clinic 3-7 days before your refill is due. Speak clearly; note cell phones cut in-and-out and poor quality speech and reception issues will influence our ability to hear you and be efficient with your prescription.     Call [phone] leave:   Your name (first and last w/ spelling)   Date of birth  Name of all the medication(s) being requested  Dose of the medication(s)   How you are taking the medication (eg. twice per day etc).     Contact your pharmacy and talk with your pharmacist about any government Controlled/Scheduled medications 3 (three) days after leaving your message to see if your prescription has been received. Please request the pharmacist check your profile to be certain about any concerns with a script failing to be received.   If the script has not been received there may have been a problem with the communication please reach back out to the clinic.          Patient Arrived @ 1104 for a 1140 appointment.     TT: [phone] - [phone]    Lola Talavera APRN [facility]  [address] [address]   [phone]  [phone]

## 2021-06-24 NOTE — TELEPHONE ENCOUNTER
Prescription from 01/02/2019 to start on 01/17/2019 for for a qty of 180 tablets which is a 90 day supply. Patient does not need a refill at this time

## 2021-06-25 NOTE — TELEPHONE ENCOUNTER
Refill request: hydrocodone  Last ov with JH: 3/17/21  Follow up in 3 months  Appears in the chart that there is a missed ov with CM on 5/4/21    CSA/ UDT 01/2021     Per :  Last sold on 3/25/21, #40 tabs    Unclear why an apt was set up with CM: 5/4/21    Requested Prescriptions     Pending Prescriptions Disp Refills     HYDROcodone-acetaminophen (NORCO )  mg per tablet 40 tablet 0     Sig: Take 1 tablet by mouth every 4 (four) hours as needed for pain (max of 4/day and 40/28days).     [facility]

## 2021-06-25 NOTE — PROGRESS NOTES
Patient is here for a follow up with Jessica Talavera CNP for her headaches             *Universal Precautions:    UDT/Swab- 01/22/2019  Consent-11/23/18  Agreement- 11/23/8  Pharmacy- as documented    Count- #4 remaining hydrocodone at appt 7/17/15   Psychological evaluation: DA 9/18/15  MME- 40  1/22/19 MME=10  03/21/2019 MME-  Pharmacogenetic testing- n/a  MTM: n/a.

## 2021-06-26 NOTE — PATIENT INSTRUCTIONS - HE
Plan:   Thank you for participating in the video visit - Here is the Plan of Care / NextSteps:     Contact [phone] to reserve a time. You need to follow up by: 2-3 months      Communicating with us:  Please call Monday-Friday for problems or questions - leave a message and one of the clinical support staff (CSS) will help to get things figured out. The number is [phone].     You may also opt to communicate through [employer].     Rehabilitation: Remain active continue your home exercises and stretches.     Integrative: Medical cannabis continued    Medication prescribed / to be continued:   Medication prescribed today:    Requested Prescriptions     Signed Prescriptions Disp Refills     verapamiL (CALAN) 80 MG tablet 270 tablet 0     Sig: Take 1 tablet (80 mg total) by mouth 3 (three) times a day.     TiZANidine (ZANAFLEX) 4 MG capsule 270 capsule 0     Sig: Take 3 capsules (12 mg total) by mouth at bedtime as needed for muscle spasms.     ketorolac (TORADOL) 10 mg tablet 10 tablet 2     Sig: Take 1 tablet (10 mg total) by mouth 2 (two) times a day as needed for pain (max of 10/28 days).     You will reach out when you need the hydrocodone    Briefly discussed the CGRP medications like Emgality

## 2021-06-26 NOTE — PROGRESS NOTES
Medical Record review and prep:  Start Time: 0758  End Time: 0803  TT: 5    Video Start Time: 10:56 AM  Video-Visit Details    Type of service:  Video Visit    Video End Time (time video stopped): 11:17 AM  Originating Location (pt. Location): Home    Distant Location (provider location):  [facility]     Platform used for Video Visit: [employer]    Wrap up:  Start Time: 1251  End Time: 1255  TT: 4      Josy Valdez is a 56 y.o. female last evaluated 3/17/21. H/O IBS, anxiety. Is being evaluated for migraine.      Major issues:  1. Intractable chronic migraine without aura and without status migrainosus      Patient Active Problem List   Diagnosis     Migraine     Myalgia     Plantar fasciitis, bilateral     Hip pain, chronic, left     Lumbago       HPI:    Headache:   Frequency of HA: daily, migraines about 1/week  Duration of HA: all day, constant, migraines will last 2days  Quality of HA: pulsing, throbbing    Location of HA: suboccipital w/ migraine then moves to the center of her head - neck pain,  Head - center of the head and back of the skull. Her neck will ache when she has a migraine  Severity of HA: 4/10  Aura description: peripheral twinkling; black dots with the migraine, dizziness (has not bother her for some time); she will get forgetful and she will yawn a lot before a HA  #of HA days per mo: daily baseline HA  Rehabilitation: She has completed Craniosacral therapy and we will monitor for any changes over time. Hx of PT. Self massage  Interventional: TPI; Botox injection (did not offer impact and is not considered to be a option)  Prophylactic treatment: verapmil , medical cannabis  Abortive Medication for HA: not a candidate r/t the CVA, medical cannabis  Treated: Norco and Toradol (assistive), Dark room, sleep, medical cannabis, OTC topical  Associated Mainfestiation of the migraine: Photophobia, watery eyes  Not tried acupuncture afraid of needles, not interested in  chiropractic       Since last visit pain has: stable - no changes    Functional Symptoms: Pain interferes with:  Walking:    Ambulation/Transfer: Pt is ambulatory. Transfers independently.  ADL's: she does what she is able. She will struggle with washing her hair b/c of shoulder soreness  Additional social indicators for health:  Housing: secure  Concentration: no issues  Relationships/Social: no specific trips planned    Impact of pain treatments:   Patient reports function has improved with current pain treatment: yes      Pain Plan of Care Review:   Medication:   Last opioid dose was Norco 6/21/21 2000    Medication changes: no  Medication side/adverse effects: no  Aberrant behavior: no  Considerations: no      Current Outpatient Medications:      aspirin 81 mg chewable tablet, Chew 81 mg daily., Disp: , Rfl:      atorvastatin (LIPITOR) 20 MG tablet, Take 20 mg by mouth., Disp: , Rfl:      CHOLECALCIFEROL, VITAMIN D3, (VITAMIN D3 ORAL), Take by mouth daily. , Disp: , Rfl:      DOCOSAHEXANOIC ACID/EPA (FISH OIL ORAL), Take by mouth., Disp: , Rfl:      fluoxetine HCl (PROZAC ORAL), Take 60 mg by mouth., Disp: , Rfl:      GARLIC ORAL, Take by mouth., Disp: , Rfl:      hydroCHLOROthiazide (HYDRODIURIL) 12.5 MG tablet, Take 12.5 mg by mouth daily., Disp: , Rfl:    (5/26-6/23)  HYDROcodone-acetaminophen (NORCO )  mg per tablet, Take 1 tablet by mouth every 4 (four) hours as needed for pain (max of 4/day and 40/28days)., Disp: 40 tablet, Rfl: 0 - using she has about 20 remaining no refill needed today occasionally she is using about 1/day on average. If she has a significant migraines she will use 2     ketorolac (TORADOL) 10 mg tablet, Take 1 tablet (10 mg total) by mouth 2 (two) times a day as needed for pain (max of 10/28 days)., Disp: 10 tablet, Rfl: 2 - not used for some time she likes to have on hand     L.ACID/L.CASEI/B.BIF/B.ERIC/FOS (PROBIOTIC BLEND ORAL), Take by mouth., Disp: , Rfl:      losartan  (COZAAR) 50 MG tablet, Take 100 mg by mouth daily., Disp: , Rfl:      melatonin 3 mg TbER, Take 6 mg by mouth., Disp: , Rfl:      multivitamin capsule, Take 1 capsule by mouth daily., Disp: , Rfl:      pantoprazole (PROTONIX) 20 MG tablet, Take 20 mg by mouth daily. , Disp: , Rfl:      polyethylene glycol (GLYCOLAX) 17 gram/dose powder, Take 17 g by mouth daily as needed. , Disp: , Rfl:      prochlorperazine (COMPAZINE) 10 MG tablet, Take 1 tablet (10 mg total) by mouth every 6 (six) hours as needed for nausea., Disp: 30 tablet, Rfl: 0     TiZANidine (ZANAFLEX) 4 MG capsule, Take 3 capsules (12 mg total) by mouth at bedtime as needed for muscle spasms., Disp: 270 capsule, Rfl: 0- continued     UNABLE TO FIND, Medical Cannabis - chronic intractable Pain - Migraine Oral solution: 2 mL by mouth once daily Capsules: 1 capsule by mouth at bedtime Vape; 1-4 puffs inhaled as needed, Disp: , Rfl: - recertified today     verapamiL (CALAN) 80 MG tablet, Take 1 tablet (80 mg total) by mouth 2 (two) times a day., Disp: 180 tablet, Rfl: 0 - she has been taking three times a day b/c of some focus on blood pressure    :   6/22/2021 Reviewed to aid with decision regarding medication management  Last script:  Date: 5/27 due 6/24  Medication: hydrocodone  Dose: 10/325mg   Dispensed: 40   Prescriber: jania    Scheduled medication from other professionals: no    Medication(s): -    Expected dispensing: ok    Medical Cannabis:  Qualifying Condition: Chronic Intractable Pain - migrain HA of note not qualified for but getting benefit for IBS  Qualifying Provider: Sadaf as of 8/28/20  [facility]: [facility]  Email: [email or web address]     Transactions:  2/12/21    Products:  Suspension  Capsul  vape  Topical gel    Ratio:  tangerine    Benefit   List  benefit(s) migraine support, sleep, anxiety, IBS    Negative effect  List negative effect(s) coughing    Specific negative effect(s)  Physical side effects: stomach upset, fatigue,  headache, blurred vision: no  Mental/cognitive side effects: mental clouding, confusion, depression: no  Worsening of symptoms related to the condition being treated; cannabis is not working well over time: no  Common side effects include dizziness, dry mouth, lightheadedness, drowsiness, cough, bronchitis: +  Nausea, vomiting, alleviated with hot showers: no    Exercising safety with driving, operate machinery: yes  Keep medication secure and in their original containers +  Difficulty/inconvenience in accessing medical cannabis no issues with access    Provider clinical observations regarding medical cannabis use in this patient: general benefit  Program improvements or additional information less expensive      Rehabilitation:  Home exercise program: treadmill and walking  Objective:     Vitals:    06/22/21 1042   PainSc:   4       4     Constitutional:  Pleasant and cooperative female who presents alone today.   Psychiatric: Mood and affect are appropriate for the situation, setting and topic of discussion.  Patient does not appear sedated.  Integumentary:  Observed skin WNL.   HEENT: EOM's grossly intact.    Chest: Breathing is non-labored.   Neurological:  Alert and oriented in all spheres including: time, place, person and situation.      Diagnostics:   Lab:  Last UDT on   Reviewed note 1/26/21 Last opioid dose was Norco at 6am on 1/26/2021. Pt is also on the medical cannabis program     UDT:  Detected mariajuana metabolite (on medical cannabis program); Detected hydrocodone and metabolites (expected).          Assessment:   Josy Valdez is a 56 y.o. female H/O CVA, anxiety, IBS    Video evaluation for migraine HA    Due to CVA she is not a candidate for triptans. Failed Botox. She has completed craniosacral therapy. She is doing a HEP from PT.   Working with the medical cannabis program.      She has done exceptionally well with the current combination of medication and medical cannabis. She does not always  fill the hydrocodone monthly.     Continue video visits     *Universal Precautions:    CSA/ UDT 01/2021   Pharmacy- as documented    Count- #4 remaining hydrocodone at appt 7/17/15   Psychological evaluation: DA 9/18/15  MME- 40  1/22/19 MME=10  03/21/2019 MME- 10   05/23/2019 MME- 10  8/29/20 MME up to 40 receiving 40pills/month  Pharmacogenetic testing- n/a  MTM: n/a.  Medical cannabis re-qualified 6/22/21      Management of opioid medication is inherently a moderate to high complex medial interaction based on the risk management required at each contact r/t risks and side effects.      Plan:   Thank you for participating in the video visit - Here is the Plan of Care / NextSteps:     Contact [phone] to reserve a time. You need to follow up by: 2-3 months      Communicating with us:  Please call Monday-Friday for problems or questions - leave a message and one of the clinical support staff (CSS) will help to get things figured out. The number is [phone].     You may also opt to communicate through [employer].     Rehabilitation: Remain active continue your home exercises and stretches.     Integrative: Medical cannabis continued    Medication prescribed / to be continued:   Medication prescribed today:    Requested Prescriptions     Signed Prescriptions Disp Refills     verapamiL (CALAN) 80 MG tablet 270 tablet 0     Sig: Take 1 tablet (80 mg total) by mouth 3 (three) times a day.     ketorolac (TORADOL) 10 mg tablet 10 tablet 2     Sig: Take 1 tablet (10 mg total) by mouth 2 (two) times a day as needed for pain (max of 10/28 days).     TiZANidine (ZANAFLEX) 4 MG capsule 270 capsule 0     Sig: Take 3 capsules (12 mg total) by mouth at bedtime as needed for muscle spasms.     You will reach out when you need the hydrocodone    Briefly discussed the CGRP medications like Emgality      Lola Talavera APRN FNP-BC  [address] [facility] [address]   [phone]  [phone]      Lola  Sadaf, CNP

## 2021-06-26 NOTE — PROGRESS NOTES
Josy Valdez is a 56 y.o. female who is being evaluated via a billable video visit.      How would you like to obtain your AVS? Mail a copy.  If dropped from the video visit, the video invitation should be resent by: Text to cell phone: [phone]  Will anyone else be joining your video visit? No      Video-Visit Details    Type of service:  Video Visit    Distant Location (provider location):  [facility]     Platform used for Video Visit: Doximity  Pain score: 4/10  Constant   What does your pain feel like: base of head, moves to center of head, throbbing  Does the pain interfere with:  Work: yes  Walking/distance: no  Sleep: yes  Daily activities: no  Relationships/social life: no  Mood: yes  F= 6

## 2021-06-29 NOTE — PROGRESS NOTES
"Progress Notes by Shanita Aguilera CMA at 4/15/2020 10:00 AM     Author: Shanita Aguilera CMA Service: -- Author Type: Certified Medical Assistant    Filed: 4/15/2020 10:23 AM Date of Service: 4/15/2020 10:00 AM Status: Signed    : Shanita Aguilera CMA (Certified Medical Assistant)       Josy Valdez is a 55 y.o. female who is being evaluated via a billable telephone visit.      The patient has been notified of following:     \"This telephone visit will be conducted via a call between you and your physician/provider. We have found that certain health care needs can be provided without the need for a physical exam.  This service lets us provide the care you need with a short phone conversation.  If a prescription is necessary we can send it directly to your pharmacy.    Telephone visits are billed at different rates depending on your insurance coverage. During this emergency period, for some insurers they may be billed the same as an in-person visit.  Please reach out to your insurance provider with any questions.    If during the course of the call the physician/provider feels a telephone visit is not appropriate, you will not be charged for this service.\"    Patient has given verbal consent to a Telephone visit? Yes    Patient would like to receive their AVS by AVS Preference: Mail a copy.     Pain score 4  Constant  What does your pain like feel during a flare? Ache   Does the pain interfere with:  Work ----- yes  Walking ------ no  Sleep ------- yes  Daily activities ------no  Relationships -------no  F=6        Patient presents to the clinic today for a follow up with Lola Talavera CNP regarding headache pain. Patient describes their pain as ache. Her/His function score is 6.      Shanita Aguilera CMA         "

## 2021-06-29 NOTE — PROGRESS NOTES
"Progress Notes by Chiqui Davis CMA at 6/11/2020 11:20 AM     Author: Chiqui Davis CMA Service: -- Author Type: Certified Medical Assistant    Filed: 6/11/2020 12:00 PM Date of Service: 6/11/2020 11:20 AM Status: Signed    : Chiqui Davis CMA (Certified Medical Assistant)       Josy Valdez is a 55 y.o. female who is being evaluated via a billable telephone visit.       The patient has been notified of following:      \"This telephone visit will be conducted via a call between you and your physician/provider. We have found that certain health care needs can be provided without the need for a physical exam.  This service lets us provide the care you need with a short phone conversation.  If a prescription is necessary we can send it directly to your pharmacy. Telephone visits are billed at different rates depending on your insurance coverage. During this emergency period, for some insurers they may be billed the same as an in-person visit.  Please reach out to your insurance provider with any questions.\"     Patient has given verbal consent to a Telephone visit? Yes  Phone number for telephone visit: Mobile  Patient would like to receive their AVS: Mail a copy.      Pain score 4  Constant  What does your pain like feel during a flare? Ache, radiates  Does the pain interfere with:  Work ----- yes  Walking ------ no  Sleep ------- yes  Daily activities ------no  Relationships -------no  F=6               "

## 2021-07-03 NOTE — ADDENDUM NOTE
Addendum Note by Ashley Saucedo at 6/11/2020 11:20 AM     Author: Ashley Saucedo Service: -- Author Type: --    Filed: 6/17/2020  8:50 AM Date of Service: 6/11/2020 11:20 AM Status: Signed    : Ashley Saucedo    Encounter addended by: Ashley Saucedo on: 6/17/2020  8:50 AM      Actions taken: Charge Capture section accepted

## 2021-07-03 NOTE — ADDENDUM NOTE
Addendum Note by Stephanie Magallanes CMA at 7/16/2019 10:50 AM     Author: Stephanie Magallanes CMA Service: -- Author Type: Certified Medical Assistant    Filed: 7/16/2019 11:34 AM Date of Service: 7/16/2019 10:50 AM Status: Signed    : Stephanie Magallanes CMA (Certified Medical Assistant)    Encounter addended by: Stephanie Magallanes CMA on: 7/16/2019 11:34 AM      Actions taken: Visit Navigator Flowsheet section accepted, Sign   clinical note

## 2021-07-03 NOTE — ADDENDUM NOTE
Addendum Note by Ashley Saucedo at 1/26/2021 10:40 AM     Author: Ashley Saucedo Service: -- Author Type: --    Filed: 2/1/2021  5:25 AM Date of Service: 1/26/2021 10:40 AM Status: Signed    : Ashley Saucedo    Encounter addended by: Ashley Saucedo on: 2/1/2021  5:25 AM      Actions taken: Charge Capture section accepted

## 2021-07-03 NOTE — ADDENDUM NOTE
Addendum Note by Cande Carroll RN at 8/6/2020  3:55 PM     Author: Cande Carroll RN Service: -- Author Type: Registered Nurse    Filed: 8/6/2020  3:55 PM Encounter Date: 8/4/2020 Status: Signed    : Cande Carroll RN (Registered Nurse)    Addended by: CANDE CARROLL on: 8/6/2020 03:55 PM        Modules accepted: Orders

## 2021-07-03 NOTE — ADDENDUM NOTE
Addendum Note by Lola Talavera CNP at 8/6/2020  5:44 PM     Author: Lola Talavera CNP Service: -- Author Type: Nurse Practitioner    Filed: 8/6/2020  5:44 PM Encounter Date: 8/4/2020 Status: Signed    : Lola Talavera CNP (Nurse Practitioner)    Addended by: LOLA TALAVERA on: 8/6/2020 05:44 PM        Modules accepted: Orders

## 2021-07-03 NOTE — ADDENDUM NOTE
Addendum Note by Chiqui Davis CMA at 1/26/2021 10:40 AM     Author: Chiqui Davis CMA Service: -- Author Type: Certified Medical Assistant    Filed: 1/26/2021  2:15 PM Date of Service: 1/26/2021 10:40 AM Status: Signed    : Chiqui Davis CMA (Certified Medical Assistant)    Encounter addended by: Chiqui Davis CMA on: 1/26/2021  2:15 PM      Actions taken: Visit diagnoses modified, Order list changed, Diagnosis   association updated

## 2021-07-03 NOTE — ADDENDUM NOTE
Addendum Note by Ashley Saucedo at 4/15/2020 10:00 AM     Author: Ashley Saucedo Service: -- Author Type: --    Filed: 5/11/2020  6:29 AM Date of Service: 4/15/2020 10:00 AM Status: Signed    : Ashley Saucedo    Encounter addended by: Ashley Saucedo on: 5/11/2020  6:29 AM      Actions taken: Charge Capture section accepted

## 2021-07-03 NOTE — ADDENDUM NOTE
Addendum Note by Kimberly Olivia LPN at 1/26/2021 10:40 AM     Author: Kimberly Olivia LPN Service: -- Author Type: Licensed Nurse    Filed: 1/26/2021  2:40 PM Date of Service: 1/26/2021 10:40 AM Status: Signed    : Kimberly Olivia LPN (Licensed Nurse)    Encounter addended by: Kimberly Olivia LPN on: 1/26/2021  2:40 PM      Actions taken: Specialty comments modified, Clinical Note Signed

## 2021-07-03 NOTE — ADDENDUM NOTE
Addendum Note by Ashley Saucedo at 11/19/2020  1:20 PM     Author: Ashley Saucedo Service: -- Author Type: --    Filed: 11/23/2020  6:42 AM Date of Service: 11/19/2020  1:20 PM Status: Signed    : Ashley Saucedo    Encounter addended by: Ashley Saucedo on: 11/23/2020  6:42 AM      Actions taken: Charge Capture section accepted

## 2021-07-03 NOTE — ADDENDUM NOTE
Addendum Note by Ashley Saucedo at 8/28/2020  9:00 AM     Author: Ashley Saucedo Service: -- Author Type: --    Filed: 8/31/2020  7:11 AM Date of Service: 8/28/2020  9:00 AM Status: Signed    : Ashley Saucedo    Encounter addended by: Ashley Saucedo on: 8/31/2020  7:11 AM      Actions taken: Charge Capture section accepted

## 2021-07-03 NOTE — ADDENDUM NOTE
Addendum Note by Stephanie Magallanes CMA at 3/22/2019 10:35 AM     Author: Stephanie Magallanes CMA Service: -- Author Type: Certified Medical Assistant    Filed: 3/22/2019 12:00 PM Date of Service: 3/22/2019 10:35 AM Status: Signed    : Stephanie Magallanes CMA (Certified Medical Assistant)    Encounter addended by: Stephanie Magallanes CMA on: 3/22/2019 12:00 PM      Actions taken: Visit Navigator Flowsheet section accepted, Order   Reconciliation Section accessed, Sign clinical note

## 2021-07-03 NOTE — ADDENDUM NOTE
Addendum Note by Shanita Aguilera CMA at 11/5/2019 11:20 AM     Author: Shanita Aguilera CMA Service: -- Author Type: Certified Medical Assistant    Filed: 11/12/2019 12:04 PM Date of Service: 11/5/2019 11:20 AM Status: Signed    : Shanita Aguilera CMA (Certified Medical Assistant)    Encounter addended by: Shanita Aguilera CMA on: 11/12/2019 12:04 PM      Actions taken: Visit Navigator Flowsheet section accepted, Clinical   Note Signed

## 2021-07-03 NOTE — ADDENDUM NOTE
Addendum Note by Lola Talavera CNP at 12/19/2017 11:59 PM     Author: Lola Talavera CNP Service: -- Author Type: Nurse Practitioner    Filed: 12/29/2017  3:00 PM Date of Service: 12/19/2017 11:59 PM Status: Signed    : Lola Talavera CNP (Nurse Practitioner)    Encounter addended by: Lola Talavera CNP on: 12/29/2017  3:00 PM<BR>     Actions taken: Sign clinical note

## 2021-07-03 NOTE — ADDENDUM NOTE
Addendum Note by Kimberly Olivia LPN at 1/26/2021 10:40 AM     Author: Kimberly Olivia LPN Service: -- Author Type: Licensed Nurse    Filed: 1/26/2021 12:03 PM Date of Service: 1/26/2021 10:40 AM Status: Signed    : Kimberly Olivia LPN (Licensed Nurse)    Encounter addended by: Kimberly Olivia LPN on: 1/26/2021 12:03 PM      Actions taken: Order Reconciliation Section accessed, Clinical Note   Signed

## 2021-07-04 NOTE — ADDENDUM NOTE
Addendum Note by Ashley Saucedo at 3/17/2021 10:00 AM     Author: Ashley Saucedo Service: -- Author Type: --    Filed: 3/19/2021  8:39 AM Date of Service: 3/17/2021 10:00 AM Status: Signed    : Ashley Saucedo    Encounter addended by: Ashley Saucedo on: 3/19/2021  8:39 AM      Actions taken: Charge Capture section accepted

## 2021-07-07 NOTE — ADDENDUM NOTE
Addendum Note by Ashley Saucedo at 6/22/2021 10:40 AM     Author: Ashley Saucedo Service: -- Author Type: --    Filed: 6/29/2021  7:59 AM Date of Service: 6/22/2021 10:40 AM Status: Signed    : Ashley Saucedo    Encounter addended by: Ashley Saucedo on: 6/29/2021  7:59 AM      Actions taken: Charge Capture section accepted

## 2021-07-08 ENCOUNTER — COMMUNICATION - HEALTHEAST (OUTPATIENT)
Dept: PALLIATIVE MEDICINE | Facility: OTHER | Age: 56
End: 2021-07-08

## 2021-07-08 DIAGNOSIS — G43.701 CHRONIC MIGRAINE WITHOUT AURA WITH STATUS MIGRAINOSUS, NOT INTRACTABLE: ICD-10-CM

## 2021-07-08 RX ORDER — HYDROCODONE BITARTRATE AND ACETAMINOPHEN 10; 325 MG/1; MG/1
1 TABLET ORAL EVERY 4 HOURS PRN
Qty: 40 TABLET | Refills: 0 | Status: SHIPPED | OUTPATIENT
Start: 2021-07-08 | End: 2021-08-31

## 2021-07-08 NOTE — TELEPHONE ENCOUNTER
Telephone Encounter by Lola Talavera CNP at 7/8/2021  1:42 PM     Author: Lola Talavera CNP Service: -- Author Type: Nurse Practitioner    Filed: 7/8/2021  1:43 PM Encounter Date: 7/8/2021 Status: Signed    : Lola Talavera CNP (Nurse Practitioner)       Completed  Requested Prescriptions     Signed Prescriptions Disp Refills   ? HYDROcodone-acetaminophen (NORCO )  mg per tablet 40 tablet 0     Sig: Take 1 tablet by mouth every 4 (four) hours as needed for pain (max of 4/day and 40/28days).     Authorizing Provider: LOLA TALAVERA

## 2021-07-08 NOTE — TELEPHONE ENCOUNTER
Telephone Encounter by Reta Mcgraw RN at 7/8/2021 12:45 PM     Author: Reta Mcgraw RN Service: -- Author Type: Registered Nurse    Filed: 7/8/2021 12:51 PM Encounter Date: 7/8/2021 Status: Signed    : Rtea Mcgraw RN (Registered Nurse)       Medication being requested: Norco  Last visit date: 6/22/21.  Provider: JUANA  Next visit date: 9/14/21.  Provider: JUANA  Expected follow up: 2-3 months  MTM visit (Pain Center) date: No  UDT date: 1/2021  Agreement date: 1/2021   (Last fill date; name; strength; provider; MME; quantity):  05/27/2021 Hydrocodone-Acetamin  Mg Qty: 40 for 10 days Ja Hig  Pertinent between visit information about requested medication (telephone, mychart, prior authorization, concerns, comments): No  Script being sent to provider by nurse- dates and quantity:   7/8/21-8/5/21  Requested Prescriptions     Pending Prescriptions Disp Refills   ? HYDROcodone-acetaminophen (NORCO )  mg per tablet 40 tablet 0     Sig: Take 1 tablet by mouth every 4 (four) hours as needed for pain (max of 4/day and 40/28days).     Pharmacy cued: [facility]  Standing orders for withdrawal protocol implemented: JAN

## 2021-07-08 NOTE — TELEPHONE ENCOUNTER
Telephone Encounter by Lola Talavera CNP at 7/8/2021  5:24 PM     Author: Lola Talavera CNP Service: -- Author Type: Nurse Practitioner    Filed: 7/8/2021  5:26 PM Encounter Date: 7/8/2021 Status: Signed    : Lola Talavera CNP (Nurse Practitioner)       Thank you - I understand the risk. - The increase in the verapamil was increased external to the pain center to address BP - pain center is using for migraine HA. At this time I think we are ok.

## 2021-07-08 NOTE — TELEPHONE ENCOUNTER
Telephone Encounter by Reta Mcgraw, RN at 7/8/2021  2:58 PM     Author: Reta Mcgraw RN Service: -- Author Type: Registered Nurse    Filed: 7/8/2021  3:02 PM Encounter Date: 7/8/2021 Status: Signed    : Reta Mcgraw, RN (Registered Nurse)       Pharmacist from [facility] in [place] leaves a voicemail stating the clinic needs to acknowledge the interaction between Tizanidine and Verapamil by calling the pharmacy.  With this drug combination it can cause decrease in blood pressure and increase in drowsiness.  Lola Talavera, MARLYN please advise.

## 2021-07-09 NOTE — TELEPHONE ENCOUNTER
Telephone Encounter by Reta Mcgraw RN at 7/9/2021 12:30 PM     Author: Reta Mcgraw RN Service: -- Author Type: Registered Nurse    Filed: 7/9/2021 12:31 PM Encounter Date: 7/8/2021 Status: Signed    : Reta Mcgraw, RN (Registered Nurse)       RN spoke to the pharmacist and relayed the information from the provider.

## 2021-08-31 DIAGNOSIS — G43.701 CHRONIC MIGRAINE WITHOUT AURA WITH STATUS MIGRAINOSUS, NOT INTRACTABLE: ICD-10-CM

## 2021-08-31 RX ORDER — HYDROCODONE BITARTRATE AND ACETAMINOPHEN 10; 325 MG/1; MG/1
1 TABLET ORAL EVERY 4 HOURS PRN
Qty: 40 TABLET | Refills: 0 | Status: SHIPPED | OUTPATIENT
Start: 2021-08-31 | End: 2021-11-01

## 2021-08-31 NOTE — TELEPHONE ENCOUNTER
Received call from patient requesting refill(s) of  norco 10/325 mg    Last dispensed from pharmacy on: 7/9/21    Patient's last office/virtual visit by prescribing provider on 6/22/21  Next office/virtual appointment scheduled for 9/14/21    Last urine drug screen date 1/2021  Current opioid agreement on file (completed within the last year)  Date of opioid agreement: 01/2021    Pending Prescriptions:                       Disp   Refills    HYDROcodone-acetaminophen (NORCO)  *40 tab*0            Sig: Take 1 tablet by mouth every 4 hours as needed    Medication refill information reviewed.

## 2021-08-31 NOTE — TELEPHONE ENCOUNTER
reviewed  Last fill hydrocodone 7/8 #40 [facility] - no other prescribers or scripts     Completed minimum person of use time 8/31-9/30  Signed Prescriptions:                        Disp   Refills    HYDROcodone-acetaminophen (NORCO)  M*40 tab*0        Sig: Take 1 tablet by mouth every 4 hours as needed           (chronic pain)  Authorizing Provider: DANNI LYNCH

## 2021-09-14 ENCOUNTER — VIRTUAL VISIT (OUTPATIENT)
Dept: PALLIATIVE MEDICINE | Facility: OTHER | Age: 56
End: 2021-09-14
Payer: COMMERCIAL

## 2021-09-14 DIAGNOSIS — G43.719 INTRACTABLE CHRONIC MIGRAINE WITHOUT AURA AND WITHOUT STATUS MIGRAINOSUS: ICD-10-CM

## 2021-09-14 PROCEDURE — 99213 OFFICE O/P EST LOW 20 MIN: CPT | Mod: 95 | Performed by: NURSE PRACTITIONER

## 2021-09-14 RX ORDER — VERAPAMIL HYDROCHLORIDE 80 MG/1
80 TABLET ORAL 3 TIMES DAILY
Qty: 270 TABLET | Refills: 0 | Status: SHIPPED | OUTPATIENT
Start: 2021-09-20 | End: 2021-12-09

## 2021-09-14 ASSESSMENT — PAIN SCALES - GENERAL: PAINLEVEL: MODERATE PAIN (4)

## 2021-09-14 NOTE — PATIENT INSTRUCTIONS
_____  Plan:  _____  Thank you for participating in the video visit - Here is the Plan of Care / NextSteps:    Contact [phone] to reserve a time. You need to follow up by: 2-3 months    Communicating with us:  Please call Monday-Friday for problems or questions - leave a message and one of the clinical support staff (CSS) will help to get things figured out. The number is [phone].    You may also opt to communicate through [employer].      Rehabilitation: Remain active continue your home exercises and stretches.    Integrative: you are continuing medical cannabis.     Medication prescribed / to be continued: Continue the hydrocodone, ketorolac, tizanidine,     Signed Prescriptions:                        Disp   Refills    verapamil (CALAN) 80 MG tablet             270 ta*0        Sig: Take 1 tablet (80 mg) by mouth 3 times daily  Authorizing Provider: DANNI LYNCH

## 2021-09-14 NOTE — PROGRESS NOTES
Medical Record review and prep:  Start Time: 0751  End Time: 0756  TT: 5    Video Start Time: 10:56 AM  Video-Visit Details    Type of service:  Video Visit    Video End Time:11:13 AM    Originating Location (pt. Location): Home    Distant Location (provider location):  [facility]     Platform used for Video Visit: Carisa Valdez is a 56 year old person last evaluated 9/14/21.  H/O IBS, anxiety. Is being evaluated for migraine.      Major issues:  1. Intractable chronic migraine without aura and without status migrainosus      Patient Active Problem List   Diagnosis     Migraine     Myalgia     Plantar fasciitis, bilateral     Hip pain, chronic, left     Lumbago       _____  HPI:  _____    Location/Laterality of the pain: recent issues with bursitis in her right shoulder and about a week ago she has struggled with low back  Aggravating factors: washing windows  Alleviating factors: time to adjust    Headache:   Frequency of HA: daily, migraines have not been as bad or as frequent  Duration of HA: all day, constant, migraines will last 2days  Quality of HA: pulsing, throbbing    Location of HA: suboccipital w/ migraine then moves to the center of her head - neck pain,  Head - center of the head and back of the skull. Her neck will ache when she has a migraine  Severity of HA: 4/10  Provoked by: weather change  Aura description: peripheral twinkling; black dots with the migraine, dizziness (has not bother her for some time); she will get forgetful and she will yawn a lot before a HA  #of HA days per mo: daily baseline HA  Rehabilitation: She has completed Craniosacral therapy and we will monitor for any changes over time. Hx of PT. Self massage  Interventional: TPI; Botox injection (did not offer impact and is not considered to be a option)  Prophylactic treatment: verapmil , medical cannabis  Abortive Medication for HA: not a candidate r/t the CVA, medical cannabis  Treated: [place] and  Toradol (assistive), Dark room, sleep, medical cannabis, OTC topical  Associated Mainfestiation of the migraine: Photophobia, watery eyes  Not tried acupuncture afraid of needles, not interested in chiropractic    Severity: Today: 4    Any New pain, injuries, falls: flair up  Since last visit pain has: worsened but ok      Functional Symptoms: Pain interferes with:    Patient describes their pain as her head for location.      Pain score: 10     Constant      What does your pain feel like: Throbbing     Does the pain interfere with:     Work: NA     Walking/distance: N     Sleep: Y     Daily activities: N     Relationships/social life: N     Mood: Y     F=5    Sleep: no changes has a different sleep schedule - she is having some fatigue but she is managing  Walking:  Ambulation/Transfer: Pt is ambulatory. Transfers independently. Stairs no issues    Work / Social indicators for health:   Animal Care: dog - she is walking the dog  Family:  has been doing well    ADL's: she has been doing pretty well.   Toileting: no issues with constipation  Bathing: washing her hair is tough she shoulders will get tired    Additional social indicators for health:  Housing: secure  Concentration: no complaints  Transportation: no concerns  Relationships/Social: no specific fun activities planned    Impact of pain treatments:  Patient reports function has improved with current pain treatment: yes      Pain Plan of Care Review:  Medication:  Last opioid dose was hydrocodone 09/14/21 @ 6 am    Medication changes: no  Medication side/adverse effects: no  Aberrant behavior: no  Considerations: no      Current Outpatient Medications:      aspirin 81 mg chewable tablet, [ASPIRIN 81 MG CHEWABLE TABLET] Chew 81 mg daily., Disp: , Rfl:      atorvastatin (LIPITOR) 20 MG tablet, [ATORVASTATIN (LIPITOR) 20 MG TABLET] Take 20 mg by mouth., Disp: , Rfl:      CHOLECALCIFEROL, VITAMIN D3, (VITAMIN D3 ORAL), [CHOLECALCIFEROL, VITAMIN  D3, (VITAMIN D3 ORAL)] Take by mouth daily. , Disp: , Rfl:      DOCOSAHEXANOIC ACID/EPA (FISH OIL ORAL), [DOCOSAHEXANOIC ACID/EPA (FISH OIL ORAL)] Take by mouth., Disp: , Rfl:      fluoxetine HCl (PROZAC ORAL), [FLUOXETINE HCL (PROZAC ORAL)] Take 60 mg by mouth., Disp: , Rfl:      GARLIC ORAL, [GARLIC ORAL] Take by mouth., Disp: , Rfl:      hydroCHLOROthiazide (HYDRODIURIL) 12.5 MG tablet, [HYDROCHLOROTHIAZIDE (HYDRODIURIL) 12.5 MG TABLET] Take 12.5 mg by mouth daily., Disp: , Rfl:      HYDROcodone-acetaminophen (NORCO)  MG per tablet, Take 1 tablet by mouth every 4 hours as needed (chronic pain), Disp: 40 tablet, Rfl: 0 - continued     ketorolac (TORADOL) 10 mg tablet, [KETOROLAC (TORADOL) 10 MG TABLET] Take 1 tablet (10 mg total) by mouth 2 (two) times a day as needed for pain (max of 10/28 days)., Disp: 10 tablet, Rfl: 2 - continued to have available for PRN use     ketorolac (TORADOL) 10 mg tablet, [KETOROLAC (TORADOL) 10 MG TABLET] Take 1 tablet (10 mg total) by mouth 2 (two) times a day as needed for pain (max of 10/28 days)., Disp: 10 tablet, Rfl: 2     L.ACID/L.CASEI/B.BIF/B.ERIC/FOS (PROBIOTIC BLEND ORAL), [L.ACID/L.CASEI/B.BIF/B.ERIC/FOS (PROBIOTIC BLEND ORAL)] Take by mouth., Disp: , Rfl:      losartan (COZAAR) 50 MG tablet, [LOSARTAN (COZAAR) 50 MG TABLET] Take 100 mg by mouth daily., Disp: , Rfl:      melatonin 3 mg TbER, [MELATONIN 3 MG TBER] Take 6 mg by mouth., Disp: , Rfl:      multivitamin capsule, [MULTIVITAMIN CAPSULE] Take 1 capsule by mouth daily., Disp: , Rfl:      pantoprazole (PROTONIX) 20 MG tablet, [PANTOPRAZOLE (PROTONIX) 20 MG TABLET] Take 20 mg by mouth daily. , Disp: , Rfl:      polyethylene glycol (GLYCOLAX) 17 gram/dose powder, [POLYETHYLENE GLYCOL (GLYCOLAX) 17 GRAM/DOSE POWDER] Take 17 g by mouth daily as needed. , Disp: , Rfl:      prochlorperazine (COMPAZINE) 10 MG tablet, [PROCHLORPERAZINE (COMPAZINE) 10 MG TABLET] Take 1 tablet (10 mg total) by mouth every 6 (six) hours as  needed for nausea., Disp: 30 tablet, Rfl: 0     TiZANidine (ZANAFLEX) 4 MG capsule, [TIZANIDINE (ZANAFLEX) 4 MG CAPSULE] Take 3 capsules (12 mg total) by mouth at bedtime as needed for muscle spasms., Disp: 270 capsule, Rfl: 0 - continued     UNABLE TO FIND, [UNABLE TO FIND] Medical Cannabis - chronic intractable Pain - Migraine  Oral solution: 2 mL by mouth once daily  Capsules: 1 capsule by mouth at bedtime  Vape; 1-4 puffs inhaled as needed, Disp: , Rfl:  - continued     verapamiL (CALAN) 80 MG tablet, [VERAPAMIL (CALAN) 80 MG TABLET] Take 1 tablet (80 mg total) by mouth 3 (three) times a day., Disp: 270 tablet, Rfl: 0 - continued      :   9/14/2021 Reviewed to aid with decision regarding medication management  Completed minimum person of use time 8/31-9/30  Signed Prescriptions:                        Disp   Refills    HYDROcodone-acetaminophen (NORCO)  M*40 tab*0        Sig: Take 1 tablet by mouth every 4 hours as needed           (chronic pain)  Authorizing Provider: DANNI LYNCH       Last script:  Date: 8/31/21 due 9/30/21  Medication: hydrocodone  Dose: 10/325mg  Dispensed: 40  Prescriber: jania    Scheduled medication from other professionals: -  Medication(s): -    Expected dispensing: ok    Medical Cannabis:  Qualifying Condition: Chronic Intractable Pain - migrain HA of note not qualified for but getting benefit for IBS  Qualifying Provider: Sadaf as of 8/28/20  [facility]  Email: [email or web address]     Products:  Caps  vape    Ratio:  tangerine    Benefit (1- no benefit; 7- a great deal of benefitts) from taking medical cannabis 6  List  benefit(s) pain relief, less anxiety improved IBS    Negative effect (s) (1- no negative effects; 7- a great deal of negative effects) 2  List negative effect(s) coughing with the vape    Specific negative effect(s)  Physical side effects: stomach upset, fatigue, headache, blurred vision: +  Mental/cognitive side effects: mental clouding,  confusion, depression: no  Worsening of symptoms related to the condition being treated; cannabis is not working well over time: no  Common side effects include dizziness, dry mouth, lightheadedness, drowsiness, cough, bronchitis: cough, dry mouth  Nausea, vomiting, alleviated with hot showers: no    Exercising safety with driving, operate machinery:cautious  Keep medication secure and in their original containers safe practices things are locked  Difficulty/inconvenience in accessing medical cannabis no    Provider clinical observations regarding medical cannabis use in this patient:*doing very well  Program improvements or additional information different varieties to chose from; different products     Rehabilitation:  Home exercise program: walking the dog    _________  Objective:  _________  Vitals:    09/14/21 1028   PainSc: Moderate Pain (4)       Constitutional:  Pleasant and cooperative person who presents alone today.  Psychiatric: Mood and affect are appropriate for the situation, setting and topic of discussion.  Patient does not appear sedated.  Integumentary:  Observed skin WNL.   HEENT: EOM's grossly intact.    Chest: Breathing is non-labored.   Neurological:  Alert and oriented in all spheres including: time, place, person and situation.    Diagnostics:   Lab:  Last UDT on   Reviewed note 1/26/21 Last opioid dose was Norco at 6am on 1/26/2021. Pt is also on the medical cannabis program     UDT:  Detected mariajuana metabolite (on medical cannabis program); Detected hydrocodone and metabolites (expected).        ______________  Assessment:  ___________  Josy ZULETA  is a 56 year old person H/O CVA, anxiety, IBS.    Video evaluation for migraine HA     Due to CVA she is not a candidate for triptans. Failed Botox. She has completed craniosacral therapy. She is doing a HEP from PT.   Working with the medical cannabis program.      She has done exceptionally well with the current combination of  medication and medical cannabis. She does not always fill the hydrocodone monthly. She asked her primary about the hydrocodone - indicates there was concern about only being able to get 20 - the statement was unsettling. Will explore further with the patient.       Continue video visits     *Universal Precautions:    CSA/ UDT 01/2021   Pharmacy- as documented    Count- #4 remaining hydrocodone at appt 7/17/15   Psychological evaluation: DA 9/18/15  MME- 40  1/22/19 MME=10  03/21/2019 MME- 10   05/23/2019 MME- 10  8/29/20 MME up to 40 receiving 40pills/month  Pharmacogenetic testing- n/a  MTM: n/a.  Medical cannabis +      Management of opioid medication is inherently a moderate to high complex medial interaction based on the risk management required at each contact r/t risks and side effects.    ______  Plan:  _____  Thank you for participating in the video visit - Here is the Plan of Care / NextSteps:    Contact [phone] to reserve a time. You need to follow up by: 2-3 months    Communicating with us:  Please call Monday-Friday for problems or questions - leave a message and one of the clinical support staff (CSS) will help to get things figured out. The number is [phone].    You may also opt to communicate through [employer].      Rehabilitation: Remain active continue your home exercises and stretches.    Integrative: you are continuing medical cannabis.     Medication prescribed / to be continued: Continue the hydrocodone, ketorolac, tizanidine,     Signed Prescriptions:                        Disp   Refills    verapamil (CALAN) 80 MG tablet             270 ta*0        Sig: Take 1 tablet (80 mg) by mouth 3 times daily  Authorizing Provider: DANNI LYNCH FNP-BC  [address] [facility] [address]  [phone]  [phone]      DASHA Castillo CNP

## 2021-09-14 NOTE — PROGRESS NOTES
Patient presents to the clinic today for a follow up with DASHA Castillo CNP  regarding Pain Managment    Josy is a 56 year old who is being evaluated via a billable video visit.      How would you like to obtain your AVS? Mail a copy  If the video visit is dropped, the invitation should be resent by: Text to cell phone: [phone]  Will anyone else be joining your video visit? No         Is Pt currently in [place]? Yes    NOTE:  If Pt is not in [place], Appointment needs to be canceled and rescheduled.     Patient describes their pain as her head for location.     Pain score: 10    Constant     What does your pain feel like: Throbbing    Does the pain interfere with:    Work: NA    Walking/distance: N    Sleep: Y    Daily activities: N    Relationships/social life: N    Mood: Y    F=5    UDT/CSA  01/2021    Stephanie Geiger MA  [facility]

## 2021-09-14 NOTE — LETTER
9/14/2021         RE: Josy Valdez  [address] [facility] [address]        Dear Colleague,    Thank you for referring your patient, Josy Valdez, to the [facility]. Please see a copy of my visit note below.    Medical Record review and prep:  Start Time: 0751  End Time: 0756  TT: 5    Video Start Time: 10:56 AM  Video-Visit Details    Type of service:  Video Visit    Video End Time:11:13 AM    Originating Location (pt. Location): Home    Distant Location (provider location):  [facility]     Platform used for Video Visit: Class Messenger      Josy Valdez is a 56 year old person last evaluated 9/14/21.  H/O IBS, anxiety. Is being evaluated for migraine.      Major issues:  1. Intractable chronic migraine without aura and without status migrainosus      Patient Active Problem List   Diagnosis     Migraine     Myalgia     Plantar fasciitis, bilateral     Hip pain, chronic, left     Lumbago       _____  HPI:  _____    Location/Laterality of the pain: recent issues with bursitis in her right shoulder and about a week ago she has struggled with low back  Aggravating factors: washing windows  Alleviating factors: time to adjust    Headache:   Frequency of HA: daily, migraines have not been as bad or as frequent  Duration of HA: all day, constant, migraines will last 2days  Quality of HA: pulsing, throbbing    Location of HA: suboccipital w/ migraine then moves to the center of her head - neck pain,  Head - center of the head and back of the skull. Her neck will ache when she has a migraine  Severity of HA: 4/10  Provoked by: weather change  Aura description: peripheral twinkling; black dots with the migraine, dizziness (has not bother her for some time); she will get forgetful and she will yawn a lot before a HA  #of HA days per mo: daily baseline HA  Rehabilitation: She has completed Craniosacral therapy and we will monitor for any changes over time. Hx of PT. Self  massage  Interventional: TPI; Botox injection (did not offer impact and is not considered to be a option)  Prophylactic treatment: verapmil , medical cannabis  Abortive Medication for HA: not a candidate r/t the CVA, medical cannabis  Treated: Norco and Toradol (assistive), Dark room, sleep, medical cannabis, OTC topical  Associated Mainfestiation of the migraine: Photophobia, watery eyes  Not tried acupuncture afraid of needles, not interested in chiropractic    Severity: Today: 4    Any New pain, injuries, falls: flair up  Since last visit pain has: worsened but ok      Functional Symptoms: Pain interferes with:    Patient describes their pain as her head for location.      Pain score: 10     Constant      What does your pain feel like: Throbbing     Does the pain interfere with:     Work: NA     Walking/distance: N     Sleep: Y     Daily activities: N     Relationships/social life: N     Mood: Y     F=5    Sleep: no changes has a different sleep schedule - she is having some fatigue but she is managing  Walking:  Ambulation/Transfer: Pt is ambulatory. Transfers independently. Stairs no issues    Work / Social indicators for health:   Animal Care: dog - she is walking the dog  Family:  has been doing well    ADL's: she has been doing pretty well.   Toileting: no issues with constipation  Bathing: washing her hair is tough she shoulders will get tired    Additional social indicators for health:  Housing: secure  Concentration: no complaints  Transportation: no concerns  Relationships/Social: no specific fun activities planned    Impact of pain treatments:  Patient reports function has improved with current pain treatment: yes      Pain Plan of Care Review:  Medication:  Last opioid dose was hydrocodone 09/14/21 @ 6 am    Medication changes: no  Medication side/adverse effects: no  Aberrant behavior: no  Considerations: no      Current Outpatient Medications:      aspirin 81 mg chewable tablet,  [ASPIRIN 81 MG CHEWABLE TABLET] Chew 81 mg daily., Disp: , Rfl:      atorvastatin (LIPITOR) 20 MG tablet, [ATORVASTATIN (LIPITOR) 20 MG TABLET] Take 20 mg by mouth., Disp: , Rfl:      CHOLECALCIFEROL, VITAMIN D3, (VITAMIN D3 ORAL), [CHOLECALCIFEROL, VITAMIN D3, (VITAMIN D3 ORAL)] Take by mouth daily. , Disp: , Rfl:      DOCOSAHEXANOIC ACID/EPA (FISH OIL ORAL), [DOCOSAHEXANOIC ACID/EPA (FISH OIL ORAL)] Take by mouth., Disp: , Rfl:      fluoxetine HCl (PROZAC ORAL), [FLUOXETINE HCL (PROZAC ORAL)] Take 60 mg by mouth., Disp: , Rfl:      GARLIC ORAL, [GARLIC ORAL] Take by mouth., Disp: , Rfl:      hydroCHLOROthiazide (HYDRODIURIL) 12.5 MG tablet, [HYDROCHLOROTHIAZIDE (HYDRODIURIL) 12.5 MG TABLET] Take 12.5 mg by mouth daily., Disp: , Rfl:      HYDROcodone-acetaminophen (NORCO)  MG per tablet, Take 1 tablet by mouth every 4 hours as needed (chronic pain), Disp: 40 tablet, Rfl: 0 - continued     ketorolac (TORADOL) 10 mg tablet, [KETOROLAC (TORADOL) 10 MG TABLET] Take 1 tablet (10 mg total) by mouth 2 (two) times a day as needed for pain (max of 10/28 days)., Disp: 10 tablet, Rfl: 2 - continued to have available for PRN use     ketorolac (TORADOL) 10 mg tablet, [KETOROLAC (TORADOL) 10 MG TABLET] Take 1 tablet (10 mg total) by mouth 2 (two) times a day as needed for pain (max of 10/28 days)., Disp: 10 tablet, Rfl: 2     L.ACID/L.CASEI/B.BIF/B.ERIC/FOS (PROBIOTIC BLEND ORAL), [L.ACID/L.CASEI/B.BIF/B.ERIC/FOS (PROBIOTIC BLEND ORAL)] Take by mouth., Disp: , Rfl:      losartan (COZAAR) 50 MG tablet, [LOSARTAN (COZAAR) 50 MG TABLET] Take 100 mg by mouth daily., Disp: , Rfl:      melatonin 3 mg TbER, [MELATONIN 3 MG TBER] Take 6 mg by mouth., Disp: , Rfl:      multivitamin capsule, [MULTIVITAMIN CAPSULE] Take 1 capsule by mouth daily., Disp: , Rfl:      pantoprazole (PROTONIX) 20 MG tablet, [PANTOPRAZOLE (PROTONIX) 20 MG TABLET] Take 20 mg by mouth daily. , Disp: , Rfl:      polyethylene glycol (GLYCOLAX) 17 gram/dose  powder, [POLYETHYLENE GLYCOL (GLYCOLAX) 17 GRAM/DOSE POWDER] Take 17 g by mouth daily as needed. , Disp: , Rfl:      prochlorperazine (COMPAZINE) 10 MG tablet, [PROCHLORPERAZINE (COMPAZINE) 10 MG TABLET] Take 1 tablet (10 mg total) by mouth every 6 (six) hours as needed for nausea., Disp: 30 tablet, Rfl: 0     TiZANidine (ZANAFLEX) 4 MG capsule, [TIZANIDINE (ZANAFLEX) 4 MG CAPSULE] Take 3 capsules (12 mg total) by mouth at bedtime as needed for muscle spasms., Disp: 270 capsule, Rfl: 0 - continued     UNABLE TO FIND, [UNABLE TO FIND] Medical Cannabis - chronic intractable Pain - Migraine  Oral solution: 2 mL by mouth once daily  Capsules: 1 capsule by mouth at bedtime  Vape; 1-4 puffs inhaled as needed, Disp: , Rfl:  - continued     verapamiL (CALAN) 80 MG tablet, [VERAPAMIL (CALAN) 80 MG TABLET] Take 1 tablet (80 mg total) by mouth 3 (three) times a day., Disp: 270 tablet, Rfl: 0 - continued      :   9/14/2021 Reviewed to aid with decision regarding medication management  Completed minimum person of use time 8/31-9/30  Signed Prescriptions:                        Disp   Refills    HYDROcodone-acetaminophen (NORCO)  M*40 tab*0        Sig: Take 1 tablet by mouth every 4 hours as needed           (chronic pain)  Authorizing Provider: DANNI LYNCH       Last script:  Date: 8/31/21 due 9/30/21  Medication: hydrocodone  Dose: 10/325mg  Dispensed: 40  Prescriber: jania    Scheduled medication from other professionals: -  Medication(s): -    Expected dispensing: ok    Medical Cannabis:  Qualifying Condition: Chronic Intractable Pain - migrain HA of note not qualified for but getting benefit for IBS  Qualifying Provider: Sadaf as of 8/28/20  [facility]  Email: [email or web address]     Products:  Caps  vape    Ratio:  tangerine    Benefit (1- no benefit; 7- a great deal of benefitts) from taking medical cannabis 6  List  benefit(s) pain relief, less anxiety improved IBS    Negative effect (s) (1- no  negative effects; 7- a great deal of negative effects) 2  List negative effect(s) coughing with the vape    Specific negative effect(s)  Physical side effects: stomach upset, fatigue, headache, blurred vision: +  Mental/cognitive side effects: mental clouding, confusion, depression: no  Worsening of symptoms related to the condition being treated; cannabis is not working well over time: no  Common side effects include dizziness, dry mouth, lightheadedness, drowsiness, cough, bronchitis: cough, dry mouth  Nausea, vomiting, alleviated with hot showers: no    Exercising safety with driving, operate machinery:cautious  Keep medication secure and in their original containers safe practices things are locked  Difficulty/inconvenience in accessing medical cannabis no    Provider clinical observations regarding medical cannabis use in this patient:*doing very well  Program improvements or additional information different varieties to chose from; different products     Rehabilitation:  Home exercise program: walking the dog    _________  Objective:  _________  Vitals:    09/14/21 1028   PainSc: Moderate Pain (4)       Constitutional:  Pleasant and cooperative person who presents alone today.  Psychiatric: Mood and affect are appropriate for the situation, setting and topic of discussion.  Patient does not appear sedated.  Integumentary:  Observed skin WNL.   HEENT: EOM's grossly intact.    Chest: Breathing is non-labored.   Neurological:  Alert and oriented in all spheres including: time, place, person and situation.    Diagnostics:   Lab:  Last UDT on   Reviewed note 1/26/21 Last opioid dose was Norco at 6am on 1/26/2021. Pt is also on the medical cannabis program     UDT:  Detected mariajuana metabolite (on medical cannabis program); Detected hydrocodone and metabolites (expected).        ______________  Assessment:  ___________  Josy SONG Valdez is a 56 year old person H/O CVA, anxiety, IBS.    Video evaluation for migraine  HA     Due to CVA she is not a candidate for triptans. Failed Botox. She has completed craniosacral therapy. She is doing a HEP from PT.   Working with the medical cannabis program.      She has done exceptionally well with the current combination of medication and medical cannabis. She does not always fill the hydrocodone monthly. She asked her primary about the hydrocodone - indicates there was concern about only being able to get 20 - the statement was unsettling. Will explore further with the patient.       Continue video visits     *Universal Precautions:    CSA/ UDT 01/2021   Pharmacy- as documented    Count- #4 remaining hydrocodone at appt 7/17/15   Psychological evaluation: DA 9/18/15  MME- 40  1/22/19 MME=10  03/21/2019 MME- 10   05/23/2019 MME- 10  8/29/20 MME up to 40 receiving 40pills/month  Pharmacogenetic testing- n/a  MTM: n/a.  Medical cannabis +      Management of opioid medication is inherently a moderate to high complex medial interaction based on the risk management required at each contact r/t risks and side effects.    ______  Plan:  _____  Thank you for participating in the video visit - Here is the Plan of Care / NextSteps:    Contact [phone] to reserve a time. You need to follow up by: 2-3 months    Communicating with us:  Please call Monday-Friday for problems or questions - leave a message and one of the clinical support staff (CSS) will help to get things figured out. The number is [phone].    You may also opt to communicate through [email or web address].      Rehabilitation: Remain active continue your home exercises and stretches.    Integrative: you are continuing medical cannabis.     Medication prescribed / to be continued: Continue the hydrocodone, ketorolac, tizanidine,     Signed Prescriptions:                        Disp   Refills    verapamil (CALAN) 80 MG tablet             270 ta*0        Sig: Take 1 tablet (80 mg) by mouth 3 times daily  Authorizing Provider: CRIS  DANNI LOU FNP-BC  [address] [facility] [address]  [phone]  [phone]      DASHA Castillo CNP    Patient presents to the clinic today for a follow up with DASHA Castillo CNP  regarding Pain Managment    Josy is a 56 year old who is being evaluated via a billable video visit.      How would you like to obtain your AVS? Mail a copy  If the video visit is dropped, the invitation should be resent by: Text to cell phone: [phone]  Will anyone else be joining your video visit? No         Is Pt currently in [place]? Yes    NOTE:  If Pt is not in [place], Appointment needs to be canceled and rescheduled.     Patient describes their pain as her head for location.     Pain score: 10    Constant     What does your pain feel like: Throbbing    Does the pain interfere with:    Work: NA    Walking/distance: N    Sleep: Y    Daily activities: N    Relationships/social life: N    Mood: Y    F=5    UDT/CSA  01/2021    Stephanie Geiger MA  [facility]       Again, thank you for allowing me to participate in the care of your patient.        Sincerely,        DASHA Castillo CNP

## 2021-11-01 ENCOUNTER — TELEPHONE (OUTPATIENT)
Dept: PALLIATIVE MEDICINE | Facility: OTHER | Age: 56
End: 2021-11-01
Payer: COMMERCIAL

## 2021-11-01 DIAGNOSIS — G43.701 CHRONIC MIGRAINE WITHOUT AURA WITH STATUS MIGRAINOSUS, NOT INTRACTABLE: ICD-10-CM

## 2021-11-01 NOTE — TELEPHONE ENCOUNTER
Received call from patient requesting refill(s) of HYDROcodone-acetaminophen (NORCO)  MG per tablet ()     Last dispensed from pharmacy on 21 (confirmed with pharmacy)    Patient's last office/virtual visit by prescribing provider on 21    Next office/virtual appointment scheduled for 21    Last urine drug screen date 21    Current opioid agreement on file (completed within the last year) Yes Date of opioid agreement: 21    E-prescribe to   [place] [address] [place] [facility]  [address] [facility] [address]  Phone: [phone] Fax: [phone]    Will route to nursing [place] for review and preparation of prescription(s).     Kell Flores [facility] [place]

## 2021-11-01 NOTE — TELEPHONE ENCOUNTER
Pending Prescriptions:                       Disp   Refills    HYDROcodone-acetaminophen (NORCO)  *40 tab*0            Sig: Take 1 tablet by mouth every 4 hours as needed           (chronic pain)    [facility]

## 2021-11-02 RX ORDER — HYDROCODONE BITARTRATE AND ACETAMINOPHEN 10; 325 MG/1; MG/1
1 TABLET ORAL EVERY 4 HOURS PRN
Qty: 40 TABLET | Refills: 0 | Status: SHIPPED | OUTPATIENT
Start: 2021-11-02 | End: 2021-11-30

## 2021-11-02 NOTE — TELEPHONE ENCOUNTER
Chart review of last script  HYDROcodone-acetaminophen (NORCO)  MG per tablet 40 tablet 0 8/31/2021 9/30/2021 No   Sig - Route: Take 1 tablet by mouth every 4 hours as needed (chronic pain) - Oral       :   11/2/2021 Reviewed to aid with decision regarding medication management  Last script:  Date: 8/31  Medication: hydrocodone  Dose: 10/325mg   Dispensed: 40   Prescriber: jania    Completed  Signed Prescriptions:                        Disp   Refills    HYDROcodone-acetaminophen (NORCO)  M*40 tab*0        Sig: Take 1 tablet by mouth every 4 hours as needed           (chronic pain) Start 11/2/21  Authorizing Provider: DANNI LYNCH    Pt continues with medical cannabis and the opioid remains part of the plan of care tends to us sparingly

## 2021-11-04 DIAGNOSIS — G43.719 INTRACTABLE CHRONIC MIGRAINE WITHOUT AURA AND WITHOUT STATUS MIGRAINOSUS: ICD-10-CM

## 2021-11-04 RX ORDER — TIZANIDINE HYDROCHLORIDE 4 MG/1
12 CAPSULE, GELATIN COATED ORAL
Qty: 270 CAPSULE | Refills: 0 | Status: SHIPPED | OUTPATIENT
Start: 2021-11-04 | End: 2022-01-24

## 2021-11-04 NOTE — TELEPHONE ENCOUNTER
Completed  Signed Prescriptions:                        Disp   Refills    tiZANidine (ZANAFLEX) 4 MG capsule         270 ca*0        Sig: Take 3 capsules (12 mg) by mouth nightly as needed           for muscle spasms 11/4-2/2/22  Authorizing Provider: DANNI LYNCH

## 2021-11-04 NOTE — TELEPHONE ENCOUNTER
Received fax from pharmacy requesting refill(s) for TiZANidine (ZANAFLEX) 4 MG capsule     Last refilled on 6/22/21    Pt last seen on 9/14/21  Next appt scheduled for 11/30/21    [facility] to:       [facility] [address] [place] [facility] [address]       Will facilitate refill.

## 2021-11-30 ENCOUNTER — VIRTUAL VISIT (OUTPATIENT)
Dept: PALLIATIVE MEDICINE | Facility: OTHER | Age: 56
End: 2021-11-30
Payer: COMMERCIAL

## 2021-11-30 DIAGNOSIS — G43.701 CHRONIC MIGRAINE WITHOUT AURA WITH STATUS MIGRAINOSUS, NOT INTRACTABLE: ICD-10-CM

## 2021-11-30 DIAGNOSIS — G43.719 INTRACTABLE CHRONIC MIGRAINE WITHOUT AURA AND WITHOUT STATUS MIGRAINOSUS: ICD-10-CM

## 2021-11-30 PROCEDURE — 99214 OFFICE O/P EST MOD 30 MIN: CPT | Mod: 95 | Performed by: NURSE PRACTITIONER

## 2021-11-30 RX ORDER — HYDROCODONE BITARTRATE AND ACETAMINOPHEN 10; 325 MG/1; MG/1
1 TABLET ORAL EVERY 4 HOURS PRN
Qty: 40 TABLET | Refills: 0 | Status: SHIPPED | OUTPATIENT
Start: 2021-11-30 | End: 2021-12-31

## 2021-11-30 ASSESSMENT — PAIN SCALES - GENERAL: PAINLEVEL: MODERATE PAIN (4)

## 2021-11-30 NOTE — LETTER
11/30/2021         RE: Josy Valdez  [address] [facility] [address] [facility] [address]        Dear Colleague,    Thank you for referring your patient, Josy Valdez, to the [facility]. Please see a copy of my visit note below.    Medical Record review and prep:  Start Time: 0741  End Time: 0744   TT: 3      Video Start Time: 11:18 AM  Video-Visit Details    Type of service:  Video Visit    Video End Time:11:47 AM    Originating Location (pt. Location): Home    Distant Location (provider location):  [facility]     Platform used for Video Visit: [employer]      Josy Valdez is a 56 year old person last evaluated 9/14/21.  H/O IBS, anxiety. Is being evaluated for migraine.         Major issues:  1. Chronic migraine without aura with status migrainosus, not intractable    2. Intractable chronic migraine without aura and without status migrainosus      Patient Active Problem List   Diagnosis     Migraine     Myalgia     Plantar fasciitis, bilateral     Hip pain, chronic, left     Lumbago       _____  HPI:  _____    MyChart/Communications: she has not been able to get on mychart    Location/Laterality of the pain: right shoulder (improved) and  low back (comes and goes)  Quality: stiffness  Aggravating factors: chores above her head (reaching up to wash hair)  Alleviating factors: pacing, medication, heating pad, medical cannabis     Headache: she is having some trouble telling. Generally thinks things are about the same. Nausea with HA  Frequency of HA: daily, migraines have not been as bad or as frequent  Duration of HA: all day, constant, migraines will last 2days  Quality of HA: pulsing, throbbing    Location of HA: suboccipital w/ migraine then moves to the center of her head - neck pain,  Head - center of the head and back of the skull. Her neck will ache when she has a migraine  Severity of HA: 4/10  Provoked by: weather change  Aura description: peripheral twinkling; black dots  with the migraine, dizziness (has not bother her for some time); she will get forgetful and she will yawn a lot before a HA  #of HA days per mo: daily baseline HA  Rehabilitation: She has completed Craniosacral therapy and we will monitor for any changes over time. Hx of PT. Self massage  Interventional: TPI; Botox injection (did not offer impact and is not considered to be a option)  Prophylactic treatment: verapmil , medical cannabis  Abortive Medication for HA: not a candidate r/t the CVA, medical cannabis  Treated: Norco and Toradol (assistive), Dark room, sleep, medical cannabis, OTC topical  Associated Mainfestiation of the migraine: Photophobia, watery eyes  Not tried acupuncture afraid of needles, not interested in chiropractic     Any New pain, injuries, falls: acute pain symptoms from a growth  Since last visit pain has: stable generally    Associated symptoms:   Functional Symptoms: Pain interferes with:  Sleep: not bad a little better than usual recently  Walking:  Ambulation/Transfer: Pt is ambulatory. Transfers independently.     ADL's/IADL's: she is getting her chores done - she can over do but is able to pace her activities. She is not reporting anything her  needs to help her with     Social indicators for health:  Housing: secure  Communications: able to manage video visits  Transportation: managing transportation  Relationships/Social: They had a intimate Thanksgiving. They are planning for a face-time  holiday    Impact of pain treatments:  Patient reports function has improved with current pain treatment:yes      Pain Plan of Care Review:  Medication changes: no  Medication side/adverse effects: no  Considerations: no      Current Outpatient Medications:      aspirin 81 mg chewable tablet, [ASPIRIN 81 MG CHEWABLE TABLET] Chew 81 mg daily., Disp: , Rfl:      atorvastatin (LIPITOR) 20 MG tablet, [ATORVASTATIN (LIPITOR) 20 MG TABLET] Take 20 mg by mouth., Disp: , Rfl:      CHOLECALCIFEROL,  VITAMIN D3, (VITAMIN D3 ORAL), [CHOLECALCIFEROL, VITAMIN D3, (VITAMIN D3 ORAL)] Take by mouth daily. , Disp: , Rfl:      fluoxetine HCl (PROZAC ORAL), [FLUOXETINE HCL (PROZAC ORAL)] Take 60 mg by mouth., Disp: , Rfl:      hydroCHLOROthiazide (HYDRODIURIL) 12.5 MG tablet, [HYDROCHLOROTHIAZIDE (HYDRODIURIL) 12.5 MG TABLET] Take 12.5 mg by mouth daily., Disp: , Rfl:      HYDROcodone-acetaminophen (NORCO)  MG per tablet, Take 1 tablet by mouth every 4 hours as needed (chronic pain) Start 11/2/21, Disp: 40 tablet, Rfl: 0 - she could use a refill on the hydrocodone     ketorolac (TORADOL) 10 mg tablet, [KETOROLAC (TORADOL) 10 MG TABLET] Take 1 tablet (10 mg total) by mouth 2 (two) times a day as needed for pain (max of 10/28 days)., Disp: 10 tablet, Rfl: 2     losartan (COZAAR) 50 MG tablet, [LOSARTAN (COZAAR) 50 MG TABLET] Take 100 mg by mouth daily., Disp: , Rfl:      multivitamin capsule, [MULTIVITAMIN CAPSULE] Take 1 capsule by mouth daily., Disp: , Rfl:      pantoprazole (PROTONIX) 20 MG tablet, [PANTOPRAZOLE (PROTONIX) 20 MG TABLET] Take 20 mg by mouth daily. , Disp: , Rfl:      polyethylene glycol (GLYCOLAX) 17 gram/dose powder, [POLYETHYLENE GLYCOL (GLYCOLAX) 17 GRAM/DOSE POWDER] Take 17 g by mouth daily as needed. , Disp: , Rfl:      tiZANidine (ZANAFLEX) 4 MG capsule, Take 3 capsules (12 mg) by mouth nightly as needed for muscle spasms 11/4-2/2/22, Disp: 270 capsule, Rfl: 0 - she has not picked up the medication sent on 11/4/21     UNABLE TO FIND, [UNABLE TO FIND] Medical Cannabis - chronic intractable Pain - Migraine  Oral solution: 2 mL by mouth once daily  Capsules: 1 capsule by mouth at bedtime  Vape; 1-4 puffs inhaled as needed, Disp: , Rfl:      verapamil (CALAN) 80 MG tablet, Take 1 tablet (80 mg) by mouth 3 times daily, Disp: 270 tablet, Rfl: 0 - responsibility assumed by her medical doctor     and chart:   11/30/2021 Reviewed to aid with decision regarding medication  management  HYDROcodone-acetaminophen (NORCO)  MG per tablet 40 tablet 0 11/2/2021  No   Sig - Route: Take 1 tablet by mouth every 4 hours as needed (chronic pain) Start 11/2/21 - Oral     Last script:  Date: 11/2/21   Medication: hydrocodone  Dose: 10/325mg  Dispensed: 40  Prescriber: jania    Scheduled medication from other professionals: no  Medication(s): -    Expected dispensing: ok      Medical Cannabis:  Qualifying Condition: Chronic Intractable Pain - migrain HA of note not qualified for but getting benefit for IBS  Qualifying Provider: [facility] as of 8/28/20  Dispensary: [email or web address]  Email: [email or web address]        Benefit   List  benefit(s) pain, sleep, less anxiety, IBS has been better    Negative effect (s)   List negative effect(s) little tired    Specific negative effect(s)  Physical side effects: stomach upset, fatigue, headache, blurred vision: fatigue  Mental/cognitive side effects: mental clouding, confusion, depression: no  Worsening of symptoms related to the condition being treated; cannabis is not working well over time: no  Common side effects include dizziness, dry mouth, lightheadedness, drowsiness, cough, bronchitis: dry mouth  Nausea, vomiting, alleviated with hot showers: no      Provider clinical observations regarding medical cannabis use in this patient:pt has done very well this this option. She is using less opioid medication. She has seen several improvements that went beyond the original intention of qualifying.     Rehabilitation:  Home exercise program:   _________  Objective:  _________  Vitals:    11/30/21 1058   PainSc: Moderate Pain (4)       Constitutional:  Pleasant and cooperative person who presents alone today.  Psychiatric: Mood and affect are appropriate for the situation, setting and topic of discussion.  Patient does not appear sedated.  Integumentary:  Observed skin WNL.   HEENT: EOM's grossly intact.    Chest: Breathing is non-labored.   Neurological:  Alert and  oriented in all spheres including: time, place, person and situation.    Diagnostics:   Lab:  Last UDT on   Reviewed note 1/26/21 Last opioid dose was Norco at 6am on 1/26/2021. Pt is also on the medical cannabis program     UDT:  Detected mariajuana metabolite (on medical cannabis program); Detected hydrocodone and metabolites (expected).        ______________  Assessment:  ___________  Josy Valdez is a 56 year old person H/O CVA, anxiety, IBS.    Video evaluation for  migraine HA     Due to CVA she is not a candidate for triptans. Failed Botox. She has completed craniosacral therapy. She is doing a HEP from PT.   Working with the medical cannabis program.      She has done exceptionally well with the current combination of medication and medical cannabis. She does not always fill the hydrocodone monthly.        Continue video visits     *Universal Precautions:    CSA/ UDT 01/2021   Pharmacy- as documented    Count- #4 remaining hydrocodone at appt 7/17/15   Psychological evaluation: DA 9/18/15  MME- 40  1/22/19 MME=10  03/21/2019 MME- 10   05/23/2019 MME- 10  8/29/20 MME up to 40 receiving 40pills/month  Pharmacogenetic testing- n/a  MTM: n/a.  Medical cannabis +    Management of opioid medication is inherently a moderate to high complex medial interaction based on the risk management required at each contact r/t risks and side effects.    ______  Plan:  _____  Thank you for participating in the video visit - Here is the Plan of Care / NextSteps:    Contact [phone] to reserve a time. Please follow up by: 2 months      I want you to be aware I have resigned my position with [employer]. I will be here until Jan 14, 2022. I am working with administration to enhance the transition of patients. I am not clear to whom patients will be transferred within the pain team at this time.     I understand for patients who have prescriptions through the pain center, those prescriptions will sustained and there should be no  issues with getting refills as it relates to my departure.     Communicating with us:  Please call Monday-Friday for problems or questions - leave a message and one of the clinical support staff (CSS) will help to get things figured out. The number is [phone].    You may also opt to communicate through [employer].    Rehabilitation: Remain active continue your home exercises and stretches.    Medical Cannabis: qualified and doing well    Medication prescribed / to be continued: Continue compazine, toradol    Signed Prescriptions:                        Disp   Refills    HYDROcodone-acetaminophen (NORCO)  M*40 tab*0        Sig: Take 1 tablet by mouth every 4 hours as needed           (chronic pain) Start 11/30/21  Authorizing Provider: LOLA TALAVERA        REFILL INSTRUCTIONS:    Please contact the clinic 5-7 working days (does not include Saturday or Sunday) before your refill is due or there may be delays in medication  - use either [employer] or the telephone system not both.     Call [phone] leave: If calling speak clearly; note cell phones cut in-and-out and poor quality speech and reception issues will influence our ability to hear you and be efficient with your prescription.  Your name (first and last w/ spelling)  Date of birth  Name of all the medication(s) being requested  Dose of the medication(s)  How you are taking the medication (eg. twice per day etc).    After 3 business days - please contact your pharmacy and talk with your pharmacist about any government Controlled/Scheduled medications.  Please request the pharmacist check your profile to be certain about any concerns with a script failing to be received.   If the script has not been received there may have been a problem with the communication please reach back out to the clinic.    Due to increased volume we will not be able to call you and make you aware of your scripts.        Lola Talavera APRN FNP-BC  [address] [place] [address]  [phone]  [phone]      DASHA Castillo CNP    Patient presents to the clinic today for a follow up with DASHA Castillo CNP  regarding Pain Management.     Josy is a 56 year old who is being evaluated via a billable video visit.      How would you like to obtain your AVS? Mail a copy  If the video visit is dropped, the invitation should be resent by: Text to cell phone: [phone]  Will anyone else be joining your video visit? No      Video Start Time:   Video-Visit Details    Type of service:  Video Visit      Distant Location (provider location):  [facility] [place]     Platform used for Video Visit: [employer]          Is Pt currently in [place]? Yes    NOTE:  If Pt is not in [place], Appointment needs to be canceled and rescheduled ( for Jessica Talavera for now)        PEG Score 11/30/2021   PEG Total Score 2.67              QUESTIONS:    JAN Geiger MA  [facility]       Again, thank you for allowing me to participate in the care of your patient.        Sincerely,        DASHA Castillo CNP

## 2021-11-30 NOTE — PROGRESS NOTES
Patient presents to the clinic today for a follow up with DASHA Castillo CNP  regarding Pain Management.     Josy is a 56 year old who is being evaluated via a billable video visit.      How would you like to obtain your AVS? Mail a copy  If the video visit is dropped, the invitation should be resent by: Text to cell phone: [phone]  Will anyone else be joining your video visit? No      Video Start Time:   Video-Visit Details    Type of service:  Video Visit      Distant Location (provider location):  [facility] [place]     Platform used for Video Visit: [employer]          Is Pt currently in [place]? Yes    NOTE:  If Pt is not in [place], Appointment needs to be canceled and rescheduled ( for Jessica Talavera for now)        PEG Score 11/30/2021   PEG Total Score 2.67              QUESTIONS:    JAN Geiger MA  [facility]

## 2021-11-30 NOTE — PATIENT INSTRUCTIONS
___  Plan:  _____  Thank you for participating in the video visit - Here is the Plan of Care / NextSteps:    Contact [phone] to reserve a time. Please follow up by: 2 months      I want you to be aware I have resigned my position with [employer]. I will be here until Jan 14, 2022. I am working with administration to enhance the transition of patients. I am not clear to whom patients will be transferred within the pain team at this time.     I understand for patients who have prescriptions through the pain center, those prescriptions will sustained and there should be no issues with getting refills as it relates to my departure.     Communicating with us:  Please call Monday-Friday for problems or questions - leave a message and one of the clinical support staff (CSS) will help to get things figured out. The number is [phone].    You may also opt to communicate through [employer].    Rehabilitation: Remain active continue your home exercises and stretches.    Medical Cannabis: qualified and doing well    Medication prescribed / to be continued: Continue compazine, toradol    Signed Prescriptions:                        Disp   Refills    HYDROcodone-acetaminophen (NORCO)  M*40 tab*0        Sig: Take 1 tablet by mouth every 4 hours as needed           (chronic pain) Start 11/30/21  Authorizing Provider: DANNI LYNCH        REFILL INSTRUCTIONS:    Please contact the clinic 5-7 working days (does not include Saturday or Sunday) before your refill is due or there may be delays in medication  - use either [employer] or the telephone system not both.     Call [phone] leave: If calling speak clearly; note cell phones cut in-and-out and poor quality speech and reception issues will influence our ability to hear you and be efficient with your prescription.  Your name (first and last w/ spelling)  Date of birth  Name of all the medication(s) being requested  Dose of the medication(s)  How you are taking  the medication (eg. twice per day etc).    After 3 business days - please contact your pharmacy and talk with your pharmacist about any government Controlled/Scheduled medications.  Please request the pharmacist check your profile to be certain about any concerns with a script failing to be received.   If the script has not been received there may have been a problem with the communication please reach back out to the clinic.    Due to increased volume we will not be able to call you and make you aware of your scripts.

## 2021-11-30 NOTE — PROGRESS NOTES
Medical Record review and prep:  Start Time: 0741  End Time: 0744   TT: 3      Video Start Time: 11:18 AM  Video-Visit Details    Type of service:  Video Visit    Video End Time:11:47 AM    Originating Location (pt. Location): Home    Distant Location (provider location):  [facility]     Platform used for Video Visit: Carisa Valdez is a 56 year old person last evaluated 9/14/21.  H/O IBS, anxiety. Is being evaluated for migraine.         Major issues:  1. Chronic migraine without aura with status migrainosus, not intractable    2. Intractable chronic migraine without aura and without status migrainosus      Patient Active Problem List   Diagnosis     Migraine     Myalgia     Plantar fasciitis, bilateral     Hip pain, chronic, left     Lumbago       _____  HPI:  _____    MyChart/Communications: she has not been able to get on mychart    Location/Laterality of the pain: right shoulder (improved) and  low back (comes and goes)  Quality: stiffness  Aggravating factors: chores above her head (reaching up to wash hair)  Alleviating factors: pacing, medication, heating pad, medical cannabis     Headache: she is having some trouble telling. Generally thinks things are about the same. Nausea with HA  Frequency of HA: daily, migraines have not been as bad or as frequent  Duration of HA: all day, constant, migraines will last 2days  Quality of HA: pulsing, throbbing    Location of HA: suboccipital w/ migraine then moves to the center of her head - neck pain,  Head - center of the head and back of the skull. Her neck will ache when she has a migraine  Severity of HA: 4/10  Provoked by: weather change  Aura description: peripheral twinkling; black dots with the migraine, dizziness (has not bother her for some time); she will get forgetful and she will yawn a lot before a HA  #of HA days per mo: daily baseline HA  Rehabilitation: She has completed Craniosacral therapy and we will monitor for any  changes over time. Hx of PT. Self massage  Interventional: TPI; Botox injection (did not offer impact and is not considered to be a option)  Prophylactic treatment: verapmil , medical cannabis  Abortive Medication for HA: not a candidate r/t the CVA, medical cannabis  Treated: Norco and Toradol (assistive), Dark room, sleep, medical cannabis, OTC topical  Associated Mainfestiation of the migraine: Photophobia, watery eyes  Not tried acupuncture afraid of needles, not interested in chiropractic     Any New pain, injuries, falls: acute pain symptoms from a growth  Since last visit pain has: stable generally    Associated symptoms:   Functional Symptoms: Pain interferes with:  Sleep: not bad a little better than usual recently  Walking:  Ambulation/Transfer: Pt is ambulatory. Transfers independently.     ADL's/IADL's: she is getting her chores done - she can over do but is able to pace her activities. She is not reporting anything her  needs to help her with     Social indicators for health:  Housing: secure  Communications: able to manage video visits  Transportation: managing transportation  Relationships/Social: They had a intimate Thanksgiving. They are planning for a face-time  holiday    Impact of pain treatments:  Patient reports function has improved with current pain treatment:yes      Pain Plan of Care Review:  Medication changes: no  Medication side/adverse effects: no  Considerations: no      Current Outpatient Medications:      aspirin 81 mg chewable tablet, [ASPIRIN 81 MG CHEWABLE TABLET] Chew 81 mg daily., Disp: , Rfl:      atorvastatin (LIPITOR) 20 MG tablet, [ATORVASTATIN (LIPITOR) 20 MG TABLET] Take 20 mg by mouth., Disp: , Rfl:      CHOLECALCIFEROL, VITAMIN D3, (VITAMIN D3 ORAL), [CHOLECALCIFEROL, VITAMIN D3, (VITAMIN D3 ORAL)] Take by mouth daily. , Disp: , Rfl:      fluoxetine HCl (PROZAC ORAL), [FLUOXETINE HCL (PROZAC ORAL)] Take 60 mg by mouth., Disp: , Rfl:      hydroCHLOROthiazide  (HYDRODIURIL) 12.5 MG tablet, [HYDROCHLOROTHIAZIDE (HYDRODIURIL) 12.5 MG TABLET] Take 12.5 mg by mouth daily., Disp: , Rfl:      HYDROcodone-acetaminophen (NORCO)  MG per tablet, Take 1 tablet by mouth every 4 hours as needed (chronic pain) Start 11/2/21, Disp: 40 tablet, Rfl: 0 - she could use a refill on the hydrocodone     ketorolac (TORADOL) 10 mg tablet, [KETOROLAC (TORADOL) 10 MG TABLET] Take 1 tablet (10 mg total) by mouth 2 (two) times a day as needed for pain (max of 10/28 days)., Disp: 10 tablet, Rfl: 2     losartan (COZAAR) 50 MG tablet, [LOSARTAN (COZAAR) 50 MG TABLET] Take 100 mg by mouth daily., Disp: , Rfl:      multivitamin capsule, [MULTIVITAMIN CAPSULE] Take 1 capsule by mouth daily., Disp: , Rfl:      pantoprazole (PROTONIX) 20 MG tablet, [PANTOPRAZOLE (PROTONIX) 20 MG TABLET] Take 20 mg by mouth daily. , Disp: , Rfl:      polyethylene glycol (GLYCOLAX) 17 gram/dose powder, [POLYETHYLENE GLYCOL (GLYCOLAX) 17 GRAM/DOSE POWDER] Take 17 g by mouth daily as needed. , Disp: , Rfl:      tiZANidine (ZANAFLEX) 4 MG capsule, Take 3 capsules (12 mg) by mouth nightly as needed for muscle spasms 11/4-2/2/22, Disp: 270 capsule, Rfl: 0 - she has not picked up the medication sent on 11/4/21     UNABLE TO FIND, [UNABLE TO FIND] Medical Cannabis - chronic intractable Pain - Migraine  Oral solution: 2 mL by mouth once daily  Capsules: 1 capsule by mouth at bedtime  Vape; 1-4 puffs inhaled as needed, Disp: , Rfl:      verapamil (CALAN) 80 MG tablet, Take 1 tablet (80 mg) by mouth 3 times daily, Disp: 270 tablet, Rfl: 0 - responsibility assumed by her medical doctor     and chart:   11/30/2021 Reviewed to aid with decision regarding medication management  HYDROcodone-acetaminophen (NORCO)  MG per tablet 40 tablet 0 11/2/2021  No   Sig - Route: Take 1 tablet by mouth every 4 hours as needed (chronic pain) Start 11/2/21 - Oral     Last script:  Date: 11/2/21   Medication: hydrocodone  Dose:  10/325mg  Dispensed: 40  Prescriber: jania    Scheduled medication from other professionals: no  Medication(s): -    Expected dispensing: ok      Medical Cannabis:  Qualifying Condition: Chronic Intractable Pain - migrain HA of note not qualified for but getting benefit for IBS  Qualifying Provider: Sadaf as of 8/28/20  Dispens[facility]: [facility]  Email: [email or web address]        Benefit   List  benefit(s) pain, sleep, less anxiety, IBS has been better    Negative effect (s)   List negative effect(s) little tired    Specific negative effect(s)  Physical side effects: stomach upset, fatigue, headache, blurred vision: fatigue  Mental/cognitive side effects: mental clouding, confusion, depression: no  Worsening of symptoms related to the condition being treated; cannabis is not working well over time: no  Common side effects include dizziness, dry mouth, lightheadedness, drowsiness, cough, bronchitis: dry mouth  Nausea, vomiting, alleviated with hot showers: no      Provider clinical observations regarding medical cannabis use in this patient:pt has done very well this this option. She is using less opioid medication. She has seen several improvements that went beyond the original intention of qualifying.     Rehabilitation:  Home exercise program:   _________  Objective:  _________  Vitals:    11/30/21 1058   PainSc: Moderate Pain (4)       Constitutional:  Pleasant and cooperative person who presents alone today.  Psychiatric: Mood and affect are appropriate for the situation, setting and topic of discussion.  Patient does not appear sedated.  Integumentary:  Observed skin WNL.   HEENT: EOM's grossly intact.    Chest: Breathing is non-labored.   Neurological:  Alert and oriented in all spheres including: time, place, person and situation.    Diagnostics:   Lab:  Last UDT on   Reviewed note 1/26/21 Last opioid dose was Norco at 6am on 1/26/2021. Pt is also on the medical cannabis program     UDT:  Detected mariajuana  metabolite (on medical cannabis program); Detected hydrocodone and metabolites (expected).        ______________  Assessment:  ___________  Josy Valdez is a 56 year old person H/O CVA, anxiety, IBS.    Video evaluation for  migraine HA     Due to CVA she is not a candidate for triptans. Failed Botox. She has completed craniosacral therapy. She is doing a HEP from PT.   Working with the medical cannabis program.      She has done exceptionally well with the current combination of medication and medical cannabis. She does not always fill the hydrocodone monthly.        Continue video visits     *Universal Precautions:    CSA/ UDT 01/2021   Pharmacy- as documented    Count- #4 remaining hydrocodone at appt 7/17/15   Psychological evaluation: DA 9/18/15  MME- 40  1/22/19 MME=10  03/21/2019 MME- 10   05/23/2019 MME- 10  8/29/20 MME up to 40 receiving 40pills/month  Pharmacogenetic testing- n/a  MTM: n/a.  Medical cannabis +    Management of opioid medication is inherently a moderate to high complex medial interaction based on the risk management required at each contact r/t risks and side effects.    ______  Plan:  _____  Thank you for participating in the video visit - Here is the Plan of Care / NextSteps:    Contact [phone] to reserve a time. Please follow up by: 2 months      I want you to be aware I have resigned my position with [facility]. I will be here until Jan 14, 2022. I am working with administration to enhance the transition of patients. I am not clear to whom patients will be transferred within the pain team at this time.     I understand for patients who have prescriptions through the pain center, those prescriptions will sustained and there should be no issues with getting refills as it relates to my departure.     Communicating with us:  Please call Monday-Friday for problems or questions - leave a message and one of the clinical support staff (CSS) will help to get things figured out. The number is  [phone].    You may also opt to communicate through [employer].    Rehabilitation: Remain active continue your home exercises and stretches.    Medical Cannabis: qualified and doing well    Medication prescribed / to be continued: Continue compazine, toradol    Signed Prescriptions:                        Disp   Refills    HYDROcodone-acetaminophen (NORCO)  M*40 tab*0        Sig: Take 1 tablet by mouth every 4 hours as needed           (chronic pain) Start 11/30/21  Authorizing Provider: LOLA LYNCH        REFILL INSTRUCTIONS:    Please contact the clinic 5-7 working days (does not include Saturday or Sunday) before your refill is due or there may be delays in medication  - use either [employer] or the telephone system not both.     Call [phone] leave: If calling speak clearly; note cell phones cut in-and-out and poor quality speech and reception issues will influence our ability to hear you and be efficient with your prescription.  Your name (first and last w/ spelling)  Date of birth  Name of all the medication(s) being requested  Dose of the medication(s)  How you are taking the medication (eg. twice per day etc).    After 3 business days - please contact your pharmacy and talk with your pharmacist about any government Controlled/Scheduled medications.  Please request the pharmacist check your profile to be certain about any concerns with a script failing to be received.   If the script has not been received there may have been a problem with the communication please reach back out to the clinic.    Due to increased volume we will not be able to call you and make you aware of your scripts.        Lola LOU [facility]  [address] [address]  [phone]  [phone]      DASHA Castillo CNP

## 2021-12-09 DIAGNOSIS — G43.719 INTRACTABLE CHRONIC MIGRAINE WITHOUT AURA AND WITHOUT STATUS MIGRAINOSUS: ICD-10-CM

## 2021-12-09 RX ORDER — VERAPAMIL HYDROCHLORIDE 80 MG/1
80 TABLET ORAL 3 TIMES DAILY
Qty: 270 TABLET | Refills: 0 | Status: SHIPPED | OUTPATIENT
Start: 2021-12-09

## 2021-12-09 NOTE — TELEPHONE ENCOUNTER
Received fax request from [facility] [id number] [facility] - [facility] [address] [place] pharmacy requesting refill(s) for verapamil (CALAN) 80 MG tablet    Last refilled on 09/20/2021    Pt last seen on 11/30/2021  Next appt scheduled for 01/24/2022    Will facilitate refill.    Stephanie Geiger MA  [facility] [place]

## 2021-12-31 DIAGNOSIS — G43.701 CHRONIC MIGRAINE WITHOUT AURA WITH STATUS MIGRAINOSUS, NOT INTRACTABLE: ICD-10-CM

## 2021-12-31 RX ORDER — HYDROCODONE BITARTRATE AND ACETAMINOPHEN 10; 325 MG/1; MG/1
1 TABLET ORAL EVERY 4 HOURS PRN
Qty: 40 TABLET | Refills: 0 | Status: SHIPPED | OUTPATIENT
Start: 2021-12-31 | End: 2022-01-24

## 2021-12-31 NOTE — TELEPHONE ENCOUNTER
Received call from patient requesting refill(s) of Norco     Last dispensed from pharmacy on not noted on outside med list    Patient's last office/virtual visit by prescribing provider on 11/30/21  Next office/virtual appointment scheduled for 1/24/22    Last urine drug screen date 1/2021  Current opioid agreement on file (completed within the last year) Yes Date of opioid agreement: 1/2021    E-prescribe to [facility] pharmacy  Pending Prescriptions:                       Disp   Refills    HYDROcodone-acetaminophen (NORCO)  *40 tab*0            Sig: Take 1 tablet by mouth every 4 hours as needed           (chronic pain) Start 12/31/21

## 2021-12-31 NOTE — TELEPHONE ENCOUNTER
Chart review of last script  HYDROcodone-acetaminophen (NORCO)  MG per tablet 40 tablet 0 11/30/2021  No   Sig - Route: Take 1 tablet by mouth every 4 hours as needed (chronic pain) Start 11/30/21 - Oral         :   12/31/2021 Reviewed to aid with decision regarding medication management  Last script:  Date: 11/30  Medication: hydrocodone  Dose: 10/325mg   Dispensed: 40   Prescriber: jania    Completed  Signed Prescriptions:                        Disp   Refills    HYDROcodone-acetaminophen (NORCO)  M*40 tab*0        Sig: Take 1 tablet by mouth every 4 hours as needed           (chronic pain) Start 12/31/21  Authorizing Provider: DANNI LYNCH
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
## 2022-01-24 ENCOUNTER — VIRTUAL VISIT (OUTPATIENT)
Dept: PALLIATIVE MEDICINE | Facility: OTHER | Age: 57
End: 2022-01-24
Payer: COMMERCIAL

## 2022-01-24 DIAGNOSIS — G43.701 CHRONIC MIGRAINE WITHOUT AURA WITH STATUS MIGRAINOSUS, NOT INTRACTABLE: ICD-10-CM

## 2022-01-24 PROCEDURE — 99215 OFFICE O/P EST HI 40 MIN: CPT | Mod: 95 | Performed by: NURSE PRACTITIONER

## 2022-01-24 RX ORDER — HYDROCODONE BITARTRATE AND ACETAMINOPHEN 10; 325 MG/1; MG/1
1 TABLET ORAL 2 TIMES DAILY PRN
Qty: 40 TABLET | Refills: 0 | Status: SHIPPED | OUTPATIENT
Start: 2022-01-24

## 2022-01-24 RX ORDER — PROCHLORPERAZINE MALEATE 10 MG
10 TABLET ORAL
COMMUNITY
Start: 2021-04-26

## 2022-01-24 RX ORDER — TIZANIDINE HYDROCHLORIDE 4 MG/1
12 CAPSULE, GELATIN COATED ORAL
Qty: 270 CAPSULE | Refills: 0 | Status: SHIPPED | OUTPATIENT
Start: 2022-01-24 | End: 2022-06-13
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
## 2022-01-24 NOTE — PROGRESS NOTES
Josy is a 56 year old who is being evaluated via a billable video visit.      How would you like to obtain your AVS? Mail a copy  If the video visit is dropped, the invitation should be resent by: Text to cell phone: [phone]  Will anyone else be joining your video visit? No      Video-Visit Details    Type of service:  Video Visit      Distant Location (provider location):  [facility]     Platform used for Video Visit: Other: Jessica always sent a link in messages. Patient is not aware of what the platform was.      Is Pt currently in [place]? Yes    NOTE:  If Pt is not in [place], Appointment needs to be canceled and rescheduled.      PEG Score 11/30/2021 1/24/2022   PEG Total Score 2.67 3.67       DALIA Dvual ([facility]).................... January 24, 2022  9:41 AM

## 2022-04-27 RX ORDER — TIZANIDINE HYDROCHLORIDE 4 MG/1
12 CAPSULE, GELATIN COATED ORAL
Qty: 270 CAPSULE | Refills: 0 | Status: CANCELLED | OUTPATIENT
Start: 2022-04-27

## 2022-04-27 NOTE — TELEPHONE ENCOUNTER
Received fax request from [facility] [id number] [place] [facility] - [facility] [address] [place] pharmacy requesting refill(s) for tiZANidine (ZANAFLEX) 4 MG capsule    Last refilled on 01/24/2022    Pt last seen on 01/24/2022  Next appt scheduled for NA    Will facilitate refill.    Stephanie Teixeira MA  [facility] [place]

## 2022-04-27 NOTE — TELEPHONE ENCOUNTER
Tizanidine refill declined.     Josy was discharged from the Pain Clinic at her January appt and told to request refills from her primary.     If still available, please send pharmacy fax back stating she is no longer a patient in [facility].     Please tell Josy to contact her current PCP for refills.     This is documented in the note and on the AVS from 1/24/22 appt.     DASHA Coe, NP-C  [facility]

## 2022-06-13 DIAGNOSIS — G43.701 CHRONIC MIGRAINE WITHOUT AURA WITH STATUS MIGRAINOSUS, NOT INTRACTABLE: Primary | ICD-10-CM

## 2022-06-13 RX ORDER — TIZANIDINE HYDROCHLORIDE 4 MG/1
12 CAPSULE, GELATIN COATED ORAL
Qty: 270 CAPSULE | Refills: 0 | Status: SHIPPED | OUTPATIENT
Start: 2022-06-13

## 2022-06-13 NOTE — TELEPHONE ENCOUNTER
Fax received from: [facility] [id number] [place], [place]  Refill authorization requested    Drug: tiZANidine (ZANAFLEX) 4 MG capsule  Qty: 270  Last filled 02/07/22  Last seen: 1/24/22  Next appointment None     Mp Canchola MA

## 2022-09-13 DIAGNOSIS — G43.701 CHRONIC MIGRAINE WITHOUT AURA WITH STATUS MIGRAINOSUS, NOT INTRACTABLE: ICD-10-CM

## 2022-09-13 RX ORDER — TIZANIDINE HYDROCHLORIDE 4 MG/1
12 CAPSULE, GELATIN COATED ORAL
Qty: 270 CAPSULE | Refills: 0 | Status: CANCELLED | OUTPATIENT
Start: 2022-09-13

## 2022-09-13 NOTE — TELEPHONE ENCOUNTER
Refill declined:     I haven't seen her in 9 months. She should request from PCP or provider she sees regularly. Does not need speciality care to prescribe muscle relaxer.     DASHA Coe, NP-C  [facility] [place]

## 2022-09-13 NOTE — TELEPHONE ENCOUNTER
Received fax request from [facility] [id number] [place] [facility] - [facility] [address]  pharmacy requesting refill(s) for tiZANidine (ZANAFLEX) 4 MG capsule    Last refilled on 06/13/2022    Pt last seen on 01/24/2022  Next appt scheduled for None    Will facilitate refill.    Stephanie Teixeira MA  [facility] [place]

## 2023-06-13 NOTE — PROGRESS NOTES
Subjective:   Josy Valdez is a 54 y.o. female who presents for evaluation of pain. Reviewed the rooming evaluation. Patient was last seen 3/22/19 reviewed record.     CC: Pain. Review of current status.     Major issues:  1. Intractable chronic migraine without aura and with status migrainosus    2. Chronic migraine without aura with status migrainosus, not intractable      Patient Active Problem List   Diagnosis     Migraine     Myalgia     Plantar fasciitis, bilateral     Hip pain, chronic, left     Lumbago       HPI: Questionnaires and records reviewed with the patient today.    Location/Laterality of the pain: neck pain, and right shoulder  Severity: Today: 4   Since last visit pain scores: at best 2. at worst 8. on average 4  Quality: pain in the back of the head moves toward the center  Timing: constant  Aggravating factors: lack of sleep, stress  Alleviating factors: medication, pacing activities  Any New pain, injuries, falls: tore a muscle in the abdomen in the rib line region  Since last visit pain has: improved  Associated symptoms:    Numbness: -   Weakness: -   Bladder or Bowel loss of control: -   Night pain: +   Fever and/or Chills: -   Unexplained weight loss: -      Headache: constant HA but it is not like it use to be. She is continuing to get the migraines but they appear managed at this time .  Frequency of HA: daily    Duration of HA: all day, constant   Quality of HA: pulsing, throbbing    Location of HA: suboccipital w/ migraine then moves to the center of her head    Severity of HA: 3/10  Aura description: peripheral twinkling; black dots with the migraine, dizziness (this has been better)  #of HA days per mo: daily baseline HA  Rehabilitation: She has completed Craniosacral therapy and we will monitor for any changes over time. Hx of PT. Self massage  Interventional: TPI; Botox injection (did not offer impact and is not considered to be a option)  Prophylactic treatment: verapmil , medical  cannabis  Abortive Medication for HA: not a candidate r/t the CVA    Treated: Norco and Toradol (assistive), Dark room, sleep, medical cannabis  Associated Mainfestiation of the migraine: Photophobia, watery eyes  Not tried acupuncture afraid of needles, not interested in chiropractic     Functional Symptoms: Pain interferes with:  Sleep: +  Walking   Ambulation/Transfer: Pt is ambulatory. Transfers independently.  ADL's: independent with pacing  Transportation: Driving  Relationships/Social: Patient is engaged with family and friends in a meaningful fashion.     Activities Impaired by Increasing Pain Severity: F= 6  3-Enjoy  4-Work, Enjoy  5-Active, Mood Work Enjoy  6-Sleep, Active, Mood Work Enjoy  7-Walk, Sleep, Active, Mood Work Enjoy  8-Relate, Walk, Sleep, Active, Mood Work Enjoy    Impact of pain treatments:   Patient reports function has improved with current pain treatment: yes    Mood related to pain:   Depressed: -   Angry: -   Frustrated: -   Anxious: +   Helpless/Hopeless: -    Pertinent Medical Hx/Safety:   Blood thinners: -   Pregnant or wanting to become pregnant: -   New diagnostics since last visit: -   ED/UC visit since last visit: -   New treatment or New medical condition: -    Pain Plan of Care Review:   Medication:   Last opioid dose was Hydrocodone last dose- a few days ago    Medication changes: no  Medication side/adverse effects: no  Aberrant behavior: no      Current Outpatient Medications:      aspirin 81 mg chewable tablet, Chew 81 mg daily., Disp: , Rfl:      azelastine (ASTEPRO) 0.15 % (205.5 mcg) Spry nasal spray, Apply 205.5 mcg into each nostril 2 (two) times a day., Disp: , Rfl:      CHOLECALCIFEROL, VITAMIN D3, (VITAMIN D3 ORAL), Take by mouth., Disp: , Rfl:      DOCOSAHEXANOIC ACID/EPA (FISH OIL ORAL), Take by mouth., Disp: , Rfl:      fluoxetine HCl (PROZAC ORAL), Take 60 mg by mouth., Disp: , Rfl: - continued and is helping with the anxiety     GARLIC ORAL, Take by mouth.,  Disp: , Rfl:      HYDROcodone-acetaminophen (NORCO )  mg per tablet, Take 1 tablet by mouth every 4 (four) hours as needed for pain (max of 4/day and 140/28days)., Disp: 112 tablet, Rfl: 0 - continued     ketorolac (TORADOL) 10 mg tablet, Take 1 tablet (10 mg total) by mouth 2 (two) times a day as needed for pain (max of 10/28 days)., Disp: 10 tablet, Rfl: 0 - has a full script at home     L.ACID/L.CASEI/B.BIF/B.ERIC/FOS (PROBIOTIC BLEND ORAL), Take by mouth., Disp: , Rfl:      losartan-hydrochlorothiazide (HYZAAR) 50-12.5 mg per tablet, Take 1 tablet by mouth., Disp: , Rfl:      melatonin 3 mg TbER, Take 6 mg by mouth., Disp: , Rfl:      multivitamin capsule, Take 1 capsule by mouth daily., Disp: , Rfl:      omeprazole (PRILOSEC) 20 MG capsule, TAKE 1 CAPSULE BY MOUTH EVERY DAY, Disp: 30 capsule, Rfl: 1 - continued she is seein primary care     polyethylene glycol (GLYCOLAX) 17 gram/dose powder, , Disp: , Rfl:      TiZANidine (ZANAFLEX) 4 MG capsule, Take 3 capsules (12 mg total) by mouth at bedtime as needed for muscle spasms., Disp: 270 capsule, Rfl: 0 -      UNABLE TO FIND, Medical Cannabis, Disp: , Rfl:  - continued     verapamil (CALAN) 80 MG tablet, Take 1 tablet (80 mg total) by mouth 2 (two) times a day., Disp: 180 tablet, Rfl: 0 - continued    Rehabilitation:   Home exercise program: ess following the injury to the muscle in the abdomen      Review of Systems   Musculoskeletal- + pain  Neuro- - cognitive changes  HEENT: + HA  GI: IBS - has been a little worse again.   Psych-  + mood concerns (stable),  - taking medication in a fashion other than prescribed    Social  No family history on file.  Social History     Tobacco Use   Smoking Status Former Smoker   Smokeless Tobacco Never Used     Social History     Substance and Sexual Activity   Alcohol Use No    Comment: in recovery     Social History     Substance and Sexual Activity   Drug Use No     Social History     Substance and Sexual Activity  "  Sexual Activity Not on file       Objective:     Vitals:    05/23/19 1122   BP: 135/82   Pulse: 82   Resp: 16   Weight: 210 lb (95.3 kg)   Height: 5' 5\" (1.651 m)   PainSc:   4       Constitutional:  Pleasant and cooperative female who presents alone today.   Psychiatric: Mood and affect are appropriate for the situation, setting and topic of discussion.  Patient does not appear sedated.  Integumentary:  Observed skin WNL.   HEENT: EOM's grossly intact.    Chest: Breathing is non-labored.   Neurological:  Alert and oriented in all spheres including: time, place, person and situation.    Diagnostics:   Lab:  Notes recorded by Lola Talavera CNP on 1/25/2019 at 4:31 PM CST  Reviewed note 1/22/19 Last opioid dose was Hydrocodone ast dose- 01/22/2019 @ 700am. Noted pt is on the medical cannabis program  UDT:  Detected Marijuana metabolite (expected); Detected hydrocodone and metabolite (expected)      :  Dated 5/23/2019 reviewed to aid with decision regarding medication management    Assessment: Dated 3/22/2019 ANJELICA Score: 16    Assessment:   Josy Valdez is a 54 y.o. female seen in clinic today for migraine HA. Due to CVA she is not a candidate for triptans. Failed Botox. She has completed craniosacral therapy. She is doing a HEP from PT.   She started the medical cannabis program this has been going well.      We are working down and off the opioid medication. She is doing well with the medical cannabis. We have discussed she may need opioids intermittently and she may need them when she travels as the medical cannabis is not able to be transported across state lines.       *Universal Precautions:    UDT/Swab- 01/22/2019  Consent-11/23/18  Agreement- 11/23/8  Pharmacy- as documented    Count- #4 remaining hydrocodone at appt 7/17/15   Psychological evaluation: DA 9/18/15  MME- 40  1/22/19 MME=10  03/21/2019 MME- 10   05/23/2019 MME- 10  Pharmacogenetic testing- n/a  MTM: n/a.    Management of opioid " medication is inherently a moderate to high complex medial interaction based on the risk management required at each contact r/t risks and side effects.    Plan:   Plan of Care / NextSteps:     Follow up by: 7/18/19      Education:   Please call Monday-Friday for problems or questions and one of the clinical support staff (CSS) will help to get things figured out. The number is [phone].     Yadio is a means to send an e-mail ([employer] message) to communicate any concerns.     Please remember some issues require an office visit.     Today we reviewed the plan of care and answered questions.      Records: Reviewed to assist with preparation for the office visit and are reflected throughout the note.    Primary Care: You need to have an annual physical and check in with your primary care folks on a regular basis. Talk with your primary care about the frequency of expected visits.     Rehabilitation: remain active       Medication prescribed / to be continued: norco, toradol (call if you need a refill), tizanidine (call if you need a refill - you may have a script at your pharmacy), verapamil (call if you need a refill you may have a script at your pharmacy), medial cannabis ([facility])  Medication prescribed today:    Requested Prescriptions     Signed Prescriptions Disp Refills     HYDROcodone-acetaminophen (NORCO )  mg per tablet 5/23-6/20 112 tablet 0     Sig: Take 1 tablet by mouth every 4 (four) hours as needed for pain (max of 4/day and 140/28days).     HYDROcodone-acetaminophen (NORCO )  mg per tablet 6/20-7/18 112 tablet 0     Sig: Take 1 tablet by mouth every 4 (four) hours as needed for pain (max of 4/day and 140/28days).         REFILL INSTRUCTIONS: Please be mindful to chose a single means of communication about medication refills as multiple means of communication for the same prescription request  (your pharmacy, mychart and telephone calls) leads to confusion and  delays    Note: Due to the increase in paperwork we are no longer leaving voice mail messages when refills have been sent to the pharmacy    Please contact the clinic 3-7 days before your refill is due. Speak clearly; note cell phones cut in-and-out and poor quality speech and reception issues will influence our ability to hear you and be efficient with your prescription.     Call [phone] leave:   Your name (first and last w/ spelling)   Date of birth  Name of all the medication(s) being requested  Dose of the medication(s)   How you are taking the medication (eg. twice per day etc).     Contact your pharmacy and talk with your pharmacist about any government Controlled/Scheduled medications 3 (three) days after leaving your message to see if your prescription has been received. Please request the pharmacist check your profile to be certain about any concerns with a script failing to be received.   If the script has not been received there may have been a problem with the communication please reach back out to the clinic.      SAFETY REMINDER  We need to be able to reach you. Please be certain we have a working telephone number. If we are unable to reach you we may not be able to continue to prescribe opioid medication.        FAQ  Q. Why is alcohol consumption a concern with pain medication?  A. Opioids decrease you drive to breathe. Alcohol enhances this effect.  Using together or within a week of each other is unsafe. If we can see it in the urine it is having an effect on your body.     Q. I live with chronic pain and take my medication like it is prescribed why am I at risk for an overdose?  A. Opioids decrease your drive to breathe. Generally a little decrease in drive to breathe is manageable. Illness like bladder infection, pneumonia, COPD, sleep apnea may decrease your ability to get oxygen. This can lead to an overdose.    If you are running a fever medication may be absorbed differently.     Nausea  "and vomiting have led to folks \"losing\" medication - additional dosing because we do not know how much may have been absorbed can lead to an overdose.     As we age general health changes occure and this can lead to increased issues with clearing medication from the liver or kidneys increasing the risk of an overdose.    Q. I have been using benzodiazepines for years with my opioids what happened that they should not be used together any more?  A. Combinations of medication can put a person at high risk for overdose. In one study it was noted that 80% of overdoses occurred in people who were taking a combination of opioids and benzodiazepines.    Q. I hear about Naloxone and I understand is a medicine that can be used if there is a concern about an overdose, should I have it available at home?  A. Anyone with an MME over 50 or who uses combinations of medication that enhance the effects of an opioid is a good candidate for Naloxone. If you do not have Naloxone ask me we can talk about it together.    Q. Why should I have a safe?  A. You assume the responsibility of harm or death another person should someone else use your medication. A big concern would be if someone else got your medication. Your medication is not prescribed to anyone other than you. Do not sell, loan, borrow or share your medication with anyone. It is illegal.     Q. What does an overdose look like?  A. Symptoms of overdose include:   !breathing slow and shallow, erratic or not at all  !pinpoint pupils, hallucinations  !confusion  !muscle jerks, slack muscles   !extreme sleepiness or loss of alertness   !awake but not able to talk   !face pale or clammy, vomiting, for lighter skinned people, the skin tone turns bluish purple, for darker skinned people, it turns grayish or ashen   If in a situation where overdose is a concern engage the emergency response system (dial 911).        TT: [phone]      Lola Talavera APRN FNP-BC  [address] [facility] [address]   [phone]  [phone]     Topical Sulfur Applications Pregnancy And Lactation Text: This medication is Pregnancy Category C and has an unknown safety profile during pregnancy. It is unknown if this topical medication is excreted in breast milk.

## 2023-11-28 ENCOUNTER — APPOINTMENT (OUTPATIENT)
Dept: MRI IMAGING | Facility: CLINIC | Age: 58
End: 2023-11-28
Attending: STUDENT IN AN ORGANIZED HEALTH CARE EDUCATION/TRAINING PROGRAM
Payer: COMMERCIAL

## 2023-11-28 ENCOUNTER — HOSPITAL ENCOUNTER (EMERGENCY)
Facility: CLINIC | Age: 58
Discharge: HOME | End: 2023-11-28
Attending: STUDENT IN AN ORGANIZED HEALTH CARE EDUCATION/TRAINING PROGRAM | Admitting: STUDENT IN AN ORGANIZED HEALTH CARE EDUCATION/TRAINING PROGRAM
Payer: COMMERCIAL

## 2023-11-28 VITALS
HEIGHT: 65 IN | BODY MASS INDEX: 34.99 KG/M2 | RESPIRATION RATE: 20 BRPM | DIASTOLIC BLOOD PRESSURE: 76 MMHG | HEART RATE: 95 BPM | SYSTOLIC BLOOD PRESSURE: 148 MMHG | OXYGEN SATURATION: 94 % | WEIGHT: 210 LBS | TEMPERATURE: 98.4 F

## 2023-11-28 DIAGNOSIS — R42 VERTIGO: ICD-10-CM

## 2023-11-28 PROBLEM — K58.9 IBS (IRRITABLE BOWEL SYNDROME): Status: ACTIVE | Noted: 2023-11-28

## 2023-11-28 PROBLEM — H93.13 TINNITUS OF BOTH EARS: Status: ACTIVE | Noted: 2021-04-26

## 2023-11-28 PROBLEM — H90.A31 MIXED CONDUCTIVE AND SENSORINEURAL HEARING LOSS OF RIGHT EAR WITH RESTRICTED HEARING OF LEFT EAR: Status: ACTIVE | Noted: 2021-04-26

## 2023-11-28 PROBLEM — G43.909 MIGRAINE HEADACHE: Status: ACTIVE | Noted: 2023-11-28

## 2023-11-28 LAB
ALBUMIN SERPL BCG-MCNC: 4.7 G/DL (ref 3.5–5.2)
ALP SERPL-CCNC: 78 U/L (ref 40–150)
ALT SERPL W P-5'-P-CCNC: 11 U/L (ref 0–50)
ANION GAP SERPL CALCULATED.3IONS-SCNC: 18 MMOL/L (ref 7–15)
AST SERPL W P-5'-P-CCNC: 20 U/L (ref 0–45)
ATRIAL RATE - MUSE: 111 BPM
BASOPHILS # BLD AUTO: 0.1 10E3/UL (ref 0–0.2)
BASOPHILS NFR BLD AUTO: 1 %
BILIRUB SERPL-MCNC: 0.3 MG/DL
BUN SERPL-MCNC: 13.4 MG/DL (ref 8–23)
CALCIUM SERPL-MCNC: 9.7 MG/DL (ref 8.6–10)
CHLORIDE SERPL-SCNC: 92 MMOL/L (ref 98–107)
CREAT SERPL-MCNC: 0.57 MG/DL (ref 0.51–0.95)
DEPRECATED HCO3 PLAS-SCNC: 21 MMOL/L (ref 22–29)
DIASTOLIC BLOOD PRESSURE - MUSE: NORMAL MMHG
EGFRCR SERPLBLD CKD-EPI 2021: >90 ML/MIN/1.73M2
EOSINOPHIL # BLD AUTO: 0.1 10E3/UL (ref 0–0.7)
EOSINOPHIL NFR BLD AUTO: 1 %
ERYTHROCYTE [DISTWIDTH] IN BLOOD BY AUTOMATED COUNT: 13.2 % (ref 10–15)
GLUCOSE SERPL-MCNC: 177 MG/DL (ref 70–99)
HCT VFR BLD AUTO: 38.6 % (ref 35–47)
HGB BLD-MCNC: 13.1 G/DL (ref 11.7–15.7)
IMM GRANULOCYTES # BLD: 0.1 10E3/UL
IMM GRANULOCYTES NFR BLD: 0 %
INTERPRETATION ECG - MUSE: NORMAL
LYMPHOCYTES # BLD AUTO: 1.8 10E3/UL (ref 0.8–5.3)
LYMPHOCYTES NFR BLD AUTO: 13 %
MCH RBC QN AUTO: 29 PG (ref 26.5–33)
MCHC RBC AUTO-ENTMCNC: 33.9 G/DL (ref 31.5–36.5)
MCV RBC AUTO: 85 FL (ref 78–100)
MONOCYTES # BLD AUTO: 0.8 10E3/UL (ref 0–1.3)
MONOCYTES NFR BLD AUTO: 6 %
NEUTROPHILS # BLD AUTO: 11 10E3/UL (ref 1.6–8.3)
NEUTROPHILS NFR BLD AUTO: 79 %
NRBC # BLD AUTO: 0 10E3/UL
NRBC BLD AUTO-RTO: 0 /100
P AXIS - MUSE: 56 DEGREES
PLATELET # BLD AUTO: 374 10E3/UL (ref 150–450)
POTASSIUM SERPL-SCNC: 3.4 MMOL/L (ref 3.4–5.3)
PR INTERVAL - MUSE: 166 MS
PROT SERPL-MCNC: 7.8 G/DL (ref 6.4–8.3)
QRS DURATION - MUSE: 88 MS
QT - MUSE: 358 MS
QTC - MUSE: 486 MS
R AXIS - MUSE: -2 DEGREES
RBC # BLD AUTO: 4.52 10E6/UL (ref 3.8–5.2)
SODIUM SERPL-SCNC: 131 MMOL/L (ref 135–145)
SYSTOLIC BLOOD PRESSURE - MUSE: NORMAL MMHG
T AXIS - MUSE: 51 DEGREES
TROPONIN T SERPL HS-MCNC: <6 NG/L
VENTRICULAR RATE- MUSE: 111 BPM
WBC # BLD AUTO: 13.8 10E3/UL (ref 4–11)

## 2023-11-28 PROCEDURE — 258N000003 HC RX IP 258 OP 636: Performed by: STUDENT IN AN ORGANIZED HEALTH CARE EDUCATION/TRAINING PROGRAM

## 2023-11-28 PROCEDURE — 36415 COLL VENOUS BLD VENIPUNCTURE: CPT | Performed by: STUDENT IN AN ORGANIZED HEALTH CARE EDUCATION/TRAINING PROGRAM

## 2023-11-28 PROCEDURE — 85025 COMPLETE CBC W/AUTO DIFF WBC: CPT | Performed by: STUDENT IN AN ORGANIZED HEALTH CARE EDUCATION/TRAINING PROGRAM

## 2023-11-28 PROCEDURE — 250N000013 HC RX MED GY IP 250 OP 250 PS 637: Performed by: STUDENT IN AN ORGANIZED HEALTH CARE EDUCATION/TRAINING PROGRAM

## 2023-11-28 PROCEDURE — 80053 COMPREHEN METABOLIC PANEL: CPT | Performed by: STUDENT IN AN ORGANIZED HEALTH CARE EDUCATION/TRAINING PROGRAM

## 2023-11-28 PROCEDURE — 70553 MRI BRAIN STEM W/O & W/DYE: CPT

## 2023-11-28 PROCEDURE — 96374 THER/PROPH/DIAG INJ IV PUSH: CPT

## 2023-11-28 PROCEDURE — 96361 HYDRATE IV INFUSION ADD-ON: CPT

## 2023-11-28 PROCEDURE — 84484 ASSAY OF TROPONIN QUANT: CPT | Performed by: STUDENT IN AN ORGANIZED HEALTH CARE EDUCATION/TRAINING PROGRAM

## 2023-11-28 PROCEDURE — 70544 MR ANGIOGRAPHY HEAD W/O DYE: CPT | Mod: XU

## 2023-11-28 PROCEDURE — 255N000002 HC RX 255 OP 636: Mod: JZ | Performed by: STUDENT IN AN ORGANIZED HEALTH CARE EDUCATION/TRAINING PROGRAM

## 2023-11-28 PROCEDURE — 93005 ELECTROCARDIOGRAM TRACING: CPT | Performed by: STUDENT IN AN ORGANIZED HEALTH CARE EDUCATION/TRAINING PROGRAM

## 2023-11-28 PROCEDURE — A9585 GADOBUTROL INJECTION: HCPCS | Mod: JZ | Performed by: STUDENT IN AN ORGANIZED HEALTH CARE EDUCATION/TRAINING PROGRAM

## 2023-11-28 PROCEDURE — 99285 EMERGENCY DEPT VISIT HI MDM: CPT | Mod: 25

## 2023-11-28 PROCEDURE — 250N000011 HC RX IP 250 OP 636: Mod: JZ | Performed by: STUDENT IN AN ORGANIZED HEALTH CARE EDUCATION/TRAINING PROGRAM

## 2023-11-28 PROCEDURE — 96375 TX/PRO/DX INJ NEW DRUG ADDON: CPT | Mod: 59

## 2023-11-28 PROCEDURE — 70549 MR ANGIOGRAPH NECK W/O&W/DYE: CPT

## 2023-11-28 RX ORDER — ONDANSETRON 4 MG/1
4 TABLET, ORALLY DISINTEGRATING ORAL EVERY 8 HOURS PRN
Qty: 10 TABLET | Refills: 0 | Status: SHIPPED | OUTPATIENT
Start: 2023-11-28 | End: 2023-12-01

## 2023-11-28 RX ORDER — DIPHENHYDRAMINE HYDROCHLORIDE 50 MG/ML
25 INJECTION INTRAMUSCULAR; INTRAVENOUS ONCE
Status: COMPLETED | OUTPATIENT
Start: 2023-11-28 | End: 2023-11-28

## 2023-11-28 RX ORDER — ONDANSETRON 2 MG/ML
4 INJECTION INTRAMUSCULAR; INTRAVENOUS ONCE
Status: COMPLETED | OUTPATIENT
Start: 2023-11-28 | End: 2023-11-28

## 2023-11-28 RX ORDER — METOCLOPRAMIDE HYDROCHLORIDE 5 MG/ML
10 INJECTION INTRAMUSCULAR; INTRAVENOUS ONCE
Status: COMPLETED | OUTPATIENT
Start: 2023-11-28 | End: 2023-11-28

## 2023-11-28 RX ORDER — MECLIZINE HYDROCHLORIDE 25 MG/1
25 TABLET ORAL 3 TIMES DAILY PRN
Qty: 15 TABLET | Refills: 0 | Status: SHIPPED | OUTPATIENT
Start: 2023-11-28

## 2023-11-28 RX ORDER — MECLIZINE HYDROCHLORIDE 25 MG/1
25 TABLET ORAL ONCE
Status: COMPLETED | OUTPATIENT
Start: 2023-11-28 | End: 2023-11-28

## 2023-11-28 RX ORDER — GADOBUTROL 604.72 MG/ML
10 INJECTION INTRAVENOUS ONCE
Status: COMPLETED | OUTPATIENT
Start: 2023-11-28 | End: 2023-11-28

## 2023-11-28 RX ADMIN — MECLIZINE HYDROCHLORIDE 25 MG: 25 TABLET ORAL at 18:47

## 2023-11-28 RX ADMIN — DIPHENHYDRAMINE HYDROCHLORIDE 25 MG: 50 INJECTION, SOLUTION INTRAMUSCULAR; INTRAVENOUS at 22:13

## 2023-11-28 RX ADMIN — ONDANSETRON 4 MG: 2 INJECTION INTRAMUSCULAR; INTRAVENOUS at 18:46

## 2023-11-28 RX ADMIN — METOCLOPRAMIDE HYDROCHLORIDE 10 MG: 5 INJECTION INTRAMUSCULAR; INTRAVENOUS at 22:13

## 2023-11-28 RX ADMIN — SODIUM CHLORIDE, POTASSIUM CHLORIDE, SODIUM LACTATE AND CALCIUM CHLORIDE 1000 ML: 600; 310; 30; 20 INJECTION, SOLUTION INTRAVENOUS at 20:47

## 2023-11-28 RX ADMIN — GADOBUTROL 10 ML: 604.72 INJECTION INTRAVENOUS at 20:46

## 2023-11-28 ASSESSMENT — ACTIVITIES OF DAILY LIVING (ADL)
ADLS_ACUITY_SCORE: 35
ADLS_ACUITY_SCORE: 35

## 2023-11-28 NOTE — ED TRIAGE NOTES
Triage Assessment (Adult)       Row Name 11/28/23 4539          Triage Assessment    Airway WDL WDL        Respiratory WDL    Respiratory WDL WDL        Skin Circulation/Temperature WDL    Skin Circulation/Temperature WDL WDL        Cardiac WDL    Cardiac WDL X  states feels anxious     Cardiac Rhythm ST

## 2023-11-28 NOTE — ED TRIAGE NOTES
"States she went to bed last night and was fine. Awoke at 0230 and felt dizzy but went back to bed.  Again woke up at 0600 and was still dizzy. Dizziness continued throughout the day and approx 1500 began feeling tired and BP was elevated. Admits to feeling anxious and states she feels like she may have a \"panic attack\".  BP continued to elevate and was as high as 179/96.  Came to ED for further evaluation. Continues to have dizziness and feel anxious     Triage Assessment (Adult)       Row Name 11/28/23 8519          Triage Assessment    Airway WDL WDL        Respiratory WDL    Respiratory WDL WDL        Skin Circulation/Temperature WDL    Skin Circulation/Temperature WDL WDL        Cardiac WDL    Cardiac WDL X  states feels anxious                     "

## 2023-11-29 NOTE — ED PROVIDER NOTES
"  Emergency Department Encounter         FINAL IMPRESSION:  BPPV          ED COURSE AND MEDICAL DECISION MAKING       ED Course as of 11/28/23 1834   Tue Nov 28, 2023 1817 EKG showing sinus tachycardia 111, no signs acute ischemia, no inversions no depressions no STEMI QTc today is 486, unchanged from previous EKG which was April 2011.   1832 Patient is a 58-year-old obese hypertensive diabetic female history of TIA here from home with vertigo type symptoms since 230 earlier this morning.  States symptoms began suddenly when she got up to let the dog out.  Increased nausea as well as a fogginess.  Increased dizziness and vertiginous type symptoms that she describes as imbalance.  No significant headache.  No vision changes.  No dysarthria.  No chest pain, trouble breathing, presyncope, abdominal pain or bowel movement changes.  On arrival here her NIH is 0.  Heart and lungs normal.  Patient states she still feels \"foggy.\"  Plan for cardiopulmonary, metabolic as well as imaging of patient's head and neck with MRI.     6:17 PM I met with the patient, obtained history, performed an initial exam, and discussed options and plan for diagnostics and treatment here in the ED.  10:03 PM I rechecked on the patient and updated them.    -MRI negative.  Patient feeling mildly improved.  Will discharge home with meclizine, PT referral and Zofran.                     Medical Decision Making    History:  Supplemental history from: Family Member/Significant Other  External Record(s) reviewed: Documented in chart, if applicable.    Work Up:  Chart documentation includes differential considered and any EKGs or imaging independently interpreted by provider, where specified.  In additional to work up documented, I considered the following work up: Documented in chart, if applicable.    External consultation:  Discussion of management with another provider: Documented in chart, if applicable    Complicating factors:  Care impacted by " chronic illness: Other: GERD, IBS, prediabetes, TIA  Care affected by social determinants of health: N/A    Disposition considerations: Discharge. I prescribed additional prescription strength medication(s) as charted. See documentation for any additional details.                  Critical Care     Performed by: Anish Gómez or    Authorized by: Anish Gómez  Total critical care time:  minutes  Critical care was necessary to treat or prevent imminent or life-threatening deterioration of the following conditions:   Critical care was time spent personally by me on the following activities: development of treatment plan with patient or surrogate, discussions with consultants, examination of patient, evaluation of patient's response to treatment, obtaining history from patient or surrogate, ordering and performing treatments and interventions, ordering and review of laboratory studies, ordering and review of radiographic studies, re-evaluation of patient's condition and monitoring for potential decompensation.  Critical care time was exclusive of separately billable procedures and treating other patients.'    At the conclusion of the encounter I discussed the results of all the tests and the disposition. The questions were answered. The patient or family acknowledged understanding and was agreeable with the care plan.        MEDICATIONS GIVEN IN THE EMERGENCY DEPARTMENT:  Medications - No data to display    NEW PRESCRIPTIONS STARTED AT TODAY'S ED VISIT:  New Prescriptions    No medications on file       HPI     Patient information obtained from: Patient, patient's     Use of : N/A     Josy Valdez is a 58 year old female with a pertinent history of GERD, IBS, TIA who presents to this ED by walk in for evaluation of dizziness.     The patient reports feeling dizzy, nauseated, foggy, and wobbly, but denies room spinning dizziness. These symptoms started when she woke up at 2:30 PM and got up out of bed to walk  "her dog. She reports feeling like \"I never woke up.\" She says that her symptoms feel like vertigo, but she denies a history of it. The patient reports a cough, but it is ongoing from a previous sickness. Of note, the patient has a history of room spinning migraines. She reports having a mastoidectomy in the past.    The patient denies chest pain, vision changes, dysarthria, trouble breathing, confusion, speech difficulty, abdominal problems, nasal congestion or any other complaints at this time.         MEDICAL HISTORY     Past Medical History:   Diagnosis Date    Anxiety     Depression     Migraine        No past surgical history on file.    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    Drug use: No       aspirin 81 mg chewable tablet  atorvastatin (LIPITOR) 20 MG tablet  CHOLECALCIFEROL, VITAMIN D3, (VITAMIN D3 ORAL)  FLUoxetine (PROZAC) 20 MG capsule  fluoxetine HCl (PROZAC ORAL)  hydroCHLOROthiazide (HYDRODIURIL) 12.5 MG tablet  HYDROcodone-acetaminophen (NORCO)  MG per tablet  losartan (COZAAR) 50 MG tablet  multivitamin capsule  pantoprazole (PROTONIX) 20 MG tablet  polyethylene glycol (GLYCOLAX) 17 gram/dose powder  prochlorperazine (COMPAZINE) 10 MG tablet  tiZANidine (ZANAFLEX) 4 MG capsule  UNABLE TO FIND  verapamil (CALAN) 80 MG tablet            PHYSICAL EXAM     BP (!) 149/72   Pulse 120   Temp 98.4  F (36.9  C) (Temporal)   Resp 20   Ht 1.651 m (5' 5\")   Wt 95.3 kg (210 lb)   SpO2 98%   BMI 34.95 kg/m        PHYSICAL EXAM:     General: Patient appears well, nontoxic, comfortable  HEENT: Moist mucous membranes,  No head trauma.  No nystagmus.  TMs clear bilaterally.  Cardiovascular: Normal rate, normal rhythm, no extremity edema.  No appreciable murmur.  Respiratory: No signs of respiratory distress, lungs are clear to auscultation bilaterally with no wheezes " rhonchi or rales.  Abdominal: Soft, nontender, nondistended, no palpable masses, no guarding, no rebound  Musculoskeletal: Full range of motion of joints, no deformities appreciated.  Neurological: Alert and oriented, grossly neurologically intact.  Alert and oriented, +5 strength UE/LE, normal finger to nose, , gross sensation intact throughout, CN II-12 intact grossly, subtle imbalance with ambulation, no slurring of words, no word finding difficulty    Psychological: Normal affect and mood.  Integument: No rashes appreciated          RESULTS       Labs Ordered and Resulted from Time of ED Arrival to Time of ED Departure - No data to display    No orders to display         PROCEDURES:  Procedures:  Procedures       IPedro am serving as a scribe to document services personally performed by Anish óGmez DO, based on my observations and the provider's statements to me.  I, Anish Gómez DO, attest that Pedro Lopez is acting in a scribe capacity, has observed my performance of the services and has documented them in accordance with my direction.    Anish Gómez DO  Emergency Medicine  [facility] EMERGENCY ROOM       Anish Gómez DO  11/28/23 7268

## 2023-11-29 NOTE — ED NOTES
Pt back from imaging. Per MRI tech, pt's R AC PIV did not work. Removed it as it has already dislodged. Pt still reports dizziness, denies nausea at this time.

## 2023-11-29 NOTE — ED NOTES
AVS discussed with pt. Prescriptions provided. Education given on new medications, worsening symptoms to watch out for, and close follow-up with PT and PCP. All questions were answered. Vitally stable upon discharge.    Spencer Gtz RN on 11/28/2023 at 10:48 PM

## 2023-11-30 ENCOUNTER — HOSPITAL ENCOUNTER (EMERGENCY)
Facility: CLINIC | Age: 58
End: 2023-11-30
Payer: COMMERCIAL